# Patient Record
Sex: FEMALE | Race: WHITE | NOT HISPANIC OR LATINO | Employment: UNEMPLOYED | URBAN - METROPOLITAN AREA
[De-identification: names, ages, dates, MRNs, and addresses within clinical notes are randomized per-mention and may not be internally consistent; named-entity substitution may affect disease eponyms.]

---

## 2023-09-25 ENCOUNTER — HOSPITAL ENCOUNTER (INPATIENT)
Facility: HOSPITAL | Age: 68
LOS: 16 days | Discharge: NURSING, REHAB OR LONG-TERM CARE | DRG: 058 | End: 2023-10-11
Attending: PSYCHIATRY & NEUROLOGY | Admitting: PSYCHIATRY & NEUROLOGY
Payer: MEDICARE

## 2023-09-25 DIAGNOSIS — G82.20: ICD-10-CM

## 2023-09-25 DIAGNOSIS — G95.20 CORD COMPRESSION: ICD-10-CM

## 2023-09-25 DIAGNOSIS — G95.19 EDEMA OF SPINAL CORD: ICD-10-CM

## 2023-09-25 DIAGNOSIS — G37.3 ACUTE TRANSVERSE MYELITIS: Primary | ICD-10-CM

## 2023-09-25 PROBLEM — K52.9 COLITIS: Status: ACTIVE | Noted: 2023-09-25

## 2023-09-25 PROBLEM — F32.A DEPRESSION: Status: ACTIVE | Noted: 2023-09-25

## 2023-09-25 PROBLEM — K21.9 GERD (GASTROESOPHAGEAL REFLUX DISEASE): Status: ACTIVE | Noted: 2023-09-25

## 2023-09-25 PROBLEM — I21.4 NSTEMI (NON-ST ELEVATED MYOCARDIAL INFARCTION): Status: ACTIVE | Noted: 2023-09-25

## 2023-09-25 PROBLEM — S24.0XXA: Status: ACTIVE | Noted: 2023-09-25

## 2023-09-25 PROBLEM — K64.9 HEMORRHOIDS: Status: ACTIVE | Noted: 2023-09-25

## 2023-09-25 LAB
ABO + RH BLD: NORMAL
ALBUMIN SERPL BCP-MCNC: 2.3 G/DL (ref 3.5–5.2)
ALP SERPL-CCNC: 212 U/L (ref 55–135)
ALT SERPL W/O P-5'-P-CCNC: 94 U/L (ref 10–44)
ANION GAP SERPL CALC-SCNC: 6 MMOL/L (ref 8–16)
APTT PPP: 22.7 SEC (ref 21–32)
ASCENDING AORTA: 2.6 CM
AST SERPL-CCNC: 123 U/L (ref 10–40)
AV INDEX (PROSTH): 0.92
AV MEAN GRADIENT: 4 MMHG
AV PEAK GRADIENT: 9 MMHG
AV VALVE AREA BY VELOCITY RATIO: 1.92 CM²
AV VALVE AREA: 2.31 CM²
AV VELOCITY RATIO: 0.76
BACTERIA #/AREA URNS AUTO: ABNORMAL /HPF
BILIRUB SERPL-MCNC: 0.6 MG/DL (ref 0.1–1)
BILIRUB UR QL STRIP: NEGATIVE
BLD GP AB SCN CELLS X3 SERPL QL: NORMAL
BNP SERPL-MCNC: 12 PG/ML (ref 0–99)
BSA FOR ECHO PROCEDURE: 2.03 M2
BUN SERPL-MCNC: 18 MG/DL (ref 8–23)
CALCIUM SERPL-MCNC: 8.5 MG/DL (ref 8.7–10.5)
CHLORIDE SERPL-SCNC: 101 MMOL/L (ref 95–110)
CHOLEST SERPL-MCNC: 79 MG/DL (ref 120–199)
CHOLEST/HDLC SERPL: 2.2 {RATIO} (ref 2–5)
CLARITY UR REFRACT.AUTO: CLEAR
CO2 SERPL-SCNC: 25 MMOL/L (ref 23–29)
COLOR UR AUTO: YELLOW
CREAT SERPL-MCNC: 0.6 MG/DL (ref 0.5–1.4)
CV ECHO LV RWT: 0.38 CM
DOP CALC AO PEAK VEL: 1.47 M/S
DOP CALC AO VTI: 24.42 CM
DOP CALC LVOT AREA: 2.5 CM2
DOP CALC LVOT DIAMETER: 1.79 CM
DOP CALC LVOT PEAK VEL: 1.12 M/S
DOP CALC LVOT STROKE VOLUME: 56.49 CM3
DOP CALCLVOT PEAK VEL VTI: 22.46 CM
E WAVE DECELERATION TIME: 377.62 MSEC
E/A RATIO: 0.74
E/E' RATIO: 9.5 M/S
ECHO LV POSTERIOR WALL: 0.93 CM (ref 0.6–1.1)
EST. GFR  (NO RACE VARIABLE): >60 ML/MIN/1.73 M^2
ESTIMATED AVG GLUCOSE: 123 MG/DL (ref 68–131)
FRACTIONAL SHORTENING: 38 % (ref 28–44)
GLUCOSE SERPL-MCNC: 106 MG/DL (ref 70–110)
GLUCOSE UR QL STRIP: NEGATIVE
HBA1C MFR BLD: 5.9 % (ref 4–5.6)
HDLC SERPL-MCNC: 36 MG/DL (ref 40–75)
HDLC SERPL: 45.6 % (ref 20–50)
HGB UR QL STRIP: ABNORMAL
INR PPP: 1.1 (ref 0.8–1.2)
INTERVENTRICULAR SEPTUM: 0.9 CM (ref 0.6–1.1)
KETONES UR QL STRIP: ABNORMAL
LA MAJOR: 5.05 CM
LA MINOR: 5.37 CM
LA WIDTH: 3.51 CM
LACTATE SERPL-SCNC: 0.8 MMOL/L (ref 0.5–2.2)
LDLC SERPL CALC-MCNC: 31.6 MG/DL (ref 63–159)
LEFT INTERNAL DIMENSION IN SYSTOLE: 3.04 CM (ref 2.1–4)
LEFT VENTRICLE DIASTOLIC VOLUME INDEX: 56.94 ML/M2
LEFT VENTRICLE DIASTOLIC VOLUME: 111.61 ML
LEFT VENTRICLE MASS INDEX: 79 G/M2
LEFT VENTRICLE SYSTOLIC VOLUME INDEX: 18.4 ML/M2
LEFT VENTRICLE SYSTOLIC VOLUME: 36.03 ML
LEFT VENTRICULAR INTERNAL DIMENSION IN DIASTOLE: 4.88 CM (ref 3.5–6)
LEFT VENTRICULAR MASS: 155.27 G
LEUKOCYTE ESTERASE UR QL STRIP: ABNORMAL
LV LATERAL E/E' RATIO: 9.5 M/S
LV SEPTAL E/E' RATIO: 9.5 M/S
MAGNESIUM SERPL-MCNC: 2.2 MG/DL (ref 1.6–2.6)
MICROSCOPIC COMMENT: ABNORMAL
MV PEAK A VEL: 0.77 M/S
MV PEAK E VEL: 0.57 M/S
NITRITE UR QL STRIP: NEGATIVE
NON-SQ EPI CELLS #/AREA URNS AUTO: 1 /HPF
NONHDLC SERPL-MCNC: 43 MG/DL
PH UR STRIP: 6 [PH] (ref 5–8)
PHOSPHATE SERPL-MCNC: 3 MG/DL (ref 2.7–4.5)
POCT GLUCOSE: 120 MG/DL (ref 70–110)
POTASSIUM SERPL-SCNC: 4.8 MMOL/L (ref 3.5–5.1)
PROCALCITONIN SERPL IA-MCNC: 0.15 NG/ML
PROT SERPL-MCNC: 5.8 G/DL (ref 6–8.4)
PROT UR QL STRIP: ABNORMAL
PROTHROMBIN TIME: 11.4 SEC (ref 9–12.5)
RA MAJOR: 4.76 CM
RA PRESSURE ESTIMATED: 3 MMHG
RA WIDTH: 3.6 CM
RBC #/AREA URNS AUTO: 1 /HPF (ref 0–4)
RIGHT VENTRICULAR END-DIASTOLIC DIMENSION: 3.55 CM
RPR SER QL: NORMAL
SINUS: 2.99 CM
SODIUM SERPL-SCNC: 132 MMOL/L (ref 136–145)
SP GR UR STRIP: >1.03 (ref 1–1.03)
SPECIMEN OUTDATE: NORMAL
SQUAMOUS #/AREA URNS AUTO: 19 /HPF
STJ: 2.2 CM
T4 FREE SERPL-MCNC: 0.92 NG/DL (ref 0.71–1.51)
TDI LATERAL: 0.06 M/S
TDI SEPTAL: 0.06 M/S
TDI: 0.06 M/S
TRICUSPID ANNULAR PLANE SYSTOLIC EXCURSION: 1.82 CM
TRIGL SERPL-MCNC: 57 MG/DL (ref 30–150)
TROPONIN I SERPL DL<=0.01 NG/ML-MCNC: 0.01 NG/ML (ref 0–0.03)
TSH SERPL DL<=0.005 MIU/L-ACNC: 4.37 UIU/ML (ref 0.4–4)
URN SPEC COLLECT METH UR: ABNORMAL
WBC #/AREA URNS AUTO: 23 /HPF (ref 0–5)
Z-SCORE OF LEFT VENTRICULAR DIMENSION IN END DIASTOLE: -1.39
Z-SCORE OF LEFT VENTRICULAR DIMENSION IN END SYSTOLE: -1

## 2023-09-25 PROCEDURE — 93010 ELECTROCARDIOGRAM REPORT: CPT | Mod: ,,, | Performed by: INTERNAL MEDICINE

## 2023-09-25 PROCEDURE — 83605 ASSAY OF LACTIC ACID: CPT

## 2023-09-25 PROCEDURE — 84443 ASSAY THYROID STIM HORMONE: CPT

## 2023-09-25 PROCEDURE — 99291 PR CRITICAL CARE, E/M 30-74 MINUTES: ICD-10-PCS | Mod: FS,,, | Performed by: INTERNAL MEDICINE

## 2023-09-25 PROCEDURE — 86780 TREPONEMA PALLIDUM: CPT

## 2023-09-25 PROCEDURE — 86901 BLOOD TYPING SEROLOGIC RH(D): CPT

## 2023-09-25 PROCEDURE — 85730 THROMBOPLASTIN TIME PARTIAL: CPT

## 2023-09-25 PROCEDURE — 51701 INSERT BLADDER CATHETER: CPT

## 2023-09-25 PROCEDURE — 87040 BLOOD CULTURE FOR BACTERIA: CPT | Mod: 59

## 2023-09-25 PROCEDURE — 83735 ASSAY OF MAGNESIUM: CPT

## 2023-09-25 PROCEDURE — 99291 CRITICAL CARE FIRST HOUR: CPT | Mod: FS,,, | Performed by: INTERNAL MEDICINE

## 2023-09-25 PROCEDURE — 80053 COMPREHEN METABOLIC PANEL: CPT

## 2023-09-25 PROCEDURE — 87086 URINE CULTURE/COLONY COUNT: CPT

## 2023-09-25 PROCEDURE — 84145 PROCALCITONIN (PCT): CPT

## 2023-09-25 PROCEDURE — 84484 ASSAY OF TROPONIN QUANT: CPT

## 2023-09-25 PROCEDURE — 93005 ELECTROCARDIOGRAM TRACING: CPT

## 2023-09-25 PROCEDURE — 20000000 HC ICU ROOM

## 2023-09-25 PROCEDURE — 80061 LIPID PANEL: CPT

## 2023-09-25 PROCEDURE — 99223 PR INITIAL HOSPITAL CARE,LEVL III: ICD-10-PCS | Mod: ,,, | Performed by: PHYSICIAN ASSISTANT

## 2023-09-25 PROCEDURE — 94761 N-INVAS EAR/PLS OXIMETRY MLT: CPT

## 2023-09-25 PROCEDURE — 83880 ASSAY OF NATRIURETIC PEPTIDE: CPT

## 2023-09-25 PROCEDURE — 99900035 HC TECH TIME PER 15 MIN (STAT)

## 2023-09-25 PROCEDURE — 84100 ASSAY OF PHOSPHORUS: CPT

## 2023-09-25 PROCEDURE — 25000003 PHARM REV CODE 250

## 2023-09-25 PROCEDURE — 86592 SYPHILIS TEST NON-TREP QUAL: CPT

## 2023-09-25 PROCEDURE — 63600175 PHARM REV CODE 636 W HCPCS

## 2023-09-25 PROCEDURE — 36415 COLL VENOUS BLD VENIPUNCTURE: CPT | Performed by: PSYCHIATRY & NEUROLOGY

## 2023-09-25 PROCEDURE — 81001 URINALYSIS AUTO W/SCOPE: CPT

## 2023-09-25 PROCEDURE — 99223 1ST HOSP IP/OBS HIGH 75: CPT | Mod: ,,, | Performed by: PHYSICIAN ASSISTANT

## 2023-09-25 PROCEDURE — 93010 EKG 12-LEAD: ICD-10-PCS | Mod: ,,, | Performed by: INTERNAL MEDICINE

## 2023-09-25 PROCEDURE — 83036 HEMOGLOBIN GLYCOSYLATED A1C: CPT

## 2023-09-25 PROCEDURE — 85610 PROTHROMBIN TIME: CPT

## 2023-09-25 PROCEDURE — 85025 COMPLETE CBC W/AUTO DIFF WBC: CPT

## 2023-09-25 PROCEDURE — 84439 ASSAY OF FREE THYROXINE: CPT

## 2023-09-25 RX ORDER — MIDAZOLAM HYDROCHLORIDE 1 MG/ML
0.5 INJECTION INTRAMUSCULAR; INTRAVENOUS EVERY 5 MIN PRN
Status: DISCONTINUED | OUTPATIENT
Start: 2023-09-26 | End: 2023-09-27

## 2023-09-25 RX ORDER — ONDANSETRON 2 MG/ML
4 INJECTION INTRAMUSCULAR; INTRAVENOUS EVERY 8 HOURS PRN
Status: DISCONTINUED | OUTPATIENT
Start: 2023-09-25 | End: 2023-10-11 | Stop reason: HOSPADM

## 2023-09-25 RX ORDER — BISACODYL 10 MG
10 SUPPOSITORY, RECTAL RECTAL DAILY
Status: DISCONTINUED | OUTPATIENT
Start: 2023-09-25 | End: 2023-09-26

## 2023-09-25 RX ORDER — AMOXICILLIN 250 MG
1 CAPSULE ORAL 2 TIMES DAILY
Status: DISCONTINUED | OUTPATIENT
Start: 2023-09-25 | End: 2023-09-27

## 2023-09-25 RX ORDER — HYDRALAZINE HYDROCHLORIDE 20 MG/ML
10 INJECTION INTRAMUSCULAR; INTRAVENOUS EVERY 6 HOURS PRN
Status: DISCONTINUED | OUTPATIENT
Start: 2023-09-25 | End: 2023-09-27

## 2023-09-25 RX ORDER — ATORVASTATIN CALCIUM 40 MG/1
40 TABLET, FILM COATED ORAL DAILY
Status: DISCONTINUED | OUTPATIENT
Start: 2023-09-25 | End: 2023-09-30

## 2023-09-25 RX ORDER — METRONIDAZOLE 500 MG/100ML
500 INJECTION, SOLUTION INTRAVENOUS
Status: DISCONTINUED | OUTPATIENT
Start: 2023-09-25 | End: 2023-09-27

## 2023-09-25 RX ORDER — CIPROFLOXACIN 250 MG/5ML
500 KIT ORAL EVERY 12 HOURS
Status: DISCONTINUED | OUTPATIENT
Start: 2023-09-25 | End: 2023-09-27

## 2023-09-25 RX ORDER — FAMOTIDINE 20 MG/1
20 TABLET, FILM COATED ORAL 2 TIMES DAILY
Status: DISCONTINUED | OUTPATIENT
Start: 2023-09-25 | End: 2023-10-01

## 2023-09-25 RX ORDER — OXYCODONE HYDROCHLORIDE 5 MG/1
5 TABLET ORAL EVERY 6 HOURS PRN
Status: DISCONTINUED | OUTPATIENT
Start: 2023-09-25 | End: 2023-09-26

## 2023-09-25 RX ORDER — SODIUM CHLORIDE 0.9 % (FLUSH) 0.9 %
10 SYRINGE (ML) INJECTION
Status: DISCONTINUED | OUTPATIENT
Start: 2023-09-25 | End: 2023-09-25

## 2023-09-25 RX ORDER — POLYETHYLENE GLYCOL 3350 17 G/17G
17 POWDER, FOR SOLUTION ORAL DAILY
Status: DISCONTINUED | OUTPATIENT
Start: 2023-09-25 | End: 2023-09-27

## 2023-09-25 RX ADMIN — OXYCODONE HYDROCHLORIDE 5 MG: 5 TABLET ORAL at 08:09

## 2023-09-25 RX ADMIN — FAMOTIDINE 20 MG: 20 TABLET ORAL at 08:09

## 2023-09-25 RX ADMIN — SENNOSIDES AND DOCUSATE SODIUM 1 TABLET: 50; 8.6 TABLET ORAL at 11:09

## 2023-09-25 RX ADMIN — SENNOSIDES AND DOCUSATE SODIUM 1 TABLET: 50; 8.6 TABLET ORAL at 08:09

## 2023-09-25 RX ADMIN — CIPROFLOXACIN 500 MG: KIT at 10:09

## 2023-09-25 RX ADMIN — OXYCODONE HYDROCHLORIDE 5 MG: 5 TABLET ORAL at 11:09

## 2023-09-25 RX ADMIN — METRONIDAZOLE 500 MG: 500 INJECTION, SOLUTION INTRAVENOUS at 08:09

## 2023-09-25 RX ADMIN — ATORVASTATIN CALCIUM 40 MG: 40 TABLET, FILM COATED ORAL at 11:09

## 2023-09-25 RX ADMIN — POLYETHYLENE GLYCOL 3350 17 G: 17 POWDER, FOR SOLUTION ORAL at 11:09

## 2023-09-25 NOTE — PROGRESS NOTES
Patient arrived to [facility] from [facility] by Ambulance    Type of stroke/diagnosis:  Spinal Cord Compression      Current symptoms: Lower back pain and leg weakness      Skin assessment done: Y  Wounds noted: blanchable redness to bottom  Second nurse verification: Rodolfo Thibodeaux Completed? Yes    Patient Belongings on Admit: rolling walker, phone and , hair brush, glasses on    NCC notified: ISAAK Carlson

## 2023-09-25 NOTE — SUBJECTIVE & OBJECTIVE
No medications prior to admission.       Review of patient's allergies indicates:   Allergen Reactions    Acetaminophen Shortness Of Breath    Azithromycin Shortness Of Breath    Amlodipine      hypertension    Androgenic anabolic steroid      hypertension    Bisoprolol Other (See Comments)     hypertension    Buspirone Other (See Comments)     hypertension    Cortisone Dermatitis     Burning skin feeling     Erythromycin base Hives    Ibuprofen Other (See Comments)     Hypertension      Immune globulin,gamma caprylate(igg) Hives     All Gamma globulins    Latex, natural rubber Itching    Levaquin [levofloxacin] Other (See Comments)     Fever      Losartan Other (See Comments)     hypertension    Penicillins Hives    Wellbutrin [bupropion hcl] Other (See Comments)     depression    Adhesive Rash    Hydrochlorothiazide Rash    Macrobid [nitrofurantoin monohyd/m-cryst] Rash       Past Medical History:   Diagnosis Date    Abnormal heart rhythm     Acid reflux     Bile duct perforation     Diverticular disease of large intestine without perforation or abscess     Essential (primary) hypertension     Fatigue     Gastritis     GERD (gastroesophageal reflux disease)     Hepatic dysfunction      Past Surgical History:   Procedure Laterality Date    CHOLECYSTECTOMY       Family History    None       Tobacco Use    Smoking status: Not on file    Smokeless tobacco: Not on file   Substance and Sexual Activity    Alcohol use: Not on file    Drug use: Not on file    Sexual activity: Not on file     Review of Systems  Objective:     Weight: 91.6 kg (202 lb)  Body mass index is 34.67 kg/m².  Vital Signs (Most Recent):  Temp: 98 °F (36.7 °C) (09/25/23 1501)  Pulse: 63 (09/25/23 1601)  Resp: (!) 27 (09/25/23 1601)  BP: (!) 140/79 (09/25/23 1601)  SpO2: 96 % (09/25/23 1601) Vital Signs (24h Range):  Temp:  [97.8 °F (36.6 °C)-98.2 °F (36.8 °C)] 98 °F (36.7 °C)  Pulse:  [60-67] 63  Resp:  [18-35] 27  SpO2:  [95 %-97 %] 96 %  BP:  "(108-169)/(57-84) 140/79     Date 09/25/23 0700 - 09/26/23 0659   Shift 7827-3367 4963-9946 2908-6282 24 Hour Total   INTAKE   P.O.  250  250   Shift Total(mL/kg)  250(2.7)  250(2.7)   OUTPUT   Urine(mL/kg/hr)  120  120   Shift Total(mL/kg)  120(1.3)  120(1.3)   Weight (kg) 91.6 91.6 91.6 91.6              Oxygen Concentration (%):  [96] 96        Female External Urinary Catheter 09/25/23 1100 (Active)   Skin no breakdown;no redness 09/25/23 1501   Tolerance no signs/symptoms of discomfort 09/25/23 1501       Neurosurgery Physical Exam  General: well developed, well nourished, no distress.   Head: normocephalic, atraumatic  Neurologic: Alert and oriented. Thought content appropriate.  GCS: Motor: 6/Verbal: 5/Eyes: 4 GCS Total: 15  Mental Status: Awake, Alert, Oriented x 4  Language: No aphasia  Speech: No dysarthria  Cranial nerves: face symmetric, tongue midline, CN II-XII grossly intact.   Eyes: pupils equal, round, reactive to light with accommodation, EOMI.   Pulmonary: normal respirations, no signs of respiratory distress  Abdomen: distended and painful to palpation in all quadrants   Skin: Skin is warm, dry and intact.  Sensory: T12 sensory level  Motor Strength: 5/5 strength in BUE; 0/5 in BLE  Rectal tone is absent        Significant Labs:  No results for input(s): "GLU", "NA", "K", "CL", "CO2", "BUN", "CREATININE", "CALCIUM", "MG" in the last 48 hours.  No results for input(s): "WBC", "HGB", "HCT", "PLT" in the last 48 hours.  Recent Labs   Lab 09/25/23  1136   INR 1.1   APTT 22.7     Microbiology Results (last 7 days)       Procedure Component Value Units Date/Time    Urine culture [[id number]] Collected: 09/25/23 1519    Order Status: No result Specimen: Urine Updated: 09/25/23 1617    Blood culture [[id number]] Collected: 09/25/23 1450    Order Status: Sent Specimen: Blood from Peripheral, Hand, Left Updated: 09/25/23 1505    Blood culture [[id number]] Collected: 09/25/23 1449    Order Status: Sent " Specimen: Blood from Peripheral, Antecubital, Right Updated: 09/25/23 1505    Culture, Respiratory with Gram Stain [[id number]]     Order Status: No result Specimen: Respiratory           All pertinent labs from the last 24 hours have been reviewed.    Significant Diagnostics:  I have reviewed all pertinent imaging results/findings within the past 24 hours.

## 2023-09-25 NOTE — CONSULTS
Inpatient consult to Physical Medicine Rehab  Consult performed by: Francia Lam NP  Consult ordered by: Aldo Galarza PA-C  Reason for consult: Assess rehab needs      Reviewed patient history and current admission.  Rehab team following.  Full consult to follow.    SYLVESTER Angel, FNP-C  Physical Medicine & Rehabilitation   09/25/2023

## 2023-09-25 NOTE — ASSESSMENT & PLAN NOTE
66yo F PMHx GERD, HTN, smoker, depression presenting with bilateral lower extremity plegia that began after heavy lifting on 9/18/23. OSH radiology report for MRI saying cord edema from T9-L1, no disc sent with patient.    --Patient admitted to [facility]   -q1h neurochecks in ICU  --All labs and diagnostics reviewed   -MRI brain/C/T/L spine w/wo contrast pending  --MAP goals >85 at this time   --Bladder scans q6h with I&O cath   --Follow-up full pre-op labs (CBC/CMP/PT-INR/PTT/T&S)  --Infectious work up pending  --on ASA and plavix for recent NSTEMI - hold in acute setting   --NPO at this time for possible operative intervention  --Continue to monitor clinically, notify NSGY immediately with any changes in neuro status    Discussed with Dr. Unger

## 2023-09-25 NOTE — H&P
[facility]  Neurocritical Care  History & Physical    Admit Date: 9/25/2023  Service Date: 09/25/2023  Length of Stay: 0    Subjective:     Chief Complaint: Traumatic edema of thoracic spinal cord    History of Present Illness: Rhina Forrest is a 68yo F PMHx GERD, HTN, smoker, depression presenting with bilateral lower extremity paresis and paresthesia that began after heavy lifting on 9/18/23. Pt walks with a walker at baseline and lives at home alone. On 9/18 she was walking home with groceries on her walker and lifted the walker over a curb and hurt her back. Pt was able to walk home and put her groceries away. Once she sat on the couch, she was unable to get back up as her legs stopped moving. She also had decreased sensation from T12 down. Pt called 911 and was taken via EMS to [facility]. MRI cervical and thoracic spine revealed stenosis and T9-L1 cord edema with mild enhancement suspicious for evolving cord infarct vs demyelinating process. Pt was found to have NSTEMI with troponin 1.1 and was started on ASA and plavix. Pt also c/o bl upper quadrant abdominal pain and CT abdomen pelvis revealed colitis. She was started on cipro and flagyl. Pt has had a barber since 9/18 and has not had any bowel movements since 9/18 despite aggressive bowel regimen at OSH. Transferred to [facility] for neurosurgical evaluation and further workup. Pt currently c/o severe abdominal pain, n/v, severe back pain, BUE rash x 1 month, BLE paresis and paresthesia. Directly admitted to [facility] for higher level of care.       Past Medical History:   Diagnosis Date    Abnormal heart rhythm     Acid reflux     Bile duct perforation     Diverticular disease of large intestine without perforation or abscess     Essential (primary) hypertension     Fatigue     Gastritis     GERD (gastroesophageal reflux disease)     Hepatic dysfunction      Past Surgical History:   Procedure Laterality Date     CHOLECYSTECTOMY        No current facility-administered medications on file prior to encounter.     No current outpatient medications on file prior to encounter.      Allergies: Acetaminophen; Azithromycin; Amlodipine; Androgenic anabolic steroid; Bisoprolol; Buspirone; Cortisone; Erythromycin base; Ibuprofen; Immune globulin,gamma caprylate(igg); Latex, natural rubber; Levaquin [levofloxacin]; Losartan; Penicillins; Wellbutrin [bupropion hcl]; Adhesive; Hydrochlorothiazide; and Macrobid [nitrofurantoin monohyd/m-cryst]    No family history on file.     Review of Systems   Constitutional:  Positive for fatigue. Negative for chills, fever and unexpected weight change.   HENT:  Negative for dental problem and trouble swallowing.    Eyes:  Negative for visual disturbance.   Respiratory:  Negative for cough and shortness of breath.    Cardiovascular:  Negative for chest pain, palpitations and leg swelling.   Gastrointestinal:  Positive for abdominal distention, abdominal pain, anal bleeding, nausea and vomiting. Negative for diarrhea and rectal pain.   Endocrine: Negative for polyuria.   Genitourinary:  Positive for difficulty urinating.   Musculoskeletal:  Positive for back pain. Negative for joint swelling, myalgias, neck pain and neck stiffness.   Skin:  Positive for rash.        BUE rash    Neurological:  Positive for weakness and numbness. Negative for dizziness, seizures, syncope, speech difficulty, light-headedness and headaches.   Hematological:  Does not bruise/bleed easily.   Psychiatric/Behavioral:  Negative for confusion and decreased concentration.      Objective:     Vitals:    Temp: 97.8 °F (36.6 °C)  Pulse: 65  Rhythm: normal sinus rhythm  BP: 132/84  MAP (mmHg): 103  Resp: (!) 33  SpO2: 97 %    Temp  Min: 97.8 °F (36.6 °C)  Max: 98.2 °F (36.8 °C)  Pulse  Min: 60  Max: 67  BP  Min: 108/57  Max: 167/81  MAP (mmHg)  Min: 98  Max: 111  Resp  Min: 18  Max: 35  SpO2  Min: 96 %  Max: 97 %  Oxygen Concentration  (%)  Min: 96  Max: 96    No intake/output data recorded.            Physical Exam  Vitals and nursing note reviewed.   Constitutional:       Appearance: Normal appearance. She is obese. She is ill-appearing.   HENT:      Head: Normocephalic and atraumatic.      Right Ear: External ear normal.      Left Ear: External ear normal.      Nose: Nose normal.      Mouth/Throat:      Mouth: Mucous membranes are dry.      Pharynx: Oropharynx is clear. No posterior oropharyngeal erythema.      Comments: Poor dentition, missing several teeth   Eyes:      Extraocular Movements: Extraocular movements intact.      Conjunctiva/sclera: Conjunctivae normal.      Pupils: Pupils are equal, round, and reactive to light.   Cardiovascular:      Rate and Rhythm: Regular rhythm. Bradycardia present.      Heart sounds: Normal heart sounds.   Pulmonary:      Effort: Pulmonary effort is normal.      Comments: Bl coarse BS  Abdominal:      General: There is distension.      Tenderness: There is abdominal tenderness. There is guarding.      Comments: Severe tenderness and guarding to light palpation   Musculoskeletal:      Cervical back: Normal range of motion and neck supple. No rigidity or tenderness.      Right lower leg: No edema.      Left lower leg: No edema.   Lymphadenopathy:      Cervical: No cervical adenopathy.   Neurological:      Mental Status: She is alert.      Comments: E4 V5 M6  Alert. Oriented x4. Speech fluent- no dysarthria or aphasia. Following commands.   CN II-XII grossly intact, specifically:  PERRLA. EOMI.   No facial asymmetry. Facial sensation intact in V1-V3.  Tongue midline. Shoulder shrug symmetric.  + Cough  Motor:  RUE 5/5  RLE 0/5  LUE 5/5  LLE 0/5  No rectal tone  Sensation reduced from T11-12 down. Feels some tingling and pressure throughout BLE. + saddle anesthesia   Psychiatric:         Mood and Affect: Mood normal.         Behavior: Behavior normal.       Unable to test gait due to level of consciousness.        Today I personally reviewed pertinent medications, lines/drains/airways, imaging, cardiology results, laboratory results, microbiology results, notably:    CT and MRI reports, no OSH imaging available       Assessment/Plan:     Neuro  * Spinal cord edema spanning T9-L1  68yo F PMHx GERD, HTN, smoker, depression presenting with bilateral lower extremity paresis and paresthesia that began after heavy lifting on 23. OSH MRI cervical and thoracic spine revealed stenosis and T9-L1 cord edema with mild enhancement suspicious for evolving cord infarct vs demyelinating process. Pt has had a barber since  and has not had any bowel movements since  despite aggressive bowel regimen at OSH.       Admit to NCC   Q1h neuro checks, I&O, and vitals    CBC, CMP, mag, phos daily    A1c, TSH, lipid panel, coags   Echo, EKG, CXR    NSGY consulted   MAP >85   MRI c/s contrast of brain, cervical, thoracic, and lumbar spine    Dex pending MRI results    Pan cx    RPR    Bladder scans q6h with I&O cath    PRN pain regimen    NPO pending NSGY plan   SCDs; hold chemical VTE ppx pending nsgy plan    PT/OT/SLP    Psychiatric  Depression  Hx of depression. 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 Pt's son committed suicide and her mother  after her son passed. Pt now lives alone and has minimal interaction with her 2 living children. Pt found to have several psychosomatic complaints at OSH related to medication. She stated she takes her home medications 1-2x per month as she is scared of the side effects.   · Consider psych evaluation after acute period     Cardiac/Vascular  NSTEMI (non-ST elevated myocardial infarction)  NSTEMI with troponin .6 then 1.1 and was started on ASA and plavix at OSH  · Troponin  · EKG  · Echo   · BNP   · Hold asa and plavix pending MRI    GI  Colitis  Bl upper quadrant abdominal pain and CT abdomen pelvis revealed colitis. She was started on cipro and  flagyl. Has not had any bowel movements since 9/18 despite aggressive bowel regimen at OSH.     · Cipro and flagyl   · Severe distention on exam with pain to light palpation worse in bl upper quadrants than lower  · KUB    GERD (gastroesophageal reflux disease)  · Famotidine     Internal Hemorrhoids  Sung blood noted after rectal exam  · CTM        The patient is being Prophylaxed for:  Venous Thromboembolism with: Mechanical  Stress Ulcer with: H2B  Ventilator Pneumonia with: not applicable    Activity Orders          Turn patient starting at 09/25 1200    Elevate HOB starting at 09/25 1108    Diet NPO Except for: Medication: NPO starting at 09/25 1108        Full Code     Critical care time spent on the evaluation and treatment of severe organ dysfunction, review of pertinent labs and imaging studies, discussions with consulting providers and discussions with patient/family: 45 minutes.    Aldo Galarza PA-C  Neurocritical Care  [facility]

## 2023-09-25 NOTE — PROGRESS NOTES
When performing SC noted breanna red blood near perineum. Prior to performing notified Aldo MULLIGAN. Per Aldo pt had hemorrhoids noted after NSGY performed  rectal exam. Ok to continue with SC and continue to monitor

## 2023-09-25 NOTE — CONSULTS
[facility]  Neurosurgery  Consult Note    Inpatient consult to Neurosurgery  Consult performed by: Nhi Light PA-C  Consult ordered by: Aldo Galarza PA-C        Subjective:     Chief Complaint/Reason for Admission: paraplegia     History of Present Illness: Rhina Forrest is a 68yo F PMHx GERD, HTN, smoker, depression presenting with bilateral lower extremity plegia that began after heavy lifting on 9/18/23. Pt walks with a walker at baseline and lives at home alone. On 9/18 she was walking home with groceries on her walker and lifted the walker over a curb and hurt her back. Pt was able to walk home and put her groceries away. Once she sat on the couch, she was unable to get back up as her legs stopped moving. She also had decreased sensation from T12 down. Pt called 911 and was taken via EMS to [facility]. MRI cervical and thoracic spine revealed stenosis and T9-L1 cord edema with mild enhancement suspicious for evolving cord infarct vs demyelinating process per radiology report (no imaging was sent with patient). Pt was found to have NSTEMI with troponin 1.1 and was started on ASA and plavix. Pt also c/o bl upper quadrant abdominal pain and CT abdomen pelvis revealed colitis. She was started on cipro and flagyl. Pt has had a barber since 9/18 and has not had any bowel movements since 9/18 despite aggressive bowel regimen at OSH. Transferred to [facility] for neurosurgical evaluation and further workup. Pt currently c/o severe abdominal pain, n/v, severe back pain, BUE rash x 1 month, BLE plegia and T12 sensory level.       No medications prior to admission.       Review of patient's allergies indicates:   Allergen Reactions    Acetaminophen Shortness Of Breath    Azithromycin Shortness Of Breath    Amlodipine      hypertension    Androgenic anabolic steroid      hypertension    Bisoprolol Other (See Comments)     hypertension    Buspirone Other (See Comments)      hypertension    Cortisone Dermatitis     Burning skin feeling     Erythromycin base Hives    Ibuprofen Other (See Comments)     Hypertension      Immune globulin,gamma caprylate(igg) Hives     All Gamma globulins    Latex, natural rubber Itching    Levaquin [levofloxacin] Other (See Comments)     Fever      Losartan Other (See Comments)     hypertension    Penicillins Hives    Wellbutrin [bupropion hcl] Other (See Comments)     depression    Adhesive Rash    Hydrochlorothiazide Rash    Macrobid [nitrofurantoin monohyd/m-cryst] Rash       Past Medical History:   Diagnosis Date    Abnormal heart rhythm     Acid reflux     Bile duct perforation     Diverticular disease of large intestine without perforation or abscess     Essential (primary) hypertension     Fatigue     Gastritis     GERD (gastroesophageal reflux disease)     Hepatic dysfunction      Past Surgical History:   Procedure Laterality Date    CHOLECYSTECTOMY       Family History    None       Tobacco Use    Smoking status: Not on file    Smokeless tobacco: Not on file   Substance and Sexual Activity    Alcohol use: Not on file    Drug use: Not on file    Sexual activity: Not on file     Review of Systems  Objective:     Weight: 91.6 kg (202 lb)  Body mass index is 34.67 kg/m².  Vital Signs (Most Recent):  Temp: 98 °F (36.7 °C) (09/25/23 1501)  Pulse: 63 (09/25/23 1601)  Resp: (!) 27 (09/25/23 1601)  BP: (!) 140/79 (09/25/23 1601)  SpO2: 96 % (09/25/23 1601) Vital Signs (24h Range):  Temp:  [97.8 °F (36.6 °C)-98.2 °F (36.8 °C)] 98 °F (36.7 °C)  Pulse:  [60-67] 63  Resp:  [18-35] 27  SpO2:  [95 %-97 %] 96 %  BP: (108-169)/(57-84) 140/79     Date 09/25/23 0700 - 09/26/23 0659   Shift 0715-3564 9438-6715 9977-9087 24 Hour Total   INTAKE   P.O.  250  250   Shift Total(mL/kg)  250(2.7)  250(2.7)   OUTPUT   Urine(mL/kg/hr)  120  120   Shift Total(mL/kg)  120(1.3)  120(1.3)   Weight (kg) 91.6 91.6 91.6 91.6              Oxygen  "Concentration (%):  [96] 96        Female External Urinary Catheter 09/25/23 1100 (Active)   Skin no breakdown;no redness 09/25/23 1501   Tolerance no signs/symptoms of discomfort 09/25/23 1501       Neurosurgery Physical Exam  General: well developed, well nourished, no distress.   Head: normocephalic, atraumatic  Neurologic: Alert and oriented. Thought content appropriate.  GCS: Motor: 6/Verbal: 5/Eyes: 4 GCS Total: 15  Mental Status: Awake, Alert, Oriented x 4  Language: No aphasia  Speech: No dysarthria  Cranial nerves: face symmetric, tongue midline, CN II-XII grossly intact.   Eyes: pupils equal, round, reactive to light with accommodation, EOMI.   Pulmonary: normal respirations, no signs of respiratory distress  Abdomen: distended and painful to palpation in all quadrants   Skin: Skin is warm, dry and intact.  Sensory: T12 sensory level  Motor Strength: 5/5 strength in BUE; 0/5 in BLE  Rectal tone is absent        Significant Labs:  No results for input(s): "GLU", "NA", "K", "CL", "CO2", "BUN", "CREATININE", "CALCIUM", "MG" in the last 48 hours.  No results for input(s): "WBC", "HGB", "HCT", "PLT" in the last 48 hours.  Recent Labs   Lab 09/25/23  1136   INR 1.1   APTT 22.7     Microbiology Results (last 7 days)       Procedure Component Value Units Date/Time    Urine culture [[id number]] Collected: 09/25/23 1519    Order Status: No result Specimen: Urine Updated: 09/25/23 1617    Blood culture [[id number]] Collected: 09/25/23 1450    Order Status: Sent Specimen: Blood from Peripheral, Hand, Left Updated: 09/25/23 1505    Blood culture [[id number]] Collected: 09/25/23 1449    Order Status: Sent Specimen: Blood from Peripheral, Antecubital, Right Updated: 09/25/23 1505    Culture, Respiratory with Gram Stain [[id number]]     Order Status: No result Specimen: Respiratory           All pertinent labs from the last 24 hours have been reviewed.    Significant Diagnostics:  I have reviewed all pertinent imaging " results/findings within the past 24 hours.    Assessment/Plan:     * Spinal cord edema spanning T9-L1  68yo F PMHx GERD, HTN, smoker, depression presenting with bilateral lower extremity plegia that began after heavy lifting on 9/18/23. OSH radiology report for MRI saying cord edema from T9-L1, no disc sent with patient.    --Patient admitted to [facility]   -q1h neurochecks in ICU  --All labs and diagnostics reviewed   -MRI brain/C/T/L spine w/wo contrast pending  --MAP goals >85 at this time   --Bladder scans q6h with I&O cath   --Follow-up full pre-op labs (CBC/CMP/PT-INR/PTT/T&S)  --Infectious work up pending  --on ASA and plavix for recent NSTEMI - hold in acute setting   --NPO at this time for possible operative intervention  --Continue to monitor clinically, notify NSGY immediately with any changes in neuro status    Discussed with Dr. Unger        Thank you for your consult. I will follow-up with patient. Please contact us if you have any additional questions.    Nhi Light PA-C  Neurosurgery  [facility]

## 2023-09-25 NOTE — ASSESSMENT & PLAN NOTE
Hx of depression. 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 Pt's son committed suicide and her mother  after her son passed. Pt now lives alone and has minimal interaction with her 2 living children. Pt found to have several psychosomatic complaints at OSH related to medication. She stated she takes her home medications 1-2x per month as she is scared of the side effects.   · Consider psych evaluation after acute period

## 2023-09-25 NOTE — SUBJECTIVE & OBJECTIVE
Past Medical History:   Diagnosis Date    Abnormal heart rhythm     Acid reflux     Bile duct perforation     Diverticular disease of large intestine without perforation or abscess     Essential (primary) hypertension     Fatigue     Gastritis     GERD (gastroesophageal reflux disease)     Hepatic dysfunction      Past Surgical History:   Procedure Laterality Date    CHOLECYSTECTOMY        No current facility-administered medications on file prior to encounter.     No current outpatient medications on file prior to encounter.      Allergies: Acetaminophen; Azithromycin; Amlodipine; Androgenic anabolic steroid; Bisoprolol; Buspirone; Cortisone; Erythromycin base; Ibuprofen; Immune globulin,gamma caprylate(igg); Latex, natural rubber; Levaquin [levofloxacin]; Losartan; Penicillins; Wellbutrin [bupropion hcl]; Adhesive; Hydrochlorothiazide; and Macrobid [nitrofurantoin monohyd/m-cryst]    No family history on file.     Review of Systems   Constitutional:  Positive for fatigue. Negative for chills, fever and unexpected weight change.   HENT:  Negative for dental problem and trouble swallowing.    Eyes:  Negative for visual disturbance.   Respiratory:  Negative for cough and shortness of breath.    Cardiovascular:  Negative for chest pain, palpitations and leg swelling.   Gastrointestinal:  Positive for abdominal distention, abdominal pain, anal bleeding, nausea and vomiting. Negative for diarrhea and rectal pain.   Endocrine: Negative for polyuria.   Genitourinary:  Positive for difficulty urinating.   Musculoskeletal:  Positive for back pain. Negative for joint swelling, myalgias, neck pain and neck stiffness.   Skin:  Positive for rash.        BUE rash    Neurological:  Positive for weakness and numbness. Negative for dizziness, seizures, syncope, speech difficulty, light-headedness and headaches.   Hematological:  Does not bruise/bleed easily.   Psychiatric/Behavioral:  Negative for confusion and decreased  concentration.      Objective:     Vitals:    Temp: 97.8 °F (36.6 °C)  Pulse: 65  Rhythm: normal sinus rhythm  BP: 132/84  MAP (mmHg): 103  Resp: (!) 33  SpO2: 97 %    Temp  Min: 97.8 °F (36.6 °C)  Max: 98.2 °F (36.8 °C)  Pulse  Min: 60  Max: 67  BP  Min: 108/57  Max: 167/81  MAP (mmHg)  Min: 98  Max: 111  Resp  Min: 18  Max: 35  SpO2  Min: 96 %  Max: 97 %  Oxygen Concentration (%)  Min: 96  Max: 96    No intake/output data recorded.            Physical Exam  Vitals and nursing note reviewed.   Constitutional:       Appearance: Normal appearance. She is obese. She is ill-appearing.   HENT:      Head: Normocephalic and atraumatic.      Right Ear: External ear normal.      Left Ear: External ear normal.      Nose: Nose normal.      Mouth/Throat:      Mouth: Mucous membranes are dry.      Pharynx: Oropharynx is clear. No posterior oropharyngeal erythema.      Comments: Poor dentition, missing several teeth   Eyes:      Extraocular Movements: Extraocular movements intact.      Conjunctiva/sclera: Conjunctivae normal.      Pupils: Pupils are equal, round, and reactive to light.   Cardiovascular:      Rate and Rhythm: Regular rhythm. Bradycardia present.      Heart sounds: Normal heart sounds.   Pulmonary:      Effort: Pulmonary effort is normal.      Comments: Bl coarse BS  Abdominal:      General: There is distension.      Tenderness: There is abdominal tenderness. There is guarding.      Comments: Severe tenderness and guarding to light palpation   Musculoskeletal:      Cervical back: Normal range of motion and neck supple. No rigidity or tenderness.      Right lower leg: No edema.      Left lower leg: No edema.   Lymphadenopathy:      Cervical: No cervical adenopathy.   Neurological:      Mental Status: She is alert.      Comments: E4 V5 M6  Alert. Oriented x4. Speech fluent- no dysarthria or aphasia. Following commands.   CN II-XII grossly intact, specifically:  PERRLA. EOMI.   No facial asymmetry. Facial sensation  intact in V1-V3.  Tongue midline. Shoulder shrug symmetric.  + Cough  Motor:  RUE 5/5  RLE 0/5  LUE 5/5  LLE 0/5  No rectal tone  Sensation reduced from T11-12 down. Feels some tingling and pressure throughout BLE. + saddle anesthesia   Psychiatric:         Mood and Affect: Mood normal.         Behavior: Behavior normal.       Unable to test gait due to level of consciousness.       Today I personally reviewed pertinent medications, lines/drains/airways, imaging, cardiology results, laboratory results, microbiology results, notably:    CT and MRI reports, no OSH imaging available

## 2023-09-25 NOTE — PLAN OF CARE
"[facility] Care Plan    POC reviewed with Rhina Forrest and family at 1400. Pt verbalized understanding. Questions and concerns addressed. No acute events today. Pt progressing toward goals. Will continue to monitor. See below and flowsheets for full assessment and VS info.     Labs Sent  Blood cultures collected  UA sent  Abd xray complete  Echo complete  EKG complete   Awaiting MRI- Team will put in versed prior to imaging (1 1/2 hours)  Sputum culture to be collected when pt able to produce sputum       Is this a stroke patient? no    Neuro:  [place] Coma Scale  Best Eye Response: 4-->(E4) spontaneous  Best Motor Response: 6-->(M6) obeys commands  Best Verbal Response: 5-->(V5) oriented  [place] Coma Scale Score: 15  Pupil PERRLA: yes     24 hr Temp:  [97.8 °F (36.6 °C)-98.2 °F (36.8 °C)]     CV:   Rhythm: normal sinus rhythm  BP goals:   SBP <     MAP > 85    Resp:           Plan: N/A    GI/:     Diet/Nutrition Received: NPO  Last Bowel Movement: 09/25/23  Voiding Characteristics: external catheter    Intake/Output Summary (Last 24 hours) at 9/25/2023 1638  Last data filed at 9/25/2023 1601  Gross per 24 hour   Intake 250 ml   Output 120 ml   Net 130 ml          Labs/Accuchecks:  No results for input(s): "WBC", "RBC", "HGB", "HCT", "PLT" in the last 168 hours. No results for input(s): "NA", "K", "CO2", "CL", "BUN", "CREATININE", "ALKPHOS", "ALT", "AST", "BILITOT" in the last 168 hours.    Invalid input(s): "[id number]"   Recent Labs   Lab 09/25/23  1136   INR 1.1   APTT 22.7      Recent Labs   Lab 09/25/23  1453   TROPONINI 0.011       Electrolytes: N/A - electrolytes WDL  Accuchecks: none    Gtts:      LDA/Wounds:  Lines/Drains/Airways       Drain  Duration             Female External Urinary Catheter 09/25/23 1100 <1 day              Peripheral Intravenous Line  Duration                  Peripheral IV - Single Lumen 09/25/23 1100 20 G Posterior;Right Hand <1 day         Peripheral IV - Single Lumen 09/25/23 1100 " 20 G;1 1/4 in Posterior;Right Forearm <1 day                  Wounds: No  Wound care consulted: No

## 2023-09-25 NOTE — ASSESSMENT & PLAN NOTE
Bl upper quadrant abdominal pain and CT abdomen pelvis revealed colitis. She was started on cipro and flagyl. Has not had any bowel movements since 9/18 despite aggressive bowel regimen at OSH.     · Cipro and flagyl   · Severe distention on exam with pain to light palpation worse in bl upper quadrants than lower  · KUB

## 2023-09-25 NOTE — ASSESSMENT & PLAN NOTE
NSTEMI with troponin .6 then 1.1 and was started on ASA and plavix at OSH  · Troponin  · EKG  · Echo   · BNP   · Hold asa and plavix pending MRI

## 2023-09-26 PROBLEM — G37.3 ACUTE TRANSVERSE MYELITIS: Status: ACTIVE | Noted: 2023-09-25

## 2023-09-26 LAB
BACTERIA UR CULT: NO GROWTH
BASOPHILS # BLD AUTO: 0.06 K/UL (ref 0–0.2)
BASOPHILS NFR BLD: 0.9 % (ref 0–1.9)
DIFFERENTIAL METHOD: ABNORMAL
EOSINOPHIL # BLD AUTO: 0.1 K/UL (ref 0–0.5)
EOSINOPHIL NFR BLD: 2 % (ref 0–8)
ERYTHROCYTE [DISTWIDTH] IN BLOOD BY AUTOMATED COUNT: 12.4 % (ref 11.5–14.5)
HCT VFR BLD AUTO: 37.2 % (ref 37–48.5)
HGB BLD-MCNC: 12.9 G/DL (ref 12–16)
IMM GRANULOCYTES # BLD AUTO: 0.1 K/UL (ref 0–0.04)
IMM GRANULOCYTES NFR BLD AUTO: 1.5 % (ref 0–0.5)
LYMPHOCYTES # BLD AUTO: 1.8 K/UL (ref 1–4.8)
LYMPHOCYTES NFR BLD: 26.1 % (ref 18–48)
MCH RBC QN AUTO: 30.9 PG (ref 27–31)
MCHC RBC AUTO-ENTMCNC: 34.7 G/DL (ref 32–36)
MCV RBC AUTO: 89 FL (ref 82–98)
MONOCYTES # BLD AUTO: 0.9 K/UL (ref 0.3–1)
MONOCYTES NFR BLD: 13.7 % (ref 4–15)
NEUTROPHILS # BLD AUTO: 3.8 K/UL (ref 1.8–7.7)
NEUTROPHILS NFR BLD: 55.8 % (ref 38–73)
NRBC BLD-RTO: 0 /100 WBC
PLATELET # BLD AUTO: 233 K/UL (ref 150–450)
PMV BLD AUTO: 9.8 FL (ref 9.2–12.9)
RBC # BLD AUTO: 4.17 M/UL (ref 4–5.4)
WBC # BLD AUTO: 6.85 K/UL (ref 3.9–12.7)

## 2023-09-26 PROCEDURE — 86053 AQAPRN-4 ANTB FLO CYTMTRY EA: CPT | Performed by: NURSE PRACTITIONER

## 2023-09-26 PROCEDURE — 99291 CRITICAL CARE FIRST HOUR: CPT | Mod: ,,, | Performed by: PSYCHIATRY & NEUROLOGY

## 2023-09-26 PROCEDURE — 92523 SPEECH SOUND LANG COMPREHEN: CPT

## 2023-09-26 PROCEDURE — 51798 US URINE CAPACITY MEASURE: CPT

## 2023-09-26 PROCEDURE — 63600175 PHARM REV CODE 636 W HCPCS: Performed by: REGISTERED NURSE

## 2023-09-26 PROCEDURE — 94761 N-INVAS EAR/PLS OXIMETRY MLT: CPT

## 2023-09-26 PROCEDURE — 99223 1ST HOSP IP/OBS HIGH 75: CPT | Mod: ,,, | Performed by: NEUROLOGICAL SURGERY

## 2023-09-26 PROCEDURE — 92610 EVALUATE SWALLOWING FUNCTION: CPT

## 2023-09-26 PROCEDURE — 99223 PR INITIAL HOSPITAL CARE,LEVL III: ICD-10-PCS | Mod: ,,, | Performed by: NEUROLOGICAL SURGERY

## 2023-09-26 PROCEDURE — 25500020 PHARM REV CODE 255: Performed by: PSYCHIATRY & NEUROLOGY

## 2023-09-26 PROCEDURE — 51701 INSERT BLADDER CATHETER: CPT

## 2023-09-26 PROCEDURE — 99291 PR CRITICAL CARE, E/M 30-74 MINUTES: ICD-10-PCS | Mod: ,,, | Performed by: PSYCHIATRY & NEUROLOGY

## 2023-09-26 PROCEDURE — 25000003 PHARM REV CODE 250

## 2023-09-26 PROCEDURE — 25000003 PHARM REV CODE 250: Performed by: PSYCHIATRY & NEUROLOGY

## 2023-09-26 PROCEDURE — 20000000 HC ICU ROOM

## 2023-09-26 PROCEDURE — A9585 GADOBUTROL INJECTION: HCPCS | Performed by: PSYCHIATRY & NEUROLOGY

## 2023-09-26 PROCEDURE — 25000003 PHARM REV CODE 250: Performed by: REGISTERED NURSE

## 2023-09-26 PROCEDURE — 63600175 PHARM REV CODE 636 W HCPCS

## 2023-09-26 RX ORDER — MORPHINE SULFATE 2 MG/ML
2 INJECTION, SOLUTION INTRAMUSCULAR; INTRAVENOUS EVERY 4 HOURS PRN
Status: DISCONTINUED | OUTPATIENT
Start: 2023-09-26 | End: 2023-09-27

## 2023-09-26 RX ORDER — MUPIROCIN 20 MG/G
OINTMENT TOPICAL 2 TIMES DAILY
Status: COMPLETED | OUTPATIENT
Start: 2023-09-26 | End: 2023-09-30

## 2023-09-26 RX ORDER — ADHESIVE BANDAGE
30 BANDAGE TOPICAL DAILY
Status: DISCONTINUED | OUTPATIENT
Start: 2023-09-26 | End: 2023-09-28

## 2023-09-26 RX ORDER — BISACODYL 10 MG
10 SUPPOSITORY, RECTAL RECTAL DAILY
Status: DISCONTINUED | OUTPATIENT
Start: 2023-09-27 | End: 2023-09-27

## 2023-09-26 RX ORDER — OXYCODONE HYDROCHLORIDE 5 MG/1
5 TABLET ORAL EVERY 4 HOURS PRN
Status: DISCONTINUED | OUTPATIENT
Start: 2023-09-26 | End: 2023-10-11 | Stop reason: HOSPADM

## 2023-09-26 RX ORDER — GADOBUTROL 604.72 MG/ML
10 INJECTION INTRAVENOUS
Status: COMPLETED | OUTPATIENT
Start: 2023-09-26 | End: 2023-09-26

## 2023-09-26 RX ADMIN — CIPROFLOXACIN 500 MG: KIT at 09:09

## 2023-09-26 RX ADMIN — FAMOTIDINE 20 MG: 20 TABLET ORAL at 08:09

## 2023-09-26 RX ADMIN — FAMOTIDINE 20 MG: 20 TABLET ORAL at 09:09

## 2023-09-26 RX ADMIN — METRONIDAZOLE 500 MG: 500 INJECTION, SOLUTION INTRAVENOUS at 08:09

## 2023-09-26 RX ADMIN — METRONIDAZOLE 500 MG: 500 INJECTION, SOLUTION INTRAVENOUS at 05:09

## 2023-09-26 RX ADMIN — IOHEXOL 100 ML: 350 INJECTION, SOLUTION INTRAVENOUS at 11:09

## 2023-09-26 RX ADMIN — MUPIROCIN: 20 OINTMENT TOPICAL at 09:09

## 2023-09-26 RX ADMIN — OXYCODONE HYDROCHLORIDE 5 MG: 5 TABLET ORAL at 02:09

## 2023-09-26 RX ADMIN — ATORVASTATIN CALCIUM 40 MG: 40 TABLET, FILM COATED ORAL at 09:09

## 2023-09-26 RX ADMIN — MUPIROCIN: 20 OINTMENT TOPICAL at 08:09

## 2023-09-26 RX ADMIN — METRONIDAZOLE 500 MG: 500 INJECTION, SOLUTION INTRAVENOUS at 01:09

## 2023-09-26 RX ADMIN — CIPROFLOXACIN 500 MG: KIT at 08:09

## 2023-09-26 RX ADMIN — MIDAZOLAM 0.5 MG: 1 INJECTION INTRAMUSCULAR; INTRAVENOUS at 12:09

## 2023-09-26 RX ADMIN — GADOBUTROL 10 ML: 604.72 INJECTION INTRAVENOUS at 01:09

## 2023-09-26 RX ADMIN — OXYCODONE HYDROCHLORIDE 5 MG: 5 TABLET ORAL at 05:09

## 2023-09-26 NOTE — PT/OT/SLP PROGRESS
Occupational Therapy      Patient Name:  Rhina Forrest   MRN:  [id number]    Patient not seen today secondary to further diagnosis via spinal angiogram. OT ordered acknowledged. Will follow-up as appropriate.    9/26/2023

## 2023-09-26 NOTE — PT/OT/SLP PROGRESS
Physical Therapy      Patient Name:  Rhina Forrest   MRN:  [id number]    Patient not seen today secondary to therapist assessment. Patient pending spinal angiogram to evaluate for possible cord ischemia. PT to evaluate pt after spinal angiogram. Will follow-up as scheduled.

## 2023-09-26 NOTE — PLAN OF CARE
[facility]  Initial Discharge Assessment       Primary Care Provider: Cal Alvarado FNP-SALONI      Admission Diagnosis: Edema of spinal cord [G95.19]    Admission Date: 9/25/2023  Expected Discharge Date: 10/4/2023    Transition of Care Barriers: None    Payor: MEDICARE / Plan: MEDICARE PART A & B / Product Type: Government /     Extended Emergency Contact Information  Primary Emergency Contact: Shea Matson  Mobile Phone: [phone]  Relation: Friend  Preferred language: English   needed? No    Discharge Plan A: Rehab  Discharge Plan B: Home Health        [facility] [id number]  [place]  [address]  [address]  [place] [address]  Phone: [phone] Fax: [phone]      Transferred from:  [facility]     Past Medical History:   Diagnosis Date    Abnormal heart rhythm     Acid reflux     Bile duct perforation     Diverticular disease of large intestine without perforation or abscess     Essential (primary) hypertension     Fatigue     Gastritis     GERD (gastroesophageal reflux disease)     Hepatic dysfunction          CM met with patient in room for Discharge Planning Assessment.  Patient is able to answer questions.  Per patient, she lives alone in a 1st floor apartment with 0 step(s) to enter.   Per patient, she was independent with ADLS and used a rollator for ambulation.  Patient will have assistance from Shea Matson (granddaughter in law) [phone], Benji Matson (grandson) upon discharge.   Discharge Planning Booklet given to patient/family and discussed.  All questions addressed.  CM will follow for needs.        Initial Assessment (most recent)       Adult Discharge Assessment - 09/26/23 1552          Discharge Assessment    Assessment Type Discharge Planning Assessment     Confirmed/corrected address, phone number and insurance Yes     Confirmed Demographics Correct on Facesheet     Source of Information patient     Communicated DEMETRICE with  patient/caregiver Date not available/Unable to determine     Reason For Admission Spinal Cord Edema     People in Home alone     Facility Arrived From: [facility]     Do you expect to return to your current living situation? Yes     Do you have help at home or someone to help you manage your care at home? Yes     Who are your caregiver(s) and their phone number(s)? Shea Matson (granddaughter in law) [phone], Benji Matson (grandson)     Prior to hospitilization cognitive status: Alert/Oriented     Current cognitive status: Alert/Oriented     Walking or Climbing Stairs ambulation difficulty, requires equipment     Mobility Management rollator     Dressing/Bathing --   independent    Do you have any problems with: Errands/Grocery     Home Layout Able to live on 1st floor     Equipment Currently Used at Home rollator     Readmission within 30 days? No     Patient currently being followed by outpatient case management? No     Do you currently have service(s) that help you manage your care at home? No     Do you take prescription medications? Yes     Do you have prescription coverage? Yes     Coverage Medicare/Medicaid     Do you have any problems affording any of your prescribed medications? No     Is the patient taking medications as prescribed? yes     Who is going to help you get home at discharge? Shea Matson (granddaughter in law) [phone], Benji Matson (grandson)     How do you get to doctors appointments? family or friend will provide     Are you on dialysis? No     Do you take coumadin? No     DME Needed Upon Discharge  other (see comments)   tbd    Discharge Plan discussed with: Patient     Transition of Care Barriers None     Discharge Plan A Rehab     Discharge Plan B Home Health        Physical Activity    On average, how many days per week do you engage in moderate to strenuous exercise (like a brisk walk)? Patient refused     On average, how many minutes do you engage in exercise at  this level? Patient refused        Financial Resource Strain    How hard is it for you to pay for the very basics like food, housing, medical care, and heating? Patient refused        Housing Stability    In the last 12 months, was there a time when you were not able to pay the mortgage or rent on time? Patient refused     In the last 12 months, was there a time when you did not have a steady place to sleep or slept in a shelter (including now)? Patient refused        Transportation Needs    In the past 12 months, has lack of transportation kept you from medical appointments or from getting medications? Patient refused     In the past 12 months, has lack of transportation kept you from meetings, work, or from getting things needed for daily living? Patient refused        Food Insecurity    Within the past 12 months, you worried that your food would run out before you got the money to buy more. Patient refused     Within the past 12 months, the food you bought just didn't last and you didn't have money to get more. Patient refused        Stress    Do you feel stress - tense, restless, nervous, or anxious, or unable to sleep at night because your mind is troubled all the time - these days? Patient refused        Social Connections    In a typical week, how many times do you talk on the phone with family, friends, or neighbors? Patient refused     How often do you get together with friends or relatives? Patient refused     How often do you attend Alevism or Orthodox services? Patient refused     Do you belong to any clubs or organizations such as Alevism groups, unions, fraternal or athletic groups, or school groups? Patient refused     How often do you attend meetings of the clubs or organizations you belong to? Patient refused     Are you , , , , never , or living with a partner? Patient refused        Alcohol Use    Q1: How often do you have a drink containing alcohol? Patient  refused     Q2: How many drinks containing alcohol do you have on a typical day when you are drinking? Patient refused     Q3: How often do you have six or more drinks on one occasion? Patient refused                          Yenifer Carvalho RN, CCRN-K, [facility]  Neuro-Critical Care   [phone]

## 2023-09-26 NOTE — PLAN OF CARE
Problem: SLP  Goal: SLP Goal  Outcome: Met     Bedside swallow assessment completed.  Rec regular diet with thin liquids with standard aspiration precautions.

## 2023-09-26 NOTE — CONSULTS
HPI:   67F w/ PMH GERD, HTN, smoker, depression who presents with bilateral paraplegia and spinal cord edema from T9-conus; Neuro-IR consulted for IR angiogram to rule-out vascular pathology. Patient states that her symptoms began acutely after heavy lifting on 9/18/23. Pt walks with a walker at baseline and lives at home alone. On 9/18 she was walking home with groceries on her walker and lifted the walker over a curb and hurt her back. Pt was able to walk home and put her groceries away. Once she sat on the couch, she was unable to get back up as her legs stopped moving. She also had decreased sensation from T12 down. Pt called 911 and was taken via EMS to [facility]. MRI cervical and thoracic spine revealed stenosis and T9-L1 cord edema with mild enhancement suspicious for evolving cord infarct vs demyelinating process per radiology report (no imaging was sent with patient). Pt was found to have NSTEMI with troponin 1.1 and was started on ASA and plavix. Pt also c/o bl upper quadrant abdominal pain and CT abdomen pelvis revealed colitis. She was started on cipro and flagyl. Pt has had a barber since 9/18 and has not had any bowel movements since 9/18 despite aggressive bowel regimen at OSH. [place] [facility] [place] further workup. Pt currently c/o severe abdominal pain, n/v, severe back pain, BUE rash x 1 month, BLE plegia and T12 sensory level.       ROS:  10-point ROS otherwise negative      Objective:  General: well developed, well nourished, no distress.   Head: normocephalic, atraumatic  Neurologic: Alert and oriented. Thought content appropriate.  GCS: Motor: 6/Verbal: 5/Eyes: 4 GCS Total: 15  Mental Status: Awake, Alert, Oriented x 4  Language: No aphasia  Speech: No dysarthria  Cranial nerves: face symmetric, tongue midline, CN II-XII grossly intact.   Eyes: pupils equal, round, reactive to light with accommodation, EOMI.   Pulmonary: normal respirations, no  signs of respiratory distress  Abdomen: distended and painful to palpation in all quadrants   Skin: Skin is warm, dry and intact.  Sensory: T12 sensory level  Motor Strength: 5/5 strength in BUE; 0/5 in BLE  Rectal tone is absent      A&P:  67F w/ PMH GERD, HTN, smoker, depression who presents with bilateral paraplegia and spinal cord edema from T9-conus; Neuro-IR consulted for IR angiogram to rule-out vascular pathology:    --Patient evaluated prior to procedure; moderate sedation          -ASA 2/Mallampati 2  --Plan for spinal angiogram  --All diagnostics and imaging reviewed  --Patient NPO since MN  --Risks & benefits of procedure explained in detail; patient consented and all questions answered  --Further reccs to follow procedure    Dispo: Ongoing        Unchanged.

## 2023-09-26 NOTE — CONSULTS
[facility] Critical [facility]  Adult Nutrition  Consult Note    SUMMARY     Recommendations    1. If able to tolerate PO intake, ADAT to regular- texture per SLP.   - If PO intake <50%, add Boost Plus BID.     2. If EN recs warranted: Isosource 1.5 @ goal rate of 40 mL/hr- provides 1440 kcals, 65 g pro, and 733 mL fluids.    - If alternative formula warranted: [employer] Standard 1.4 @ goal rate of 40 mL/hr- provides 1344 kcals, 60 g pro, and 691 mL fluid.    3. RD following.    Goals: Will meet % EEN/EPN by next RD f/u.  Nutrition Goal Status: new  Communication of RD Recs:  (POC)    Assessment and Plan    Nutrition Problem  Inadequate oral intake     Related to (etiology):   Inability to consume sufficient needs     Signs and Symptoms (as evidenced by):   NPO status      Interventions/Recommendations (treatment strategy):  Collaboration of nutrition care with other providers      Nutrition Diagnosis Status:   New    Reason for Assessment    Reason For Assessment: consult  Diagnosis:  (Spinal cord edema spanning T9-L1)  Relevant Medical History: NSTEMI, GERD, colitis  Interdisciplinary Rounds: did not attend  General Information Comments: RD consulted to assess dietary needs. Unable to speak with pt 2/2 being unarousable to stimuli during time of RD visit attempt. Noted plans for spinal angiogram today. Unsure nutritional status PTA. Noted pt with c/o upper quadrant abdominal pain and CT abdominal pelvis related colitis PTA. Remains NPO at this time for procedure/SLP eval. Noted issues with n/v PTA. No wt hx per chart review; #. No s/s of malnutrition, appears well-nourished. LBM 9/26.  Nutrition Discharge Planning: Pending clinical course    Nutrition Risk Screen    Nutrition Risk Screen: no indicators present    Nutrition/Diet History    Spiritual, Cultural Beliefs, Anabaptism Practices, Values that Affect Care: no  Food Allergies: other (see comments) (Latex)  Factors Affecting Nutritional Intake:  "abdominal pain, NPO    Anthropometrics    Temp: 98.1 °F (36.7 °C)  Height Method: Measured  Height: 5' 4" (162.6 cm)  Height (inches): 64 in  Weight Method: Bed Scale  Weight: 91.6 kg (201 lb 15.1 oz)  Weight (lb): 201.94 lb  Ideal Body Weight (IBW), Female: 120 lb  % Ideal Body Weight, Female (lb): 168.28 %  BMI (Calculated): 34.6  BMI Grade: 30 - 34.9- obesity - grade I    Lab/Procedures/Meds    Pertinent Labs Reviewed: reviewed  Pertinent Labs Comments: Sodium 132, Calcium 8.5, Al Phos 212, Albumin 2.3, , ALT 94, Cholesterol 79, A1C 5.9%  Pertinent Medications Reviewed: reviewed  Pertinent Medications Comments: atorvastatin, bisacodyl, famotidine, senna-docusate    Estimated/Assessed Needs    Weight Used For Calorie Calculations: 91.6 kg (201 lb 15.1 oz)  Energy Calorie Requirements (kcal): 1431 kcals  Energy Need Method: Era-[place] (MSJ x 1.0 PAL)  Protein Requirements: 64-73 g (0.7-0.8 g/kg)  Weight Used For Protein Calculations: 91.6 kg (201 lb 15.1 oz)  Fluid Requirements (mL): 1 mL/kcal or fluid per MD  Estimated Fluid Requirement Method: RDA Method  RDA Method (mL): 1431    Nutrition Prescription Ordered    Current Diet Order: NPO    Evaluation of Received Nutrient/Fluid Intake    I/O: -978 mL since admit  % Intake of Estimated Energy Needs: 0%  % Meal Intake: NPO    Nutrition Risk    Level of Risk/Frequency of Follow-up:  (1 time/week)     Monitor and Evaluation    Food and Nutrient Intake: energy intake, food and beverage intake, enteral nutrition intake  Food and Nutrient Adminstration: diet order, enteral and parenteral nutrition administration  Knowledge/Beliefs/Attitudes: beliefs and attitudes, food and nutrition knowledge/skill  Physical Activity and Function: nutrition-related ADLs and IADLs  Anthropometric Measurements: body mass index, weight change, weight, height/length  Biochemical Data, Medical Tests and Procedures: lipid profile, inflammatory profile, glucose/endocrine profile, " gastrointestinal profile, electrolyte and renal panel  Nutrition-Focused Physical Findings: overall appearance     Nutrition Follow-Up    RD Follow-up?: Yes    Emilie Colon RDN,LDN

## 2023-09-26 NOTE — HOSPITAL COURSE
9/26:  No acute events overnight. Pt seen in ICU. Exam stable.  9/27: WOJCIECH, refused LP. Tentative spinal angio today. MRI spine complete overnight.   9/28: WOJCIECH, exam stable, pending spinal angio  9/30: WOJCIECH. Awaiting spinal angio. Clinically stable.   10/1: WOJCIECH. DSA Monday.

## 2023-09-26 NOTE — SUBJECTIVE & OBJECTIVE
Objective:     Vitals:  Temp: 99.1 °F (37.3 °C)  Pulse: 71  Rhythm: normal sinus rhythm  BP: (!) 145/68  MAP (mmHg): 98  Resp: (!) 27  SpO2: (!) 94 %    Temp  Min: 97.9 °F (36.6 °C)  Max: 99.1 °F (37.3 °C)  Pulse  Min: 61  Max: 78  BP  Min: 113/60  Max: 152/71  MAP (mmHg)  Min: 76  Max: 134  Resp  Min: 19  Max: 50  SpO2  Min: 91 %  Max: 96 %    09/25 0701 - 09/26 0700  In: 427.1 [P.O.:250]  Out: 1420 [Urine:1420]   Unmeasured Output  Stool Occurrence: 1        Physical exam  Vital signs, lab studies, and imaging reviewed by me  Alert, oriented x3, cranial II-XII intact, T12 sensory level, le flaccid weakness with depressed dtr  Warm and well perfused, regular rate and rhythm, no murmurs  No dependent edema  Breathing comfortably, breath sounds clear  Belly soft, nontender, no hepatosplenomegaly         Medications:  Continuous Scheduledatorvastatin, 40 mg, Daily  [START ON 9/27/2023] bisacodyL, 10 mg, Daily  ciprofloxacin 250 mg/5 ml, 500 mg, Q12H  famotidine, 20 mg, BID  magnesium hydroxide 400 mg/5 ml, 30 mL, Daily  metronidazole, 500 mg, Q8H  mupirocin, , BID  polyethylene glycol, 17 g, Daily  senna-docusate 8.6-50 mg, 1 tablet, BID    PRNhydrALAZINE, 10 mg, Q6H PRN  midazolam, 0.5 mg, Q5 Min PRN  morphine, 2 mg, Q4H PRN  ondansetron, 4 mg, Q8H PRN  oxyCODONE, 5 mg, Q4H PRN

## 2023-09-26 NOTE — SUBJECTIVE & OBJECTIVE
Interval History: 9/26:  No acute events overnight. Pt seen in ICU. Exam stable.    Medications:  Continuous Infusions:  Scheduled Meds:   atorvastatin  40 mg Oral Daily    bisacodyL  10 mg Rectal Daily    ciprofloxacin 250 mg/5 ml  500 mg Oral Q12H    famotidine  20 mg Oral BID    magnesium hydroxide 400 mg/5 ml  30 mL Oral Daily    metronidazole  500 mg Intravenous Q8H    mupirocin   Nasal BID    polyethylene glycol  17 g Oral Daily    senna-docusate 8.6-50 mg  1 tablet Oral BID     PRN Meds:hydrALAZINE, midazolam, morphine, ondansetron, oxyCODONE     Review of Systems  Objective:     Weight: 91.6 kg (202 lb)  Body mass index is 34.67 kg/m².  Vital Signs (Most Recent):  Temp: 98.1 °F (36.7 °C) (09/26/23 0705)  Pulse: 66 (09/26/23 0833)  Resp: (!) 33 (09/26/23 0805)  BP: 139/65 (09/26/23 0805)  SpO2: 96 % (09/26/23 0805) Vital Signs (24h Range):  Temp:  [97.8 °F (36.6 °C)-98.1 °F (36.7 °C)] 98.1 °F (36.7 °C)  Pulse:  [61-78] 66  Resp:  [18-50] 33  SpO2:  [91 %-97 %] 96 %  BP: (113-169)/() 139/65     Date 09/26/23 0700 - 09/27/23 0659   Shift 5809-8299 8005-0715 2840-6772 24 Hour Total   INTAKE   IV Piggyback 15   15   Shift Total(mL/kg) 15(0.2)   15(0.2)   OUTPUT   Shift Total(mL/kg)       Weight (kg) 91.6 91.6 91.6 91.6                       Female External Urinary Catheter 09/25/23 1100 (Active)   Skin no redness;no breakdown 09/26/23 0705   Tolerance no signs/symptoms of discomfort 09/26/23 0705   Output (mL) 700 mL 09/25/23 2316          Physical Exam  Constitutional:       Appearance: She is well-developed and well-nourished.   Eyes:      Pupils: Pupils are equal, round, and reactive to light.   Cardiovascular:      Pulses: Normal pulses.   Abdominal:      Palpations: Abdomen is soft.   Neurological:      Mental Status: She is alert and oriented to person, place, and time.              Physical Exam:    Constitutional: She appears well-developed and well-nourished.     Eyes: Pupils are equal, round, and  reactive to light.     Cardiovascular: Normal pulses.     Abdominal: Soft.     Psych/Behavior: She is alert. She is oriented to person, place, and time.     Musculoskeletal:        Neck: Range of motion is full.     Pulm Comf on RA      Neuro Exam: 0/5 in BLE; 5/5 in BUE; T12 sensory level; no rectal tone, no spasticity    Significant Labs:  Recent Labs   Lab 09/25/23  2242      *   K 4.8      CO2 25   BUN 18   CREATININE 0.6   CALCIUM 8.5*   MG 2.2     Recent Labs   Lab 09/25/23  2242   WBC 6.85   HGB 12.9   HCT 37.2        Recent Labs   Lab 09/25/23  1136   INR 1.1   APTT 22.7     Microbiology Results (last 7 days)       Procedure Component Value Units Date/Time    Blood culture [[id number]] Collected: 09/25/23 1450    Order Status: Completed Specimen: Blood from Peripheral, Hand, Left Updated: 09/26/23 0115     Blood Culture, Routine No Growth to date    Blood culture [[id number]] Collected: 09/25/23 1449    Order Status: Completed Specimen: Blood from Peripheral, Antecubital, Right Updated: 09/26/23 0115     Blood Culture, Routine No Growth to date    Urine culture [[id number]] Collected: 09/25/23 1519    Order Status: No result Specimen: Urine Updated: 09/25/23 1617    Culture, Respiratory with Gram Stain [[id number]]     Order Status: No result Specimen: Respiratory           All pertinent labs from the last 24 hours have been reviewed.    Significant Diagnostics:  I have reviewed all pertinent imaging results/findings within the past 24 hours.

## 2023-09-26 NOTE — ASSESSMENT & PLAN NOTE
Bl upper quadrant abdominal pain and CT abdomen pelvis revealed colitis. She was started on cipro and flagyl. Has not had any bowel movements since 9/18 despite aggressive bowel regimen at OSH.   No bowel inflammation on CT performed today  Improved symptoms w bowel movement    -dc abx  -cont aggressive bowel reg

## 2023-09-26 NOTE — ASSESSMENT & PLAN NOTE
66yo F PMHx GERD, HTN, smoker, depression presenting with bilateral lower extremity paresis and paresthesia that began after heavy lifting on 9/18/23. OSH MRI cervical and thoracic spine revealed stenosis and T9-L1 cord edema with mild enhancement.    Sudden onset with valsalva and evolution over 3 hours most suspicious for a vascular lesion, ddx includes demyelination and infectious processes     -q4 neuro checks, sleep holiday  -permissive htn, aggressive bowel/bladder care, aggressive pulm toilet  -awaiting mra and dwi spine, spinal angio  -refused LP today, will readdress tomorrow  -send serum NMO  -pt/ot, diet, oob as able

## 2023-09-26 NOTE — PROGRESS NOTES
[facility]  Neurosurgery  Progress Note    Subjective:     History of Present Illness: Rhina Forrest is a 68yo F PMHx GERD, HTN, smoker, depression presenting with bilateral lower extremity plegia that began after heavy lifting on 9/18/23. Pt walks with a walker at baseline and lives at home alone. On 9/18 she was walking home with groceries on her walker and lifted the walker over a curb and hurt her back. Pt was able to walk home and put her groceries away. Once she sat on the couch, she was unable to get back up as her legs stopped moving. She also had decreased sensation from T12 down. Pt called 911 and was taken via EMS to [facility]. MRI cervical and thoracic spine revealed stenosis and T9-L1 cord edema with mild enhancement suspicious for evolving cord infarct vs demyelinating process per radiology report (no imaging was sent with patient). Pt was found to have NSTEMI with troponin 1.1 and was started on ASA and plavix. Pt also c/o bl upper quadrant abdominal pain and CT abdomen pelvis revealed colitis. She was started on cipro and flagyl. Pt has had a barber since 9/18 and has not had any bowel movements since 9/18 despite aggressive bowel regimen at OSH. [place] [facility] and further workup. Pt currently c/o severe abdominal pain, n/v, severe back pain, BUE rash x 1 month, BLE plegia and T12 sensory level.       Post-Op Info:  * No surgery found *         Interval History: 9/26:  No acute events overnight. Pt seen in ICU. Exam stable.    Medications:  Continuous Infusions:  Scheduled Meds:   atorvastatin  40 mg Oral Daily    bisacodyL  10 mg Rectal Daily    ciprofloxacin 250 mg/5 ml  500 mg Oral Q12H    famotidine  20 mg Oral BID    magnesium hydroxide 400 mg/5 ml  30 mL Oral Daily    metronidazole  500 mg Intravenous Q8H    mupirocin   Nasal BID    polyethylene glycol  17 g Oral Daily    senna-docusate 8.6-50 mg  1 tablet Oral  BID     PRN Meds:hydrALAZINE, midazolam, morphine, ondansetron, oxyCODONE     Review of Systems  Objective:     Weight: 91.6 kg (202 lb)  Body mass index is 34.67 kg/m².  Vital Signs (Most Recent):  Temp: 98.1 °F (36.7 °C) (09/26/23 0705)  Pulse: 66 (09/26/23 0833)  Resp: (!) 33 (09/26/23 0805)  BP: 139/65 (09/26/23 0805)  SpO2: 96 % (09/26/23 0805) Vital Signs (24h Range):  Temp:  [97.8 °F (36.6 °C)-98.1 °F (36.7 °C)] 98.1 °F (36.7 °C)  Pulse:  [61-78] 66  Resp:  [18-50] 33  SpO2:  [91 %-97 %] 96 %  BP: (113-169)/() 139/65     Date 09/26/23 0700 - 09/27/23 0659   Shift 4676-2064 4331-3156 4042-6305 24 Hour Total   INTAKE   IV Piggyback 15   15   Shift Total(mL/kg) 15(0.2)   15(0.2)   OUTPUT   Shift Total(mL/kg)       Weight (kg) 91.6 91.6 91.6 91.6                       Female External Urinary Catheter 09/25/23 1100 (Active)   Skin no redness;no breakdown 09/26/23 0705   Tolerance no signs/symptoms of discomfort 09/26/23 0705   Output (mL) 700 mL 09/25/23 2316          Physical Exam  Constitutional:       Appearance: She is well-developed and well-nourished.   Eyes:      Pupils: Pupils are equal, round, and reactive to light.   Cardiovascular:      Pulses: Normal pulses.   Abdominal:      Palpations: Abdomen is soft.   Neurological:      Mental Status: She is alert and oriented to person, place, and time.              Physical Exam:    Constitutional: She appears well-developed and well-nourished.     Eyes: Pupils are equal, round, and reactive to light.     Cardiovascular: Normal pulses.     Abdominal: Soft.     Psych/Behavior: She is alert. She is oriented to person, place, and time.     Musculoskeletal:        Neck: Range of motion is full.     Pulm Comf on RA      Neuro Exam: 0/5 in BLE; 5/5 in BUE; T12 sensory level; no rectal tone, no spasticity    Significant Labs:  Recent Labs   Lab 09/25/23  2242      *   K 4.8      CO2 25   BUN 18   CREATININE 0.6   CALCIUM 8.5*   MG 2.2      Recent Labs   Lab 09/25/23  2242   WBC 6.85   HGB 12.9   HCT 37.2        Recent Labs   Lab 09/25/23  1136   INR 1.1   APTT 22.7     Microbiology Results (last 7 days)       Procedure Component Value Units Date/Time    Blood culture [[id number]] Collected: 09/25/23 1450    Order Status: Completed Specimen: Blood from Peripheral, Hand, Left Updated: 09/26/23 0115     Blood Culture, Routine No Growth to date    Blood culture [[id number]] Collected: 09/25/23 1449    Order Status: Completed Specimen: Blood from Peripheral, Antecubital, Right Updated: 09/26/23 0115     Blood Culture, Routine No Growth to date    Urine culture [[id number]] Collected: 09/25/23 1519    Order Status: No result Specimen: Urine Updated: 09/25/23 1617    Culture, Respiratory with Gram Stain [[id number]]     Order Status: No result Specimen: Respiratory           All pertinent labs from the last 24 hours have been reviewed.    Significant Diagnostics:  I have reviewed all pertinent imaging results/findings within the past 24 hours.    Assessment/Plan:     * Spinal cord edema spanning T9-L1  66yo F PMHx GERD, HTN, smoker, depression presenting with bilateral lower extremity plegia that began after heavy lifting on 9/18/23. OSH radiology report for MRI saying cord edema from T9-L1, no disc sent with patient.     MRI Brain w/wo 9/25: patchy periventricular white matter T2/flair hyperintensities demylination vs microvascular disease  MRI C/T/L spine 9/25: Abnormal cord edema from T9 to conus    --Patient admitted to [facility]   -q1h neurochecks in ICU  --All labs and diagnostics reviewed  --Cord findings suspicious for mass vs ischemia vs demyelinating disorder  --Spinal angio to evaluate for possible cord ischemia, keep NPO for possibly today  --Recommend LP and demyelination protocol MRI to evaluate for demyelinating disorder  --MAP goals >85 at this time   --Bladder scans q6h with I&O cath   --Follow-up full pre-op labs  (CBC/CMP/PT-INR/PTT/T&S)  --Infectious work up pending  --on ASA and plavix for recent NSTEMI - hold in acute setting   --NPO at this time for possible operative intervention  --Continue to monitor clinically, notify NSGY immediately with any changes in neuro status    Discussed with Dr. Cornel Kahn MD  Neurosurgery  [facility]

## 2023-09-26 NOTE — PT/OT/SLP EVAL
"Speech Language Pathology Evaluation  Cognitive/Bedside Swallow  Discharge    Patient Name:  Rhina Forrest   MRN:  [id number]  Admitting Diagnosis: Traumatic edema of thoracic spinal cord    Recommendations:                  General Recommendations:  Follow-up not indicated  Diet recommendations:  Regular Diet - IDDSI Level 7, Thin liquids - IDDSI Level 0   Aspiration Precautions: Standard aspiration precautions   General Precautions: Standard, fall  Communication strategies:  go to room if call light pushed    Assessment:     Rhina Forrest is a 67 y.o. female with an SLP diagnosis of  WFL oropharyngeal swallow and baseline speech, language, cognition .     History:     Past Medical History:   Diagnosis Date    Abnormal heart rhythm     Acid reflux     Bile duct perforation     Diverticular disease of large intestine without perforation or abscess     Essential (primary) hypertension     Fatigue     Gastritis     GERD (gastroesophageal reflux disease)     Hepatic dysfunction        Past Surgical History:   Procedure Laterality Date    CHOLECYSTECTOMY         Social History: Patient lives alone.    Prior Intubation HX:  none    Modified Barium Swallow: none reported    Chest X-Rays: 9/25 FINDINGS: Heart size is normal, as is the appearance of the pulmonary vascularity.  Lung zones are clear, and are free of significant airspace consolidation or volume loss.  Minimal blunting of the left costophrenic sulcus is noted which may reflect only pleural scarring, with no pleural fluid of any substantial volume seen on either side.  No hilar or mediastinal mass lesion.  No pneumothorax.    Prior diet: regular/thins.    Subjective     Awake/alert  "I have trouble chewing hard foods because of my missing teeth"    Pain/Comfort:  Pain Rating 1: 7/10  Location 1: abdomen  Pain Rating 2: 7/10  Location 2: back    Respiratory Status: Room air    Objective:     Cognitive Status:    Pt is at baseline for cognitive status. " Repetition and clarification required throughout session  Arousal/Alertness Appropriate response to stimuli  Attention No obvious deficits observed    Orientation Oriented x4  Memory Immediate Recall 100% and Delayed 2/3 after 3 minute delay   Problem Solving Sequencing 1/1, Solutions 3/3, and Compare/contrast 1/1  Simple calculation 2/3       Receptive Language:   Comprehension:               [facility]    Pragmatics:    [facility]    Expressive Language:  Verbal:    Conversational speech WFL      Motor Speech:  [facility]    Voice:   Quality Rough and Gravelly , pt reports history of smoking and GERD    Visual-Spatial:  [facility]    Reading:   [facility]     Written Expression:   Did not assess    Oral Musculature Evaluation  Oral Musculature: WFL  Dentition: scattered dentition  Secretion Management: adequate  Mucosal Quality: adequate  Mandibular Strength and Mobility: [facility]  Oral Labial Strength and Mobility: [facility]  Lingual Strength and Mobility: [facility]  Volitional Cough: strong  Volitional Swallow: able to palpate hyolaryngeal rise  Voice Prior to PO Intake: harsh and gravelly    Bedside Swallow Eval:   Consistencies Assessed:  Thin liquids cup x2, straw x2  Puree 1 tsp x2  Solids 1/2 cracker x2      Oral Phase:   WFL  Prolonged mastication on solids 2/2 missing front teeth    Pharyngeal Phase:   no overt clinical signs/symptoms of aspiration  no overt clinical signs/symptoms of pharyngeal dysphagia    Compensatory Strategies  None    Treatment: SLP discussed with pt role of SLP, diet recs, swallow precautions, s/s of aspiration, risks associated with aspiration, and POC. Pt verbalized understanding of education provided and agreed with plan. Recommend regular diet/thin liquids. No further ST is warranted at this time.    Goals:   Multidisciplinary Problems       SLP Goals       Not on file                    Plan:     Patient to be seen:      Plan of Care expires:     Plan of Care reviewed with:  patient   SLP Follow-Up:  No       Discharge  recommendations:        Time Tracking:     SLP Treatment Date:   09/26/23  Speech Start Time:  0802  Speech Stop Time:  0825     Speech Total Time (min):  23 min    Billable Minutes: Eval 12  and Eval Swallow and Oral Function 11    09/26/2023

## 2023-09-26 NOTE — HOSPITAL COURSE
09/26/2023 naeon, improved abdominal pain w bowel movement, no change in exam  09/27/2023: NAEON. Antibiotics stopped. Pain control regimen adjusted. Patient refusing spinal angio today, will communicate with Neuro IR to see if she can be moved till tomorrow.   09/28/2023 continued pain control issues, awaiting further diagnostic davis  9/30/2023- NAEON, added sq heparin, decrease statin d/t mild transaminitis  10/1/23: possible trach/peg 10/2/23

## 2023-09-26 NOTE — ASSESSMENT & PLAN NOTE
68yo F PMHx GERD, HTN, smoker, depression presenting with bilateral lower extremity plegia that began after heavy lifting on 9/18/23. OSH radiology report for MRI saying cord edema from T9-L1, no disc sent with patient.     MRI Brain w/wo 9/25: patchy periventricular white matter T2/flair hyperintensities demylination vs microvascular disease  MRI C/T/L spine 9/25: Abnormal cord edema from T9 to conus    --Patient admitted to [facility]   -q1h neurochecks in ICU  --All labs and diagnostics reviewed  --Cord findings suspicious for mass vs ischemia vs demyelinating disorder  --Spinal angio to evaluate for possible cord ischemia, keep NPO for possibly today  --Recommend LP and demyelination protocol MRI to evaluate for demyelinating disorder  --MAP goals >85 at this time   --Bladder scans q6h with I&O cath   --Follow-up full pre-op labs (CBC/CMP/PT-INR/PTT/T&S)  --Infectious work up pending  --on ASA and plavix for recent NSTEMI - hold in acute setting   --NPO at this time for possible operative intervention  --Continue to monitor clinically, notify NSGY immediately with any changes in neuro status    Discussed with Dr. Unger

## 2023-09-26 NOTE — PROGRESS NOTES
[facility]  Neurocritical Care  Progress Note    Admit Date: 9/25/2023  Service Date: 09/26/2023  Length of Stay: 1    Subjective:     Chief Complaint: Acute transverse myelitis    History of Present Illness: Rhina Forrest is a 68yo F PMHx GERD, HTN, smoker, depression presenting with bilateral lower extremity paresis and paresthesia that began after heavy lifting on 9/18/23. Pt walks with a walker at baseline and lives at home alone. On 9/18 she was walking home with groceries on her walker and lifted the walker over a curb and hurt her back. Pt was able to walk home and put her groceries away. Once she sat on the couch, she was unable to get back up as her legs stopped moving. She also had decreased sensation from T12 down. Pt called 911 and was taken via EMS to [facility]. MRI cervical and thoracic spine revealed stenosis and T9-L1 cord edema with mild enhancement suspicious for evolving cord infarct vs demyelinating process. Pt was found to have NSTEMI with troponin 1.1 and was started on ASA and plavix. Pt also c/o bl upper quadrant abdominal pain and CT abdomen pelvis revealed colitis. She was started on cipro and flagyl. Pt has had a barber since 9/18 and has not had any bowel movements since 9/18 despite aggressive bowel regimen at OSH. Transferred to [facility] for neurosurgical evaluation and further workup. Pt currently c/o severe abdominal pain, n/v, severe back pain, BUE rash x 1 month, BLE paresis and paresthesia. Directly admitted to [facility] for higher level of care.       Hospital Course: 09/26/2023 naeon, improved abdominal pain w bowel movement, no change in exam          Objective:     Vitals:  Temp: 99.1 °F (37.3 °C)  Pulse: 71  Rhythm: normal sinus rhythm  BP: (!) 145/68  MAP (mmHg): 98  Resp: (!) 27  SpO2: (!) 94 %    Temp  Min: 97.9 °F (36.6 °C)  Max: 99.1 °F (37.3 °C)  Pulse  Min: 61  Max: 78  BP  Min: 113/60  Max: 152/71  MAP (mmHg)  Min: 76  Max: 134  Resp  Min:  19  Max: 50  SpO2  Min: 91 %  Max: 96 %    09/25 0701 - 09/26 0700  In: 427.1 [P.O.:250]  Out: 1420 [Urine:1420]   Unmeasured Output  Stool Occurrence: 1        Physical exam  Vital signs, lab studies, and imaging reviewed by me  Alert, oriented x3, cranial II-XII intact, T12 sensory level, le flaccid weakness with depressed dtr  Warm and well perfused, regular rate and rhythm, no murmurs  No dependent edema  Breathing comfortably, breath sounds clear  Belly soft, nontender, no hepatosplenomegaly         Medications:  Continuous Scheduledatorvastatin, 40 mg, Daily  [START ON 9/27/2023] bisacodyL, 10 mg, Daily  ciprofloxacin 250 mg/5 ml, 500 mg, Q12H  famotidine, 20 mg, BID  magnesium hydroxide 400 mg/5 ml, 30 mL, Daily  metronidazole, 500 mg, Q8H  mupirocin, , BID  polyethylene glycol, 17 g, Daily  senna-docusate 8.6-50 mg, 1 tablet, BID    PRNhydrALAZINE, 10 mg, Q6H PRN  midazolam, 0.5 mg, Q5 Min PRN  morphine, 2 mg, Q4H PRN  ondansetron, 4 mg, Q8H PRN  oxyCODONE, 5 mg, Q4H PRN          Assessment/Plan:     Neuro  * Acute transverse myelitis  66yo F PMHx GERD, HTN, smoker, depression presenting with bilateral lower extremity paresis and paresthesia that began after heavy lifting on 9/18/23. OSH MRI cervical and thoracic spine revealed stenosis and T9-L1 cord edema with mild enhancement.    Sudden onset with valsalva and evolution over 3 hours most suspicious for a vascular lesion, ddx includes demyelination and infectious processes     -q4 neuro checks, sleep holiday  -permissive htn, aggressive bowel/bladder care, aggressive pulm toilet  -awaiting mra and dwi spine, spinal angio  -refused LP today, will readdress tomorrow  -send serum NMO  -pt/ot, diet, oob as able    GI  Colitis  Bl upper quadrant abdominal pain and CT abdomen pelvis revealed colitis. She was started on cipro and flagyl. Has not had any bowel movements since 9/18 despite aggressive bowel regimen at OSH.   No bowel inflammation on CT performed  today  Improved symptoms w bowel movement    -dc abx  -cont aggressive bowel reg          The patient is being Prophylaxed for:  Venous Thromboembolism with: Mechanical or Chemical  Stress Ulcer with: Not Applicable   Ventilator Pneumonia with: not applicable    Activity Orders          Diet NPO Except for: Medication: NPO starting at 09/26 0750    Turn patient starting at 09/25 1200    Elevate HOB starting at 09/25 1108        Full Code    Critical condition in that Patient has a condition that poses threat to life and bodily function: as above     38 minutes of Critical care time was spent personally by me on the following activities: development of treatment plan with patient or surrogate and bedside caregivers, discussions with consultants, evaluation of patient's response to treatment, examination of patient, ordering and performing treatments and interventions, ordering and review of laboratory studies, ordering and review of radiographic studies, pulse oximetry, antibiotic titration if applicable, vasopressor titration if applicable, re-evaluation of patient's condition. This critical care time did not overlap with that of any other provider or involve time for any procedures. There is high probability for acute neurological change leading to clinical and possibly life-threatening deterioration requiring highest level of physician preparedness for urgent intervention.      Ayaan Duncan MD  Neurocritical Care  [facility]

## 2023-09-26 NOTE — TREATMENT PLAN
CT abd/pelvis done at outside facility on 9/21 with dilation and fluid-filled colon, concern for colitis. Pt c/o abdominal pain on palpation and generalized abdominal discomfort. On exam, abdomen is tympanic and distended, but not rigid. No guarding noted on exam. Of note, pt has had no BM since 9/18 despite aggressive bowel regimen at outside facility.     Discussed with General Surgery, motility issues likely related to no rectal tone. Recommended daily suppositories and repeat non-urgent CT abd/pelvis for baseline comparison.

## 2023-09-26 NOTE — PLAN OF CARE
Recommendations     1. If able to tolerate PO intake, ADAT to regular- texture per SLP.   - If PO intake <50%, add Boost Plus BID.      2. If EN recs warranted: Isosource 1.5 @ goal rate of 40 mL/hr- provides 1440 kcals, 65 g pro, and 733 mL fluids.               - If alternative formula warranted: [employer] Standard 1.4 @ goal rate of 40 mL/hr- provides 1344 kcals, 60 g pro, and 691 mL fluid.     3. RD following.     Goals: Will meet % EEN/EPN by next RD f/u.  Nutrition Goal Status: new  Communication of RD Recs:  (POC)    Emilie Costello RDN,LDN

## 2023-09-27 PROBLEM — I21.4 NSTEMI (NON-ST ELEVATED MYOCARDIAL INFARCTION): Status: RESOLVED | Noted: 2023-09-25 | Resolved: 2023-09-27

## 2023-09-27 PROBLEM — R53.81 DEBILITY: Status: ACTIVE | Noted: 2023-09-27

## 2023-09-27 LAB
ALBUMIN SERPL BCP-MCNC: 2.2 G/DL (ref 3.5–5.2)
ALP SERPL-CCNC: 177 U/L (ref 55–135)
ALT SERPL W/O P-5'-P-CCNC: 85 U/L (ref 10–44)
ANION GAP SERPL CALC-SCNC: 6 MMOL/L (ref 8–16)
AST SERPL-CCNC: 97 U/L (ref 10–40)
BASOPHILS # BLD AUTO: 0.06 K/UL (ref 0–0.2)
BASOPHILS NFR BLD: 1 % (ref 0–1.9)
BILIRUB SERPL-MCNC: 0.5 MG/DL (ref 0.1–1)
BUN SERPL-MCNC: 13 MG/DL (ref 8–23)
CALCIUM SERPL-MCNC: 8.1 MG/DL (ref 8.7–10.5)
CHLORIDE SERPL-SCNC: 102 MMOL/L (ref 95–110)
CO2 SERPL-SCNC: 24 MMOL/L (ref 23–29)
CREAT SERPL-MCNC: 0.6 MG/DL (ref 0.5–1.4)
DIFFERENTIAL METHOD: ABNORMAL
EOSINOPHIL # BLD AUTO: 0.1 K/UL (ref 0–0.5)
EOSINOPHIL NFR BLD: 2.1 % (ref 0–8)
ERYTHROCYTE [DISTWIDTH] IN BLOOD BY AUTOMATED COUNT: 12.2 % (ref 11.5–14.5)
EST. GFR  (NO RACE VARIABLE): >60 ML/MIN/1.73 M^2
GLUCOSE SERPL-MCNC: 106 MG/DL (ref 70–110)
HCT VFR BLD AUTO: 35.7 % (ref 37–48.5)
HGB BLD-MCNC: 12.7 G/DL (ref 12–16)
IMM GRANULOCYTES # BLD AUTO: 0.16 K/UL (ref 0–0.04)
IMM GRANULOCYTES NFR BLD AUTO: 2.6 % (ref 0–0.5)
LYMPHOCYTES # BLD AUTO: 1.7 K/UL (ref 1–4.8)
LYMPHOCYTES NFR BLD: 26.9 % (ref 18–48)
MAGNESIUM SERPL-MCNC: 1.9 MG/DL (ref 1.6–2.6)
MCH RBC QN AUTO: 31.7 PG (ref 27–31)
MCHC RBC AUTO-ENTMCNC: 35.6 G/DL (ref 32–36)
MCV RBC AUTO: 89 FL (ref 82–98)
MONOCYTES # BLD AUTO: 0.9 K/UL (ref 0.3–1)
MONOCYTES NFR BLD: 13.9 % (ref 4–15)
NEUTROPHILS # BLD AUTO: 3.3 K/UL (ref 1.8–7.7)
NEUTROPHILS NFR BLD: 53.5 % (ref 38–73)
NRBC BLD-RTO: 0 /100 WBC
PHOSPHATE SERPL-MCNC: 3 MG/DL (ref 2.7–4.5)
PLATELET # BLD AUTO: 228 K/UL (ref 150–450)
PMV BLD AUTO: 9.1 FL (ref 9.2–12.9)
POTASSIUM SERPL-SCNC: 4 MMOL/L (ref 3.5–5.1)
PROT SERPL-MCNC: 5.6 G/DL (ref 6–8.4)
RBC # BLD AUTO: 4.01 M/UL (ref 4–5.4)
SODIUM SERPL-SCNC: 132 MMOL/L (ref 136–145)
T PALLIDUM AB SER QL IF: NORMAL
WBC # BLD AUTO: 6.2 K/UL (ref 3.9–12.7)

## 2023-09-27 PROCEDURE — 63600175 PHARM REV CODE 636 W HCPCS: Performed by: NURSE PRACTITIONER

## 2023-09-27 PROCEDURE — 51701 INSERT BLADDER CATHETER: CPT

## 2023-09-27 PROCEDURE — 85025 COMPLETE CBC W/AUTO DIFF WBC: CPT

## 2023-09-27 PROCEDURE — 63600175 PHARM REV CODE 636 W HCPCS: Performed by: REGISTERED NURSE

## 2023-09-27 PROCEDURE — 80053 COMPREHEN METABOLIC PANEL: CPT

## 2023-09-27 PROCEDURE — 25500020 PHARM REV CODE 255: Performed by: PSYCHIATRY & NEUROLOGY

## 2023-09-27 PROCEDURE — 51798 US URINE CAPACITY MEASURE: CPT

## 2023-09-27 PROCEDURE — 99233 SBSQ HOSP IP/OBS HIGH 50: CPT | Mod: FS,,, | Performed by: PSYCHIATRY & NEUROLOGY

## 2023-09-27 PROCEDURE — 84100 ASSAY OF PHOSPHORUS: CPT

## 2023-09-27 PROCEDURE — 25000003 PHARM REV CODE 250

## 2023-09-27 PROCEDURE — 99233 PR SUBSEQUENT HOSPITAL CARE,LEVL III: ICD-10-PCS | Mod: FS,,, | Performed by: PSYCHIATRY & NEUROLOGY

## 2023-09-27 PROCEDURE — A9585 GADOBUTROL INJECTION: HCPCS | Performed by: PSYCHIATRY & NEUROLOGY

## 2023-09-27 PROCEDURE — 83735 ASSAY OF MAGNESIUM: CPT

## 2023-09-27 PROCEDURE — 25000003 PHARM REV CODE 250: Performed by: REGISTERED NURSE

## 2023-09-27 PROCEDURE — 25000003 PHARM REV CODE 250: Performed by: PSYCHIATRY & NEUROLOGY

## 2023-09-27 PROCEDURE — 20000000 HC ICU ROOM

## 2023-09-27 PROCEDURE — 25000003 PHARM REV CODE 250: Performed by: NURSE PRACTITIONER

## 2023-09-27 RX ORDER — OXYCODONE HYDROCHLORIDE 10 MG/1
10 TABLET ORAL EVERY 4 HOURS PRN
Status: DISCONTINUED | OUTPATIENT
Start: 2023-09-27 | End: 2023-10-08

## 2023-09-27 RX ORDER — HYDROMORPHONE HYDROCHLORIDE 1 MG/ML
0.2 INJECTION, SOLUTION INTRAMUSCULAR; INTRAVENOUS; SUBCUTANEOUS ONCE
Status: COMPLETED | OUTPATIENT
Start: 2023-09-27 | End: 2023-09-27

## 2023-09-27 RX ORDER — METRONIDAZOLE 500 MG/1
500 TABLET ORAL EVERY 8 HOURS
Status: DISCONTINUED | OUTPATIENT
Start: 2023-09-27 | End: 2023-09-27

## 2023-09-27 RX ORDER — GABAPENTIN 300 MG/1
300 CAPSULE ORAL 3 TIMES DAILY
Status: DISCONTINUED | OUTPATIENT
Start: 2023-09-27 | End: 2023-09-28

## 2023-09-27 RX ORDER — DIAZEPAM 5 MG/1
5 TABLET ORAL EVERY 4 HOURS PRN
Status: DISCONTINUED | OUTPATIENT
Start: 2023-09-27 | End: 2023-10-11 | Stop reason: HOSPADM

## 2023-09-27 RX ORDER — METHOCARBAMOL 500 MG/1
500 TABLET, FILM COATED ORAL 4 TIMES DAILY PRN
Status: DISCONTINUED | OUTPATIENT
Start: 2023-09-27 | End: 2023-09-27

## 2023-09-27 RX ORDER — GADOBUTROL 604.72 MG/ML
20 INJECTION INTRAVENOUS
Status: COMPLETED | OUTPATIENT
Start: 2023-09-27 | End: 2023-09-27

## 2023-09-27 RX ORDER — HYDRALAZINE HYDROCHLORIDE 20 MG/ML
10 INJECTION INTRAMUSCULAR; INTRAVENOUS EVERY 6 HOURS PRN
Status: DISCONTINUED | OUTPATIENT
Start: 2023-09-27 | End: 2023-09-30

## 2023-09-27 RX ORDER — MORPHINE SULFATE 2 MG/ML
2 INJECTION, SOLUTION INTRAMUSCULAR; INTRAVENOUS EVERY 4 HOURS PRN
Status: DISCONTINUED | OUTPATIENT
Start: 2023-09-27 | End: 2023-09-30

## 2023-09-27 RX ADMIN — FAMOTIDINE 20 MG: 20 TABLET ORAL at 08:09

## 2023-09-27 RX ADMIN — ATORVASTATIN CALCIUM 40 MG: 40 TABLET, FILM COATED ORAL at 09:09

## 2023-09-27 RX ADMIN — GABAPENTIN 300 MG: 300 CAPSULE ORAL at 08:09

## 2023-09-27 RX ADMIN — GABAPENTIN 300 MG: 300 CAPSULE ORAL at 10:09

## 2023-09-27 RX ADMIN — GADOBUTROL 20 ML: 604.72 INJECTION INTRAVENOUS at 02:09

## 2023-09-27 RX ADMIN — MUPIROCIN: 20 OINTMENT TOPICAL at 08:09

## 2023-09-27 RX ADMIN — HYDROMORPHONE HYDROCHLORIDE 0.2 MG: 1 INJECTION, SOLUTION INTRAMUSCULAR; INTRAVENOUS; SUBCUTANEOUS at 01:09

## 2023-09-27 RX ADMIN — FAMOTIDINE 20 MG: 20 TABLET ORAL at 09:09

## 2023-09-27 RX ADMIN — CIPROFLOXACIN 500 MG: KIT at 09:09

## 2023-09-27 RX ADMIN — OXYCODONE HYDROCHLORIDE 10 MG: 10 TABLET ORAL at 05:09

## 2023-09-27 RX ADMIN — PROMETHAZINE HYDROCHLORIDE 6.25 MG: 25 INJECTION INTRAMUSCULAR; INTRAVENOUS at 12:09

## 2023-09-27 RX ADMIN — MIDAZOLAM 0.5 MG: 1 INJECTION INTRAMUSCULAR; INTRAVENOUS at 01:09

## 2023-09-27 RX ADMIN — MUPIROCIN: 20 OINTMENT TOPICAL at 09:09

## 2023-09-27 RX ADMIN — HYDRALAZINE HYDROCHLORIDE 10 MG: 20 INJECTION, SOLUTION INTRAMUSCULAR; INTRAVENOUS at 10:09

## 2023-09-27 RX ADMIN — GABAPENTIN 300 MG: 300 CAPSULE ORAL at 02:09

## 2023-09-27 NOTE — NURSING
Patient brought to MRI via bed with portable monitor and ambu bag present. Phenergan, 0.2 mg of Dilaudid, and 0.5 mg of Versed given prior to transportation. Patient tolerated scan and transportation well and is now back in 9087 on continuous monitoring.

## 2023-09-27 NOTE — PLAN OF CARE
[facility] Care Plan    POC reviewed with Rhina Forrest and family at 1400. Pt verbalized understanding. Questions and concerns addressed. No acute events today. Pt progressing toward goals. Will continue to monitor. See below and flowsheets for full assessment and VS info.     -Q4 Neurochecks  -Pt requested to hold off on AM Spinal Angiogram until 9/28/2023 AM.  -NPO @ Midnight   -Straight cath x1  -Pt refused all bowel regimen  -Pt refused Metronidazole and stated that it causes excessive salivation/drooling and is unable to keep her head up while medication is running.  - Administered Hydralazine x1 for SBP >200  -Administered 10 mg Oxycodone x1 for back pain.        Is this a stroke patient? No  Neuro:  [facility] Coma Scale  Best Eye Response: 4-->(E4) spontaneous  Best Motor Response: 6-->(M6) obeys commands  Best Verbal Response: 5-->(V5) oriented  Jerilyn Coma Scale Score: 15  Pupil PERRLA: yes     24 hr Temp:  [98 °F (36.7 °C)-99.2 °F (37.3 °C)]     CV:   Rhythm: normal sinus rhythm  BP goals:   SBP < 200  MAP > 85    Resp:           Plan: N/A    GI/:     Diet/Nutrition Received: regular  Last Bowel Movement: 09/27/23  Voiding Characteristics: external catheter    Intake/Output Summary (Last 24 hours) at 9/27/2023 1500  Last data filed at 9/27/2023 1451  Gross per 24 hour   Intake 247.92 ml   Output 1650 ml   Net -1402.08 ml     Unmeasured Output  Stool Occurrence: 1    Labs/Accuchecks:  Recent Labs   Lab 09/27/23  0045   WBC 6.20   RBC 4.01   HGB 12.7   HCT 35.7*         Recent Labs   Lab 09/27/23  0045   *   K 4.0   CO2 24      BUN 13   CREATININE 0.6   ALKPHOS 177*   ALT 85*   AST 97*   BILITOT 0.5      Recent Labs   Lab 09/25/23  1136   INR 1.1   APTT 22.7      Recent Labs   Lab 09/25/23  1453   TROPONINI 0.011       Electrolytes: No replacement orders  Accuchecks: none    Gtts:      LDA/Wounds:  Lines/Drains/Airways       Drain  Duration             Female External Urinary Catheter  09/25/23 1100 2 days         Rectal Tube 09/26/23 1603 <1 day              Peripheral Intravenous Line  Duration                  Peripheral IV - Single Lumen 09/26/23 1841 18 G Anterior;Left Forearm <1 day         Peripheral IV - Single Lumen 09/26/23 2000 20 G Anterior;Right Forearm <1 day                  Wounds: Yes  Wound care consulted: Yes

## 2023-09-27 NOTE — PLAN OF CARE
[facility] Care Plan    POC reviewed with Rhina Forrest and family at 1400. Pt verbalized understanding. Questions and concerns addressed. No acute events today. Pt progressing toward goals. Will continue to monitor. See below and flowsheets for full assessment and VS info.     -CT of Abdomen completed  -Pt requested to hold off on LP   -Liquid BM 3x, pt refused all bowel regimen   -Rectal tube  -Pending Spinal Angiogram in AM  -NPO @ Midnight          Is this a stroke patient? no    Neuro:  Jerilyn Coma Scale  Best Eye Response: 4-->(E4) spontaneous  Best Motor Response: 6-->(M6) obeys commands  Best Verbal Response: 5-->(V5) oriented  [place] Coma Scale Score: 15  Pupil PERRLA: yes     24 hr Temp:  [98 °F (36.7 °C)-99.1 °F (37.3 °C)]     CV:   Rhythm: normal sinus rhythm  BP goals:   SBP < 180  MAP > 85    Resp:           Plan: N/A    GI/:     Diet/Nutrition Received: regular  Last Bowel Movement: 09/26/23  Voiding Characteristics: external catheter    Intake/Output Summary (Last 24 hours) at 9/26/2023 2003  Last data filed at 9/26/2023 1805  Gross per 24 hour   Intake 192.04 ml   Output 2170 ml   Net -1977.96 ml     Unmeasured Output  Stool Occurrence: 1    Labs/Accuchecks:  Recent Labs   Lab 09/25/23  2242   WBC 6.85   RBC 4.17   HGB 12.9   HCT 37.2         Recent Labs   Lab 09/25/23  2242   *   K 4.8   CO2 25      BUN 18   CREATININE 0.6   ALKPHOS 212*   ALT 94*   *   BILITOT 0.6      Recent Labs   Lab 09/25/23  1136   INR 1.1   APTT 22.7      Recent Labs   Lab 09/25/23  1453   TROPONINI 0.011       Electrolytes: N/A - electrolytes WDL  Accuchecks: none    Gtts:      LDA/Wounds:  Lines/Drains/Airways       Drain  Duration             Female External Urinary Catheter 09/25/23 1100 1 day         Rectal Tube 09/26/23 1603 <1 day              Peripheral Intravenous Line  Duration                  Peripheral IV - Single Lumen 09/25/23 1100 20 G;1 1/4 in Posterior;Right Forearm 1 day          Peripheral IV - Single Lumen 09/26/23 1841 18 G Anterior;Left Forearm <1 day                  Wounds: Yes  Wound care consulted: No

## 2023-09-27 NOTE — ASSESSMENT & PLAN NOTE
66yo F PMHx GERD, HTN, smoker, depression presenting with bilateral lower extremity paresis and paresthesia that began after heavy lifting on 9/18/23. OSH MRI cervical and thoracic spine revealed stenosis and T9-L1 cord edema with mild enhancement.    Sudden onset with valsalva and evolution over 3 hours most suspicious for a vascular lesion, ddx includes demyelination and infectious processes     -q4 neuro checks, sleep holiday  -MAP goal > 85, SBP goal < 200  -permissive htn, aggressive bowel/bladder care, aggressive pulm toilet  -awaiting mra and dwi spine without acute findings  -refused LP  -refusing spinal angio today, will discuss with Neuro IR about rescheduling  -serum NMO pending  -pt/ot, diet, oob as able  -Multimodal pain regimen, adjusted

## 2023-09-27 NOTE — ASSESSMENT & PLAN NOTE
Bl upper quadrant abdominal pain and CT abdomen pelvis revealed colitis. She was started on cipro and flagyl. Has not had any bowel movements since 9/18 despite aggressive bowel regimen at OSH.   No bowel inflammation on CT performed today  Improved symptoms w bowel movement    -DC abx  -Bowel regimen stopped due to diarrhea

## 2023-09-27 NOTE — SUBJECTIVE & OBJECTIVE
Interval History: 9/27: NAEON, refused LP. Tentative spinal angio today. MRI spine complete overnight.     Medications:  Continuous Infusions:  Scheduled Meds:   atorvastatin  40 mg Oral Daily    famotidine  20 mg Oral BID    gabapentin  300 mg Oral TID    magnesium hydroxide 400 mg/5 ml  30 mL Oral Daily    mupirocin   Nasal BID     PRN Meds:diazePAM, hydrALAZINE, morphine, ondansetron, oxyCODONE, oxyCODONE     Review of Systems  Objective:     Weight: 91.6 kg (201 lb 15.1 oz)  Body mass index is 34.66 kg/m².  Vital Signs (Most Recent):  Temp: 99.2 °F (37.3 °C) (09/27/23 0705)  Pulse: 81 (09/27/23 1032)  Resp: (!) 32 (09/27/23 1032)  BP: (!) 200/86 (09/27/23 1032)  SpO2: 95 % (09/27/23 1032) Vital Signs (24h Range):  Temp:  [98 °F (36.7 °C)-99.2 °F (37.3 °C)] 99.2 °F (37.3 °C)  Pulse:  [61-85] 81  Resp:  [19-52] 32  SpO2:  [92 %-95 %] 95 %  BP: (123-216)/(61-96) 200/86     Date 09/27/23 0700 - 09/28/23 0659   Shift 1159-0785 8290-6034 1487-8900 24 Hour Total   INTAKE   IV Piggyback 28.3   28.3   Shift Total(mL/kg) 28.3(0.3)   28.3(0.3)   OUTPUT   Stool 100   100   Shift Total(mL/kg) 100(1.1)   100(1.1)   Weight (kg) 91.6 91.6 91.6 91.6                         Female External Urinary Catheter 09/25/23 1100 (Active)   Skin no redness;no breakdown 09/26/23 0705   Tolerance no signs/symptoms of discomfort 09/26/23 0705   Output (mL) 700 mL 09/25/23 2316          Physical Exam  Constitutional:       Appearance: She is well-developed and well-nourished.   Eyes:      Pupils: Pupils are equal, round, and reactive to light.   Cardiovascular:      Pulses: Normal pulses.   Abdominal:      Palpations: Abdomen is soft.   Neurological:      Mental Status: She is alert and oriented to person, place, and time.              Physical Exam:    Constitutional: She appears well-developed and well-nourished.     Eyes: Pupils are equal, round, and reactive to light.     Cardiovascular: Normal pulses.     Abdominal: Soft.  "    Psych/Behavior: She is alert. She is oriented to person, place, and time.     Musculoskeletal:        Neck: Range of motion is full.     Pulm Comf on RA      Neuro Exam: 0/5 in BLE; 5/5 in BUE; T12 sensory level; no rectal tone, no spasticity    Significant Labs:  Recent Labs   Lab 09/25/23 2242 09/27/23  0045    106   * 132*   K 4.8 4.0    102   CO2 25 24   BUN 18 13   CREATININE 0.6 0.6   CALCIUM 8.5* 8.1*   MG 2.2 1.9       Recent Labs   Lab 09/25/23 2242 09/27/23  0045   WBC 6.85 6.20   HGB 12.9 12.7   HCT 37.2 35.7*    228       No results for input(s): "LABPT", "INR", "APTT" in the last 48 hours.    Microbiology Results (last 7 days)       Procedure Component Value Units Date/Time    Urine culture [[id number]] Collected: 09/25/23 1519    Order Status: Completed Specimen: Urine Updated: 09/26/23 2006     Urine Culture, Routine No growth    Narrative:      Specimen Source->Urine    Blood culture [[id number]] Collected: 09/25/23 1450    Order Status: Completed Specimen: Blood from Peripheral, Hand, Left Updated: 09/26/23 1812     Blood Culture, Routine No Growth to date      No Growth to date    Blood culture [[id number]] Collected: 09/25/23 1449    Order Status: Completed Specimen: Blood from Peripheral, Antecubital, Right Updated: 09/26/23 1812     Blood Culture, Routine No Growth to date      No Growth to date    Culture, Respiratory with Gram Stain [[id number]]     Order Status: No result Specimen: Respiratory           All pertinent labs from the last 24 hours have been reviewed.    Significant Diagnostics:  I have reviewed all pertinent imaging results/findings within the past 24 hours.  "

## 2023-09-27 NOTE — PROGRESS NOTES
[facility] - Neuro Critical Care  Neurosurgery  Progress Note    Subjective:     History of Present Illness: Rhina Forrest is a 68yo F PMHx GERD, HTN, smoker, depression presenting with bilateral lower extremity plegia that began after heavy lifting on 9/18/23. Pt walks with a walker at baseline and lives at home alone. On 9/18 she was walking home with groceries on her walker and lifted the walker over a curb and hurt her back. Pt was able to walk home and put her groceries away. Once she sat on the couch, she was unable to get back up as her legs stopped moving. She also had decreased sensation from T12 down. Pt called 911 and was taken via EMS to [facility]. MRI cervical and thoracic spine revealed stenosis and T9-L1 cord edema with mild enhancement suspicious for evolving cord infarct vs demyelinating process per radiology report (no imaging was sent with patient). Pt was found to have NSTEMI with troponin 1.1 and was started on ASA and plavix. Pt also c/o bl upper quadrant abdominal pain and CT abdomen pelvis revealed colitis. She was started on cipro and flagyl. Pt has had a barber since 9/18 and has not had any bowel movements since 9/18 despite aggressive bowel regimen at OSH. Transferred to [facility] for neurosurgical evaluation and further workup. Pt currently c/o severe abdominal pain, n/v, severe back pain, BUE rash x 1 month, BLE plegia and T12 sensory level.       Post-Op Info:  * No surgery found *         Interval History: 9/27: NAEON, refused LP. Tentative spinal angio today. MRI spine complete overnight.     Medications:  Continuous Infusions:  Scheduled Meds:   atorvastatin  40 mg Oral Daily    famotidine  20 mg Oral BID    gabapentin  300 mg Oral TID    magnesium hydroxide 400 mg/5 ml  30 mL Oral Daily    mupirocin   Nasal BID     PRN Meds:diazePAM, hydrALAZINE, morphine, ondansetron, oxyCODONE, oxyCODONE     Review of Systems  Objective:     Weight: 91.6 kg (201 lb 15.1  oz)  Body mass index is 34.66 kg/m².  Vital Signs (Most Recent):  Temp: 99.2 °F (37.3 °C) (09/27/23 0705)  Pulse: 81 (09/27/23 1032)  Resp: (!) 32 (09/27/23 1032)  BP: (!) 200/86 (09/27/23 1032)  SpO2: 95 % (09/27/23 1032) Vital Signs (24h Range):  Temp:  [98 °F (36.7 °C)-99.2 °F (37.3 °C)] 99.2 °F (37.3 °C)  Pulse:  [61-85] 81  Resp:  [19-52] 32  SpO2:  [92 %-95 %] 95 %  BP: (123-216)/(61-96) 200/86     Date 09/27/23 0700 - 09/28/23 0659   Shift 7864-9144 7997-5440 4487-8500 24 Hour Total   INTAKE   IV Piggyback 28.3   28.3   Shift Total(mL/kg) 28.3(0.3)   28.3(0.3)   OUTPUT   Stool 100   100   Shift Total(mL/kg) 100(1.1)   100(1.1)   Weight (kg) 91.6 91.6 91.6 91.6                         Female External Urinary Catheter 09/25/23 1100 (Active)   Skin no redness;no breakdown 09/26/23 0705   Tolerance no signs/symptoms of discomfort 09/26/23 0705   Output (mL) 700 mL 09/25/23 2316         Physical Exam  Constitutional:       Appearance: She is well-developed and well-nourished.   Eyes:      Pupils: Pupils are equal, round, and reactive to light.   Cardiovascular:      Pulses: Normal pulses.   Abdominal:      Palpations: Abdomen is soft.   Neurological:      Mental Status: She is alert and oriented to person, place, and time.              Physical Exam:    Constitutional: She appears well-developed and well-nourished.     Eyes: Pupils are equal, round, and reactive to light.     Cardiovascular: Normal pulses.     Abdominal: Soft.     Psych/Behavior: She is alert. She is oriented to person, place, and time.     Musculoskeletal:        Neck: Range of motion is full.     Pulm Comf on RA      Neuro Exam: 0/5 in BLE; 5/5 in BUE; T12 sensory level; no rectal tone, no spasticity    Significant Labs:  Recent Labs   Lab 09/25/23 2242 09/27/23  0045    106   * 132*   K 4.8 4.0    102   CO2 25 24   BUN 18 13   CREATININE 0.6 0.6   CALCIUM 8.5* 8.1*   MG 2.2 1.9       Recent Labs   Lab 09/25/23 2242  "09/27/23  0045   WBC 6.85 6.20   HGB 12.9 12.7   HCT 37.2 35.7*    228       No results for input(s): "LABPT", "INR", "APTT" in the last 48 hours.    Microbiology Results (last 7 days)       Procedure Component Value Units Date/Time    Urine culture [[id number]] Collected: 09/25/23 1519    Order Status: Completed Specimen: Urine Updated: 09/26/23 2006     Urine Culture, Routine No growth    Narrative:      Specimen Source->Urine    Blood culture [[id number]] Collected: 09/25/23 1450    Order Status: Completed Specimen: Blood from Peripheral, Hand, Left Updated: 09/26/23 1812     Blood Culture, Routine No Growth to date      No Growth to date    Blood culture [[id number]] Collected: 09/25/23 1449    Order Status: Completed Specimen: Blood from Peripheral, Antecubital, Right Updated: 09/26/23 1812     Blood Culture, Routine No Growth to date      No Growth to date    Culture, Respiratory with Gram Stain [[id number]]     Order Status: No result Specimen: Respiratory           All pertinent labs from the last 24 hours have been reviewed.    Significant Diagnostics:  I have reviewed all pertinent imaging results/findings within the past 24 hours.    Assessment/Plan:     * Acute transverse myelitis  68yo F PMHx GERD, HTN, smoker, depression presenting with bilateral lower extremity plegia that began after heavy lifting on 9/18/23. OSH radiology report for MRI saying cord edema from T9-L1, no disc sent with patient.     MRI Brain w/wo 9/25: patchy periventricular white matter T2/flair hyperintensities demylination vs microvascular disease  MRI C/T/L spine 9/25: Abnormal cord edema from T9 to conus  MRI spine demyelinating/MRA: possible diffusion restriction in distal thoracic spine/conus, non diagnostic MRA    --Patient admitted to [facility]   -q1h neurochecks in ICU  --All labs and diagnostics reviewed  --Cord findings suspicious for mass vs ischemia vs demyelinating disorder  --Spinal angio to evaluate for possible cord " ischemia, keep NPO for possibly today  --Recommend LP and demyelination protocol MRI to evaluate for demyelinating disorder   -- pt reportedly refusing LP  --MAP goals >85 at this time   --Bladder scans q6h with I&O cath   --on ASA and plavix for recent NSTEMI - hold in acute setting   --NPO at this time for possible operative intervention  --Continue to monitor clinically, notify NSGY immediately with any changes in neuro status    Discussed with Dr. Cornel Vizcaino MD  Neurosurgery  [facility] - Neuro Critical Care

## 2023-09-27 NOTE — PROGRESS NOTES
[facility] - Neuro Critical Care  Neurocritical Care  Progress Note    Admit Date: 9/25/2023  Service Date: 09/27/2023  Length of Stay: 2    Subjective:     Chief Complaint: Acute transverse myelitis    History of Present Illness: Rhina Forrest is a 66yo F PMHx GERD, HTN, smoker, depression presenting with bilateral lower extremity paresis and paresthesia that began after heavy lifting on 9/18/23. Pt walks with a walker at baseline and lives at home alone. On 9/18 she was walking home with groceries on her walker and lifted the walker over a curb and hurt her back. Pt was able to walk home and put her groceries away. Once she sat on the couch, she was unable to get back up as her legs stopped moving. She also had decreased sensation from T12 down. Pt called 911 and was taken via EMS to [facility]. MRI cervical and thoracic spine revealed stenosis and T9-L1 cord edema with mild enhancement suspicious for evolving cord infarct vs demyelinating process. Pt was found to have NSTEMI with troponin 1.1 and was started on ASA and plavix. Pt also c/o bl upper quadrant abdominal pain and CT abdomen pelvis revealed colitis. She was started on cipro and flagyl. Pt has had a barber since 9/18 and has not had any bowel movements since 9/18 despite aggressive bowel regimen at OSH. Transferred to [facility] for neurosurgical evaluation and further workup. Pt currently c/o severe abdominal pain, n/v, severe back pain, BUE rash x 1 month, BLE paresis and paresthesia. Directly admitted to [facility] for higher level of care.     Hospital Course: 09/26/2023 naeon, improved abdominal pain w bowel movement, no change in exam  09/27/2023: NAEON. Antibiotics stopped. Pain control regimen adjusted. Patient refusing spinal angio today, will communicate with Neuro IR to see if she can be moved till tomorrow.     Review of Systems: Review of Systems   Cardiovascular:  Negative for chest pain and palpitations.   Gastrointestinal:  Positive  "for diarrhea. Negative for heartburn, nausea and vomiting.   Musculoskeletal:  Positive for back pain, joint pain and myalgias.   Neurological:  Positive for tingling, sensory change, focal weakness and weakness. Negative for speech change, seizures, loss of consciousness and headaches.     Vitals:   Temp: 98.5 °F (36.9 °C)  Pulse: 84  Rhythm: normal sinus rhythm  BP: 125/60  MAP (mmHg): 87  Resp: (!) 28  SpO2: (!) 94 %    Temp  Min: 98 °F (36.7 °C)  Max: 99.2 °F (37.3 °C)  Pulse  Min: 61  Max: 94  BP  Min: 123/60  Max: 220/88  MAP (mmHg)  Min: 79  Max: 138  Resp  Min: 19  Max: 36  SpO2  Min: 92 %  Max: 95 %    09/26 0701 - 09/27 0700  In: 234.6   Out: 1220 [Urine:1220]   Unmeasured Output  Stool Occurrence: 1     Examination:   Constitutional: Older than stated age. No apparent distress.   Eyes: Conjunctiva clear, anicteric. Lids no lesions.  Head/Ears/Nose/Mouth/Throat/Neck: Poor dentition. Moist mucous membranes. External ears, nose atraumatic.   Cardiovascular: Regular rhythm. No leg edema.  Respiratory: Comfortable respirations. Clear to auscultation.  Gastrointestinal: Soft, nondistended, nontender. + bowel sounds.    Neurologic:  -GCS E 4 V 5 M 6  -Alert. Oriented to person, place, and time. Speech fluent. Follows commands.  -Cranial nerves: EOM intact, PERRL, no facial droop, + cough  -Motor: BUE 5/5, BLE 0/5  -Sensation: T12 sensory level    Medications:   Continuous Scheduledatorvastatin, 40 mg, Daily  famotidine, 20 mg, BID  gabapentin, 300 mg, TID  magnesium hydroxide 400 mg/5 ml, 30 mL, Daily  mupirocin, , BID    PRNdiazePAM, 5 mg, Q4H PRN  hydrALAZINE, 10 mg, Q6H PRN  morphine, 2 mg, Q4H PRN  ondansetron, 4 mg, Q8H PRN  oxyCODONE, 5 mg, Q4H PRN  oxyCODONE, 10 mg, Q4H PRN       Today I independently reviewed pertinent medications, lines/drains/airways, imaging, cardiology results, laboratory results, microbiology results, notably:     ISTAT: No results for input(s): "PH", "PCO2", "PO2", "POCSATURATED", " ""HCO3", "BE", "POCNA", "POCK", "POCTCO2", "POCGLU", "POCICA", "POCLAC", "SAMPLE" in the last 24 hours.   Chem:   Recent Labs   Lab 09/27/23  0045   *   K 4.0      CO2 24      BUN 13   CREATININE 0.6   CALCIUM 8.1*   MG 1.9   PHOS 3.0   ANIONGAP 6*   PROT 5.6*   ALBUMIN 2.2*   BILITOT 0.5   ALKPHOS 177*   AST 97*   ALT 85*     Heme:   Recent Labs   Lab 09/27/23  0045   WBC 6.20   HGB 12.7   HCT 35.7*        Endo: No results for input(s): "POCTGLUCOSE" in the last 24 hours.     Assessment/Plan:     Neuro  * Acute transverse myelitis  66yo F PMHx GERD, HTN, smoker, depression presenting with bilateral lower extremity paresis and paresthesia that began after heavy lifting on 9/18/23. OSH MRI cervical and thoracic spine revealed stenosis and T9-L1 cord edema with mild enhancement.    Sudden onset with valsalva and evolution over 3 hours most suspicious for a vascular lesion, ddx includes demyelination and infectious processes     -q4 neuro checks, sleep holiday  -MAP goal > 85, SBP goal < 200  -permissive htn, aggressive bowel/bladder care, aggressive pulm toilet  -awaiting mra and dwi spine without acute findings  -refused LP  -refusing spinal angio today, will discuss with Neuro IR about rescheduling  -serum NMO pending  -pt/ot, diet, oob as able  -Multimodal pain regimen, adjusted    GI  Colitis  Bl upper quadrant abdominal pain and CT abdomen pelvis revealed colitis. She was started on cipro and flagyl. Has not had any bowel movements since 9/18 despite aggressive bowel regimen at OSH.   No bowel inflammation on CT performed today  Improved symptoms w bowel movement    -DC abx  -Bowel regimen stopped due to diarrhea    GERD (gastroesophageal reflux disease)  · Famotidine     The patient is being Prophylaxed for:  Venous Thromboembolism with: Mechanical or Chemical  Stress Ulcer with: Not Applicable   Ventilator Pneumonia with: not applicable    Activity Orders          Diet Adult Regular " (IDDSI Level 7) Standard Tray: Regular starting at 09/27 1156    Turn patient starting at 09/25 1200    Elevate HOB starting at 09/25 1108        Full Code     Level III    Bess Doyle NP  Neurocritical Care  [facility] - Neuro Critical Care

## 2023-09-27 NOTE — ASSESSMENT & PLAN NOTE
68yo F PMHx GERD, HTN, smoker, depression presenting with bilateral lower extremity plegia that began after heavy lifting on 9/18/23. OSH radiology report for MRI saying cord edema from T9-L1, no disc sent with patient.     MRI Brain w/wo 9/25: patchy periventricular white matter T2/flair hyperintensities demylination vs microvascular disease  MRI C/T/L spine 9/25: Abnormal cord edema from T9 to conus  MRI spine demyelinating/MRA: possible diffusion restriction in distal thoracic spine/conus, non diagnostic MRA    --Patient admitted to [facility]   -q1h neurochecks in ICU  --All labs and diagnostics reviewed  --Cord findings suspicious for mass vs ischemia vs demyelinating disorder  --Spinal angio to evaluate for possible cord ischemia, keep NPO for possibly today  --Recommend LP and demyelination protocol MRI to evaluate for demyelinating disorder   -- pt reportedly refusing LP  --MAP goals >85 at this time   --Bladder scans q6h with I&O cath   --on ASA and plavix for recent NSTEMI - hold in acute setting   --NPO at this time for possible operative intervention  --Continue to monitor clinically, notify NSGY immediately with any changes in neuro status    Discussed with Dr. Unger

## 2023-09-27 NOTE — PLAN OF CARE
[facility] Care Plan    POC reviewed with Rhina Forrest at 0300. Pt verbalized understanding. Questions and concerns addressed. No acute events overnight. Pt progressing toward goals. Will continue to monitor. See below and flowsheets for full assessment and VS info.     -MRA complete  -NPO at 0000  -Pt refuses Flagyl - see note  -Straight cath x1  -bath complete, linens changed      Is this a stroke patient? no    Neuro:  [place] Coma Scale  Best Eye Response: 4-->(E4) spontaneous  Best Motor Response: 6-->(M6) obeys commands  Best Verbal Response: 5-->(V5) oriented  [place] Coma Scale Score: 15  Pupil PERRLA: yes     24hr Temp:  [98 °F (36.7 °C)-99.1 °F (37.3 °C)]     CV:   Rhythm: normal sinus rhythm  BP goals:   SBP < 180  MAP > 85    Resp:           Plan: N/A    GI/:     Diet/Nutrition Received: regular  Last Bowel Movement: 09/26/23  Voiding Characteristics: external catheter    Intake/Output Summary (Last 24 hours) at 9/27/2023 0520  Last data filed at 9/27/2023 0301  Gross per 24 hour   Intake 313.65 ml   Output 1820 ml   Net -1506.35 ml     Unmeasured Output  Stool Occurrence: 1    Labs/Accuchecks:  Recent Labs   Lab 09/27/23  0045   WBC 6.20   RBC 4.01   HGB 12.7   HCT 35.7*         Recent Labs   Lab 09/27/23  0045   *   K 4.0   CO2 24      BUN 13   CREATININE 0.6   ALKPHOS 177*   ALT 85*   AST 97*   BILITOT 0.5      Recent Labs   Lab 09/25/23  1136   INR 1.1   APTT 22.7      Recent Labs   Lab 09/25/23  1453   TROPONINI 0.011       Electrolytes: No replacement orders  Accuchecks: none    Gtts:      LDA/Wounds:  Lines/Drains/Airways       Drain  Duration             Female External Urinary Catheter 09/25/23 1100 1 day         Rectal Tube 09/26/23 1603 <1 day              Peripheral Intravenous Line  Duration                  Peripheral IV - Single Lumen 09/26/23 1841 18 G Anterior;Left Forearm <1 day         Peripheral IV - Single Lumen 09/26/23 2000 20 G Anterior;Right Forearm <1 day                   Wounds: Yes  Wound care consulted: Yes

## 2023-09-27 NOTE — NURSING
Patient refuses 0445 dose of Metronidazole. NAYAN Knapp at bedside answering patient's questions. Patient states Metronidazole causes excessive salivation/drooling and is unable to keep her head up while medication is running. Patient's airway continues to be protected and VSS.     Patient also refuses to have blood pressure cuff on the same arm as an IV. Blood pressure cuff moved to left leg and patient educated on how a reading on the leg will be higher than if on an arm.

## 2023-09-27 NOTE — SUBJECTIVE & OBJECTIVE
Review of Systems: Review of Systems   Cardiovascular:  Negative for chest pain and palpitations.   Gastrointestinal:  Positive for diarrhea. Negative for heartburn, nausea and vomiting.   Musculoskeletal:  Positive for back pain, joint pain and myalgias.   Neurological:  Positive for tingling, sensory change, focal weakness and weakness. Negative for speech change, seizures, loss of consciousness and headaches.     Vitals:   Temp: 98.5 °F (36.9 °C)  Pulse: 84  Rhythm: normal sinus rhythm  BP: 125/60  MAP (mmHg): 87  Resp: (!) 28  SpO2: (!) 94 %    Temp  Min: 98 °F (36.7 °C)  Max: 99.2 °F (37.3 °C)  Pulse  Min: 61  Max: 94  BP  Min: 123/60  Max: 220/88  MAP (mmHg)  Min: 79  Max: 138  Resp  Min: 19  Max: 36  SpO2  Min: 92 %  Max: 95 %    09/26 0701 - 09/27 0700  In: 234.6   Out: 1220 [Urine:1220]   Unmeasured Output  Stool Occurrence: 1     Examination:   Constitutional: Older than stated age. No apparent distress.   Eyes: Conjunctiva clear, anicteric. Lids no lesions.  Head/Ears/Nose/Mouth/Throat/Neck: Poor dentition. Moist mucous membranes. External ears, nose atraumatic.   Cardiovascular: Regular rhythm. No leg edema.  Respiratory: Comfortable respirations. Clear to auscultation.  Gastrointestinal: Soft, nondistended, nontender. + bowel sounds.    Neurologic:  -GCS E 4 V 5 M 6  -Alert. Oriented to person, place, and time. Speech fluent. Follows commands.  -Cranial nerves: EOM intact, PERRL, no facial droop, + cough  -Motor: BUE 5/5, BLE 0/5  -Sensation: T12 sensory level    Medications:   Continuous Scheduledatorvastatin, 40 mg, Daily  famotidine, 20 mg, BID  gabapentin, 300 mg, TID  magnesium hydroxide 400 mg/5 ml, 30 mL, Daily  mupirocin, , BID    PRNdiazePAM, 5 mg, Q4H PRN  hydrALAZINE, 10 mg, Q6H PRN  morphine, 2 mg, Q4H PRN  ondansetron, 4 mg, Q8H PRN  oxyCODONE, 5 mg, Q4H PRN  oxyCODONE, 10 mg, Q4H PRN       Today I independently reviewed pertinent medications, lines/drains/airways, imaging, cardiology  "results, laboratory results, microbiology results, notably:     ISTAT: No results for input(s): "PH", "PCO2", "PO2", "POCSATURATED", "HCO3", "BE", "POCNA", "POCK", "POCTCO2", "POCGLU", "POCICA", "POCLAC", "SAMPLE" in the last 24 hours.   Chem:   Recent Labs   Lab 09/27/23 0045   *   K 4.0      CO2 24      BUN 13   CREATININE 0.6   CALCIUM 8.1*   MG 1.9   PHOS 3.0   ANIONGAP 6*   PROT 5.6*   ALBUMIN 2.2*   BILITOT 0.5   ALKPHOS 177*   AST 97*   ALT 85*     Heme:   Recent Labs   Lab 09/27/23 0045   WBC 6.20   HGB 12.7   HCT 35.7*        Endo: No results for input(s): "POCTGLUCOSE" in the last 24 hours.   "

## 2023-09-27 NOTE — PT/OT/SLP PROGRESS
Occupational Therapy      Patient Name:  Rhina Forrest   MRN:  [id number]    Patient not seen today secondary to hold till angiogram completed. Will follow-up as appropriate.    9/27/2023

## 2023-09-28 LAB
ALBUMIN SERPL BCP-MCNC: 2.3 G/DL (ref 3.5–5.2)
ALP SERPL-CCNC: 203 U/L (ref 55–135)
ALT SERPL W/O P-5'-P-CCNC: 77 U/L (ref 10–44)
ANION GAP SERPL CALC-SCNC: 8 MMOL/L (ref 8–16)
AST SERPL-CCNC: 76 U/L (ref 10–40)
BASOPHILS # BLD AUTO: 0.07 K/UL (ref 0–0.2)
BASOPHILS NFR BLD: 1 % (ref 0–1.9)
BILIRUB SERPL-MCNC: 0.4 MG/DL (ref 0.1–1)
BUN SERPL-MCNC: 13 MG/DL (ref 8–23)
CALCIUM SERPL-MCNC: 8.4 MG/DL (ref 8.7–10.5)
CHLORIDE SERPL-SCNC: 103 MMOL/L (ref 95–110)
CO2 SERPL-SCNC: 23 MMOL/L (ref 23–29)
CREAT SERPL-MCNC: 0.6 MG/DL (ref 0.5–1.4)
DIFFERENTIAL METHOD: ABNORMAL
EOSINOPHIL # BLD AUTO: 0.1 K/UL (ref 0–0.5)
EOSINOPHIL NFR BLD: 1.8 % (ref 0–8)
ERYTHROCYTE [DISTWIDTH] IN BLOOD BY AUTOMATED COUNT: 12.2 % (ref 11.5–14.5)
EST. GFR  (NO RACE VARIABLE): >60 ML/MIN/1.73 M^2
GLUCOSE SERPL-MCNC: 175 MG/DL (ref 70–110)
HCT VFR BLD AUTO: 39.2 % (ref 37–48.5)
HGB BLD-MCNC: 13.2 G/DL (ref 12–16)
IMM GRANULOCYTES # BLD AUTO: 0.24 K/UL (ref 0–0.04)
IMM GRANULOCYTES NFR BLD AUTO: 3.6 % (ref 0–0.5)
LYMPHOCYTES # BLD AUTO: 1.6 K/UL (ref 1–4.8)
LYMPHOCYTES NFR BLD: 23.5 % (ref 18–48)
MAGNESIUM SERPL-MCNC: 2 MG/DL (ref 1.6–2.6)
MCH RBC QN AUTO: 32 PG (ref 27–31)
MCHC RBC AUTO-ENTMCNC: 33.7 G/DL (ref 32–36)
MCV RBC AUTO: 95 FL (ref 82–98)
MONOCYTES # BLD AUTO: 0.7 K/UL (ref 0.3–1)
MONOCYTES NFR BLD: 10.8 % (ref 4–15)
NEUTROPHILS # BLD AUTO: 4 K/UL (ref 1.8–7.7)
NEUTROPHILS NFR BLD: 59.3 % (ref 38–73)
NRBC BLD-RTO: 0 /100 WBC
PHOSPHATE SERPL-MCNC: 3.5 MG/DL (ref 2.7–4.5)
PLATELET # BLD AUTO: 232 K/UL (ref 150–450)
PMV BLD AUTO: 9.6 FL (ref 9.2–12.9)
POTASSIUM SERPL-SCNC: 3.9 MMOL/L (ref 3.5–5.1)
PROT SERPL-MCNC: 5.7 G/DL (ref 6–8.4)
RBC # BLD AUTO: 4.12 M/UL (ref 4–5.4)
SODIUM SERPL-SCNC: 134 MMOL/L (ref 136–145)
WBC # BLD AUTO: 6.76 K/UL (ref 3.9–12.7)

## 2023-09-28 PROCEDURE — 85025 COMPLETE CBC W/AUTO DIFF WBC: CPT

## 2023-09-28 PROCEDURE — 80053 COMPREHEN METABOLIC PANEL: CPT

## 2023-09-28 PROCEDURE — 25000003 PHARM REV CODE 250: Performed by: NURSE PRACTITIONER

## 2023-09-28 PROCEDURE — 20000000 HC ICU ROOM

## 2023-09-28 PROCEDURE — 94761 N-INVAS EAR/PLS OXIMETRY MLT: CPT

## 2023-09-28 PROCEDURE — 99233 PR SUBSEQUENT HOSPITAL CARE,LEVL III: ICD-10-PCS | Mod: ,,, | Performed by: PSYCHIATRY & NEUROLOGY

## 2023-09-28 PROCEDURE — 25000003 PHARM REV CODE 250

## 2023-09-28 PROCEDURE — 83735 ASSAY OF MAGNESIUM: CPT

## 2023-09-28 PROCEDURE — 51701 INSERT BLADDER CATHETER: CPT

## 2023-09-28 PROCEDURE — 99233 SBSQ HOSP IP/OBS HIGH 50: CPT | Mod: ,,, | Performed by: PSYCHIATRY & NEUROLOGY

## 2023-09-28 PROCEDURE — 51798 US URINE CAPACITY MEASURE: CPT

## 2023-09-28 PROCEDURE — 84100 ASSAY OF PHOSPHORUS: CPT

## 2023-09-28 RX ORDER — SIMETHICONE 80 MG
1 TABLET,CHEWABLE ORAL 3 TIMES DAILY PRN
Status: DISCONTINUED | OUTPATIENT
Start: 2023-09-28 | End: 2023-10-11 | Stop reason: HOSPADM

## 2023-09-28 RX ORDER — GABAPENTIN 300 MG/1
600 CAPSULE ORAL 3 TIMES DAILY
Status: DISCONTINUED | OUTPATIENT
Start: 2023-09-28 | End: 2023-09-28

## 2023-09-28 RX ORDER — GABAPENTIN 400 MG/1
800 CAPSULE ORAL 3 TIMES DAILY
Status: DISCONTINUED | OUTPATIENT
Start: 2023-09-28 | End: 2023-10-11 | Stop reason: HOSPADM

## 2023-09-28 RX ADMIN — OXYCODONE HYDROCHLORIDE 10 MG: 10 TABLET ORAL at 01:09

## 2023-09-28 RX ADMIN — MUPIROCIN: 20 OINTMENT TOPICAL at 01:09

## 2023-09-28 RX ADMIN — DIAZEPAM 5 MG: 5 TABLET ORAL at 06:09

## 2023-09-28 RX ADMIN — OXYCODONE HYDROCHLORIDE 10 MG: 10 TABLET ORAL at 08:09

## 2023-09-28 RX ADMIN — MUPIROCIN: 20 OINTMENT TOPICAL at 08:09

## 2023-09-28 RX ADMIN — FAMOTIDINE 20 MG: 20 TABLET ORAL at 08:09

## 2023-09-28 RX ADMIN — GABAPENTIN 300 MG: 300 CAPSULE ORAL at 08:09

## 2023-09-28 RX ADMIN — GABAPENTIN 800 MG: 400 CAPSULE ORAL at 03:09

## 2023-09-28 RX ADMIN — GABAPENTIN 800 MG: 400 CAPSULE ORAL at 08:09

## 2023-09-28 NOTE — PROGRESS NOTES
[facility] - Neuro Critical Care  Neurosurgery  Progress Note    Subjective:     History of Present Illness: Rhina Forrest is a 66yo F PMHx GERD, HTN, smoker, depression presenting with bilateral lower extremity plegia that began after heavy lifting on 9/18/23. Pt walks with a walker at baseline and lives at home alone. On 9/18 she was walking home with groceries on her walker and lifted the walker over a curb and hurt her back. Pt was able to walk home and put her groceries away. Once she sat on the couch, she was unable to get back up as her legs stopped moving. She also had decreased sensation from T12 down. Pt called 911 and was taken via EMS to [facility]. MRI cervical and thoracic spine revealed stenosis and T9-L1 cord edema with mild enhancement suspicious for evolving cord infarct vs demyelinating process per radiology report (no imaging was sent with patient). Pt was found to have NSTEMI with troponin 1.1 and was started on ASA and plavix. Pt also c/o bl upper quadrant abdominal pain and CT abdomen pelvis revealed colitis. She was started on cipro and flagyl. Pt has had a barber since 9/18 and has not had any bowel movements since 9/18 despite aggressive bowel regimen at OSH. Transfer[place] [facility] [place] and further workup. Pt currently c/o severe abdominal pain, n/v, severe back pain, BUE rash x 1 month, BLE plegia and T12 sensory level.       Post-Op Info:  * No surgery found *         Interval History: 9/28: NAEON, exam stable, pending spinal angio    Medications:  Continuous Infusions:  Scheduled Meds:   atorvastatin  40 mg Oral Daily    famotidine  20 mg Oral BID    gabapentin  800 mg Oral TID    mupirocin   Nasal BID     PRN Meds:diazePAM, hydrALAZINE, morphine, ondansetron, oxyCODONE, oxyCODONE, simethicone     Review of Systems  Objective:     Weight: 91.6 kg (201 lb 15.1 oz)  Body mass index is 34.66 kg/m².  Vital Signs (Most Recent):  Temp: 99.1 °F (37.3  "°C) (09/28/23 0701)  Pulse: 78 (09/28/23 1030)  Resp: (!) 29 (09/28/23 1030)  BP: (!) 141/70 (09/28/23 1000)  SpO2: 95 % (09/28/23 1030) Vital Signs (24h Range):  Temp:  [98.4 °F (36.9 °C)-99.1 °F (37.3 °C)] 99.1 °F (37.3 °C)  Pulse:  [66-94] 78  Resp:  [18-36] 29  SpO2:  [92 %-96 %] 95 %  BP: (115-220)/(56-88) 141/70                           Female External Urinary Catheter 09/25/23 1100 (Active)   Skin no redness;no breakdown 09/26/23 0705   Tolerance no signs/symptoms of discomfort 09/26/23 0705   Output (mL) 700 mL 09/25/23 2316         Physical Exam  Constitutional:       Appearance: She is well-developed and well-nourished.   Eyes:      Pupils: Pupils are equal, round, and reactive to light.   Cardiovascular:      Pulses: Normal pulses.   Abdominal:      Palpations: Abdomen is soft.   Neurological:      Mental Status: She is alert and oriented to person, place, and time.              Physical Exam:    Constitutional: She appears well-developed and well-nourished.     Eyes: Pupils are equal, round, and reactive to light.     Cardiovascular: Normal pulses.     Abdominal: Soft.     Psych/Behavior: She is alert. She is oriented to person, place, and time.     Musculoskeletal:        Neck: Range of motion is full.     Pulm Comf on RA      Neuro Exam: 0/5 in BLE; 5/5 in BUE; Patchy T12 sensory level; no rectal tone, no spasticity    Significant Labs:  Recent Labs   Lab 09/27/23  0045 09/28/23  0131    175*   * 134*   K 4.0 3.9    103   CO2 24 23   BUN 13 13   CREATININE 0.6 0.6   CALCIUM 8.1* 8.4*   MG 1.9 2.0       Recent Labs   Lab 09/27/23  0045 09/28/23  0131   WBC 6.20 6.76   HGB 12.7 13.2   HCT 35.7* 39.2    232       No results for input(s): "LABPT", "INR", "APTT" in the last 48 hours.    Microbiology Results (last 7 days)       Procedure Component Value Units Date/Time    Blood culture [[id number]] Collected: 09/25/23 1450    Order Status: Completed Specimen: Blood from Peripheral, " Hand, Left Updated: 09/27/23 1812     Blood Culture, Routine No Growth to date      No Growth to date      No Growth to date    Blood culture [[id number]] Collected: 09/25/23 1449    Order Status: Completed Specimen: Blood from Peripheral, Antecubital, Right Updated: 09/27/23 1812     Blood Culture, Routine No Growth to date      No Growth to date      No Growth to date    Urine culture [[id number]] Collected: 09/25/23 1519    Order Status: Completed Specimen: Urine Updated: 09/26/23 2006     Urine Culture, Routine No growth    Narrative:      Specimen Source->Urine    Culture, Respiratory with Gram Stain [[id number]]     Order Status: No result Specimen: Respiratory           All pertinent labs from the last 24 hours have been reviewed.    Significant Diagnostics:  I have reviewed all pertinent imaging results/findings within the past 24 hours.    Assessment/Plan:     * Acute transverse myelitis  68yo F PMHx GERD, HTN, smoker, depression presenting with bilateral lower extremity plegia that began after heavy lifting on 9/18/23. OSH radiology report for MRI saying cord edema from T9-L1, no disc sent with patient.     MRI Brain w/wo 9/25: patchy periventricular white matter T2/flair hyperintensities demylination vs microvascular disease  MRI C/T/L spine 9/25: Abnormal cord edema from T9 to conus  MRI spine demyelinating/MRA: possible diffusion restriction in distal thoracic spine/conus, non diagnostic MRA    --Patient admitted to [facility]   -q1h neurochecks in ICU  --All labs and diagnostics reviewed  --Cord findings suspicious for mass vs ischemia vs demyelinating disorder  --Spinal angio to evaluate for possible cord ischemia, keep NPO for possibly today  --Recommend LP and demyelination protocol MRI to evaluate for demyelinating disorder   -- pt reportedly refusing LP  --MAP goals >85 at this time   --Bladder scans q6h with I&O cath   --on ASA and plavix for recent NSTEMI - hold in acute setting   --NPO at this time  for possible operative intervention  --Continue to monitor clinically, notify NSGY immediately with any changes in neuro status    Discussed with Dr. Cornel Vizcaino MD  Neurosurgery  [facility] - Neuro Critical Care

## 2023-09-28 NOTE — ASSESSMENT & PLAN NOTE
68yo F PMHx GERD, HTN, smoker, depression presenting with bilateral lower extremity paresis and paresthesia that began after heavy lifting on 9/18/23. OSH MRI cervical and thoracic spine revealed stenosis and T9-L1 central cord edema with mild enhancement. MRA spine w DWI inconclusive    Sudden onset with valsalva and evolution over 3 hours most suspicious for a vascular lesion, ddx includes demyelination and infectious processes     -q4 neuro checks, sleep holiday  -permissive htn, aggressive bowel/bladder care, aggressive pulm toilet  -multimodal pain control  -awaiting spinal angio and LP  -serum NMO pending  -pt/ot, diet, oob as able

## 2023-09-28 NOTE — SUBJECTIVE & OBJECTIVE
Objective:     Vitals:  Temp: 99.2 °F (37.3 °C)  Pulse: 72  Rhythm: normal sinus rhythm  BP: (!) 163/75  MAP (mmHg): 108  Resp: (!) 26  SpO2: 95 %    Temp  Min: 98.4 °F (36.9 °C)  Max: 99.2 °F (37.3 °C)  Pulse  Min: 66  Max: 88  BP  Min: 115/56  Max: 163/75  MAP (mmHg)  Min: 80  Max: 110  Resp  Min: 18  Max: 35  SpO2  Min: 91 %  Max: 96 %    09/27 0701 - 09/28 0700  In: 278.3 [P.O.:250]  Out: 1800 [Urine:1600]   Unmeasured Output  Stool Occurrence: 1        Physical Exam  Constitutional: Older than stated age. No apparent distress.   Eyes: Conjunctiva clear, anicteric. Lids no lesions.  Head/Ears/Nose/Mouth/Throat/Neck: Poor dentition. Moist mucous membranes. External ears, nose atraumatic.   Cardiovascular: Regular rhythm. No leg edema.  Respiratory: Comfortable respirations. Clear to auscultation.  Gastrointestinal: Soft, nondistended, nontender. + bowel sounds.     Neurologic:  -GCS E 4 V 5 M 6  -Alert. Oriented to person, place, and time. Speech fluent. Follows commands.  -Cranial nerves: EOM intact, PERRL, no facial droop, + cough  -Motor: BUE 5/5, BLE 0/5  -Sensation: T12 sensory level  -absent DTR in knee and ankles       Medications:  Continuous Scheduledatorvastatin, 40 mg, Daily  famotidine, 20 mg, BID  gabapentin, 800 mg, TID  mupirocin, , BID    PRNdiazePAM, 5 mg, Q4H PRN  hydrALAZINE, 10 mg, Q6H PRN  morphine, 2 mg, Q4H PRN  ondansetron, 4 mg, Q8H PRN  oxyCODONE, 5 mg, Q4H PRN  oxyCODONE, 10 mg, Q4H PRN  simethicone, 1 tablet, TID PRN

## 2023-09-28 NOTE — PT/OT/SLP PROGRESS
Occupational Therapy      Patient Name:  Rhina Forrest   MRN:  [id number]    Patient not seen today secondary to OT eval hold till post spinal angio. Will follow-up as appropriate.    9/28/2023

## 2023-09-28 NOTE — PROGRESS NOTES
[facility] - Neuro Critical Care  Neurocritical Care  Progress Note    Admit Date: 9/25/2023  Service Date: 09/28/2023  Length of Stay: 3    Subjective:     Chief Complaint: Acute transverse myelitis    History of Present Illness: Rhina Forrest is a 66yo F PMHx GERD, HTN, smoker, depression presenting with bilateral lower extremity paresis and paresthesia that began after heavy lifting on 9/18/23. Pt walks with a walker at baseline and lives at home alone. On 9/18 she was walking home with groceries on her walker and lifted the walker over a curb and hurt her back. Pt was able to walk home and put her groceries away. Once she sat on the couch, she was unable to get back up as her legs stopped moving. She also had decreased sensation from T12 down. Pt called 911 and was taken via EMS to [facility]. MRI cervical and thoracic spine revealed stenosis and T9-L1 cord edema with mild enhancement suspicious for evolving cord infarct vs demyelinating process. Pt was found to have NSTEMI with troponin 1.1 and was started on ASA and plavix. Pt also c/o bl upper quadrant abdominal pain and CT abdomen pelvis revealed colitis. She was started on cipro and flagyl. Pt has had a barber since 9/18 and has not had any bowel movements since 9/18 despite aggressive bowel regimen at OSH. Transferred to [facility] for neurosurgical evaluation and further workup. Pt currently c/o severe abdominal pain, n/v, severe back pain, BUE rash x 1 month, BLE paresis and paresthesia. Directly admitted to [facility] for higher level of care.       Hospital Course: 09/26/2023 naeon, improved abdominal pain w bowel movement, no change in exam  09/27/2023: NAEON. Antibiotics stopped. Pain control regimen adjusted. Patient refusing spinal angio today, will communicate with Neuro IR to see if she can be moved till tomorrow.   09/28/2023 continued pain control issues, awaiting further diagnostic davis          Objective:     Vitals:  Temp: 99.2 °F (37.3  °C)  Pulse: 72  Rhythm: normal sinus rhythm  BP: (!) 163/75  MAP (mmHg): 108  Resp: (!) 26  SpO2: 95 %    Temp  Min: 98.4 °F (36.9 °C)  Max: 99.2 °F (37.3 °C)  Pulse  Min: 66  Max: 88  BP  Min: 115/56  Max: 163/75  MAP (mmHg)  Min: 80  Max: 110  Resp  Min: 18  Max: 35  SpO2  Min: 91 %  Max: 96 %    09/27 0701 - 09/28 0700  In: 278.3 [P.O.:250]  Out: 1800 [Urine:1600]   Unmeasured Output  Stool Occurrence: 1        Physical Exam  Constitutional: Older than stated age. No apparent distress.   Eyes: Conjunctiva clear, anicteric. Lids no lesions.  Head/Ears/Nose/Mouth/Throat/Neck: Poor dentition. Moist mucous membranes. External ears, nose atraumatic.   Cardiovascular: Regular rhythm. No leg edema.  Respiratory: Comfortable respirations. Clear to auscultation.  Gastrointestinal: Soft, nondistended, nontender. + bowel sounds.     Neurologic:  -GCS E 4 V 5 M 6  -Alert. Oriented to person, place, and time. Speech fluent. Follows commands.  -Cranial nerves: EOM intact, PERRL, no facial droop, + cough  -Motor: BUE 5/5, BLE 0/5  -Sensation: T12 sensory level  -absent DTR in knee and ankles       Medications:  Continuous Scheduledatorvastatin, 40 mg, Daily  famotidine, 20 mg, BID  gabapentin, 800 mg, TID  mupirocin, , BID    PRNdiazePAM, 5 mg, Q4H PRN  hydrALAZINE, 10 mg, Q6H PRN  morphine, 2 mg, Q4H PRN  ondansetron, 4 mg, Q8H PRN  oxyCODONE, 5 mg, Q4H PRN  oxyCODONE, 10 mg, Q4H PRN  simethicone, 1 tablet, TID PRN        Assessment/Plan:     Neuro  * Acute transverse myelitis  66yo F PMHx GERD, HTN, smoker, depression presenting with bilateral lower extremity paresis and paresthesia that began after heavy lifting on 9/18/23. OSH MRI cervical and thoracic spine revealed stenosis and T9-L1 central cord edema with mild enhancement. MRA spine w DWI inconclusive    Sudden onset with valsalva and evolution over 3 hours most suspicious for a vascular lesion, ddx includes demyelination and infectious processes     -q4 neuro checks,  sleep holiday  -permissive htn, aggressive bowel/bladder care, aggressive pulm toilet  -multimodal pain control  -awaiting spinal angio and LP  -serum NMO pending  -pt/ot, diet, oob as able          The patient is being Prophylaxed for:  Venous Thromboembolism with: Mechanical  Stress Ulcer with: Not Applicable   Ventilator Pneumonia with: not applicable    Activity Orders          Diet NPO Except for: Medication: NPO starting at 09/29 0001    Diet Adult Regular (IDDSI Level 7) Standard Tray: Regular starting at 09/27 1156    Turn patient starting at 09/25 1200    Elevate HOB starting at 09/25 1108        Full Code    Ayaan Duncan MD  Neurocritical Care  [facility] - Neuro Critical Care

## 2023-09-28 NOTE — ASSESSMENT & PLAN NOTE
68yo F PMHx GERD, HTN, smoker, depression presenting with bilateral lower extremity plegia that began after heavy lifting on 9/18/23. OSH radiology report for MRI saying cord edema from T9-L1, no disc sent with patient.     MRI Brain w/wo 9/25: patchy periventricular white matter T2/flair hyperintensities demylination vs microvascular disease  MRI C/T/L spine 9/25: Abnormal cord edema from T9 to conus  MRI spine demyelinating/MRA: possible diffusion restriction in distal thoracic spine/conus, non diagnostic MRA    --Patient admitted to [facility]   -q1h neurochecks in ICU  --All labs and diagnostics reviewed  --Cord findings suspicious for mass vs ischemia vs demyelinating disorder  --Spinal angio to evaluate for possible cord ischemia, keep NPO for possibly today  --Recommend LP and demyelination protocol MRI to evaluate for demyelinating disorder   -- pt reportedly refusing LP  --MAP goals >85 at this time   --Bladder scans q6h with I&O cath   --on ASA and plavix for recent NSTEMI - hold in acute setting   --NPO at this time for possible operative intervention  --Continue to monitor clinically, notify NSGY immediately with any changes in neuro status    Discussed with Dr. Unger

## 2023-09-28 NOTE — CONSULTS
[facility]  Wound Care    Patient Name:  Rhina Forrest   MRN:  [id number]  Date: 9/28/2023  Diagnosis: Acute transverse myelitis    History:     Past Medical History:   Diagnosis Date    Abnormal heart rhythm     Acid reflux     Bile duct perforation     Diverticular disease of large intestine without perforation or abscess     Essential (primary) hypertension     Fatigue     Gastritis     GERD (gastroesophageal reflux disease)     Hepatic dysfunction        Social History     Socioeconomic History    Marital status:      Social Determinants of Health     Financial Resource Strain: Unknown (9/26/2023)    Overall Financial Resource Strain (CARDIA)     Difficulty of Paying Living Expenses: Patient refused   Food Insecurity: Unknown (9/26/2023)    Hunger Vital Sign     Worried About Running Out of Food in the Last Year: Patient refused     Ran Out of Food in the Last Year: Patient refused   Transportation Needs: Unknown (9/26/2023)    PRAPARE - Transportation     Lack of Transportation (Medical): Patient refused     Lack of Transportation (Non-Medical): Patient refused   Physical Activity: Unknown (9/26/2023)    Exercise Vital Sign     Days of Exercise per Week: Patient refused     Minutes of Exercise per Session: Patient refused   Stress: Unknown (9/26/2023)    [employer] [place] [employer] - Occupational Stress Questionnaire     Feeling of Stress : Patient refused   Social Connections: Unknown (9/26/2023)    Social Connection and Isolation Panel [NHANES]     Frequency of Communication with Friends and Family: Patient refused     Frequency of Social Gatherings with Friends and Family: Patient refused     Attends [employer]ist Services: Patient refused     Active Member of Clubs or Organizations: Patient refused     Attends Club or Organization Meetings: Patient refused     Marital Status: Patient refused   Housing Stability: Unknown (9/26/2023)    Housing Stability Vital Sign      Unable to Pay for Housing in the Last Year: Patient refused     Unstable Housing in the Last Year: Patient refused       Precautions:     Allergies as of 09/24/2023    (Not on File)       [facility] Assessment Details/Treatment     Patient seen for wound care consultation for sacrum.   Reviewed chart for this encounter.   See Flow Sheet for findings.    Pt found lying in bed, agreeable to care at this time. Pt turned into lateral position for assessment of sacrum. Sacrum intact at this time, pt does have small, scattered areas of partial thickness skin loss to her perirectal region. Surrounding skin is moist and macerated. Gently cleansed w/ no rinse bath wipes, Triad applied. Pt returned to supine position and repositioned w/ purple wedge use for pressure offloading.    RECOMMENDATIONS:  BID/PRN - perirectal region - cleanse gently w/ soap and water or baby wipes and pat dry, apply thick layer of Triad.  Q2hr turns  Discussed POC with patient and primary nurse.   See EMR for orders & patient education.    Nursing to continue care & monitoring.  Nursing to maintain pressure injury prevention interventions.       09/28/23 1217   WOCN Assessment   WOCN Total Time (mins) 30   Visit Date 09/28/23   Visit Time 1217   Consult Type New   WOCN Speciality Wound   Intervention assessed;applied;changed;chart review;coordination of care;orders   Teaching on-going        Altered Skin Integrity 09/28/23 1217 Perirectal Moisture associated dermatitis Partial thickness tissue loss. Shallow open ulcer with a red or pink wound bed, without slough. Intact or Open/Ruptured Serum-filled blister.   Date First Assessed/Time First Assessed: 09/28/23 1217   Altered Skin Integrity Present on Admission - Did Patient arrive to the hospital with altered skin?: yes  Location: Perirectal  Primary Wound Type: Moisture associated dermatitis  Description of...   Wound Image    Description of Altered Skin Integrity Partial thickness tissue loss. Shallow  open ulcer with a red or pink wound bed, without slough. Intact or Open/Ruptured Serum-filled blister.   Dressing Appearance Open to air   Drainage Amount None   Drainage Characteristics/Odor No odor   Appearance Pink;Red;Moist   Tissue loss description Partial thickness   Periwound Area Moist;Macerated;Pink   Wound Edges Irregular   Care Cleansed with:;Soap and water;Applied:;Skin Barrier

## 2023-09-28 NOTE — PLAN OF CARE
[facility] Care Plan    POC reviewed with Rhina Forrest and family at 0300. Pt verbalized understanding. Questions and concerns addressed. No acute events overnight. Pt progressing toward goals. Will continue to monitor. See below and flowsheets for full assessment and VS info.           Is this a stroke patient? no    Neuro:  [place] Coma Scale  Best Eye Response: 4-->(E4) spontaneous  Best Motor Response: 6-->(M6) obeys commands  Best Verbal Response: 5-->(V5) oriented  [place] Coma Scale Score: 15  Pupil PERRLA: yes     24hr Temp:  [98.4 °F (36.9 °C)-99.2 °F (37.3 °C)]     CV:   Rhythm: normal sinus rhythm  BP goals:   SBP < 220  MAP > 65    Resp:           Plan: N/A    GI/:     Diet/Nutrition Received: regular  Last Bowel Movement: 09/27/23  Voiding Characteristics: external catheter    Intake/Output Summary (Last 24 hours) at 9/28/2023 0522  Last data filed at 9/27/2023 1451  Gross per 24 hour   Intake 278.33 ml   Output 650 ml   Net -371.67 ml     Unmeasured Output  Stool Occurrence: 1    Labs/Accuchecks:  Recent Labs   Lab 09/28/23  0131   WBC 6.76   RBC 4.12   HGB 13.2   HCT 39.2         Recent Labs   Lab 09/28/23  0131   *   K 3.9   CO2 23      BUN 13   CREATININE 0.6   ALKPHOS 203*   ALT 77*   AST 76*   BILITOT 0.4      Recent Labs   Lab 09/25/23  1136   INR 1.1   APTT 22.7      Recent Labs   Lab 09/25/23  1453   TROPONINI 0.011       Electrolytes: N/A - electrolytes WDL  Accuchecks: none    Gtts:      LDA/Wounds:  Lines/Drains/Airways       Drain  Duration             Female External Urinary Catheter 09/25/23 1100 2 days         Rectal Tube 09/26/23 1603 1 day              Peripheral Intravenous Line  Duration                  Peripheral IV - Single Lumen 09/26/23 1841 18 G Anterior;Left Forearm 1 day         Peripheral IV - Single Lumen 09/26/23 2000 20 G Anterior;Right Forearm 1 day                  Wounds: No  Wound care consulted: No

## 2023-09-28 NOTE — SUBJECTIVE & OBJECTIVE
Interval History: 9/28: NAEON, exam stable, pending spinal angio    Medications:  Continuous Infusions:  Scheduled Meds:   atorvastatin  40 mg Oral Daily    famotidine  20 mg Oral BID    gabapentin  800 mg Oral TID    mupirocin   Nasal BID     PRN Meds:diazePAM, hydrALAZINE, morphine, ondansetron, oxyCODONE, oxyCODONE, simethicone     Review of Systems  Objective:     Weight: 91.6 kg (201 lb 15.1 oz)  Body mass index is 34.66 kg/m².  Vital Signs (Most Recent):  Temp: 99.1 °F (37.3 °C) (09/28/23 0701)  Pulse: 78 (09/28/23 1030)  Resp: (!) 29 (09/28/23 1030)  BP: (!) 141/70 (09/28/23 1000)  SpO2: 95 % (09/28/23 1030) Vital Signs (24h Range):  Temp:  [98.4 °F (36.9 °C)-99.1 °F (37.3 °C)] 99.1 °F (37.3 °C)  Pulse:  [66-94] 78  Resp:  [18-36] 29  SpO2:  [92 %-96 %] 95 %  BP: (115-220)/(56-88) 141/70                           Female External Urinary Catheter 09/25/23 1100 (Active)   Skin no redness;no breakdown 09/26/23 0705   Tolerance no signs/symptoms of discomfort 09/26/23 0705   Output (mL) 700 mL 09/25/23 2316          Physical Exam  Constitutional:       Appearance: She is well-developed and well-nourished.   Eyes:      Pupils: Pupils are equal, round, and reactive to light.   Cardiovascular:      Pulses: Normal pulses.   Abdominal:      Palpations: Abdomen is soft.   Neurological:      Mental Status: She is alert and oriented to person, place, and time.              Physical Exam:    Constitutional: She appears well-developed and well-nourished.     Eyes: Pupils are equal, round, and reactive to light.     Cardiovascular: Normal pulses.     Abdominal: Soft.     Psych/Behavior: She is alert. She is oriented to person, place, and time.     Musculoskeletal:        Neck: Range of motion is full.     Pulm Comf on RA      Neuro Exam: 0/5 in BLE; 5/5 in BUE; Patchy T12 sensory level; no rectal tone, no spasticity    Significant Labs:  Recent Labs   Lab 09/27/23  0045 09/28/23  0131    175*   * 134*   K 4.0  "3.9    103   CO2 24 23   BUN 13 13   CREATININE 0.6 0.6   CALCIUM 8.1* 8.4*   MG 1.9 2.0       Recent Labs   Lab 09/27/23  0045 09/28/23  0131   WBC 6.20 6.76   HGB 12.7 13.2   HCT 35.7* 39.2    232       No results for input(s): "LABPT", "INR", "APTT" in the last 48 hours.    Microbiology Results (last 7 days)       Procedure Component Value Units Date/Time    Blood culture [[id number]] Collected: 09/25/23 1450    Order Status: Completed Specimen: Blood from Peripheral, Hand, Left Updated: 09/27/23 1812     Blood Culture, Routine No Growth to date      No Growth to date      No Growth to date    Blood culture [[id number]] Collected: 09/25/23 1449    Order Status: Completed Specimen: Blood from Peripheral, Antecubital, Right Updated: 09/27/23 1812     Blood Culture, Routine No Growth to date      No Growth to date      No Growth to date    Urine culture [[id number]] Collected: 09/25/23 1519    Order Status: Completed Specimen: Urine Updated: 09/26/23 2006     Urine Culture, Routine No growth    Narrative:      Specimen Source->Urine    Culture, Respiratory with Gram Stain [[id number]]     Order Status: No result Specimen: Respiratory           All pertinent labs from the last 24 hours have been reviewed.    Significant Diagnostics:  I have reviewed all pertinent imaging results/findings within the past 24 hours.  "

## 2023-09-28 NOTE — PLAN OF CARE
[facility] Care Plan    POC reviewed with Rhina Forrest and family at 1400. Pt verbalized understanding. Questions and concerns addressed. No acute events today. Pt progressing toward goals. Will continue to monitor. See below and flowsheets for full assessment and VS info. Angio delayed again, NPO after MN tonight.             Is this a stroke patient? no    Neuro:  [place] Coma Scale  Best Eye Response: 4-->(E4) spontaneous  Best Motor Response: 6-->(M6) obeys commands  Best Verbal Response: 5-->(V5) oriented  Jerilyn Coma Scale Score: 15  Assessment Qualifiers: patient not sedated/intubated  Pupil PERRLA: yes     24 hr Temp:  [98.4 °F (36.9 °C)-99.2 °F (37.3 °C)]     CV:   Rhythm: normal sinus rhythm  BP goals:   SBP <  200  MAP > 65    Resp:           Plan: N/A    GI/:     Diet/Nutrition Received: NPO  Last Bowel Movement: 09/28/23  Voiding Characteristics: due to void    Intake/Output Summary (Last 24 hours) at 9/28/2023 1846  Last data filed at 9/28/2023 0522  Gross per 24 hour   Intake --   Output 1150 ml   Net -1150 ml     Unmeasured Output  Stool Occurrence: 1    Labs/Accuchecks:  Recent Labs   Lab 09/28/23  0131   WBC 6.76   RBC 4.12   HGB 13.2   HCT 39.2         Recent Labs   Lab 09/28/23  0131   *   K 3.9   CO2 23      BUN 13   CREATININE 0.6   ALKPHOS 203*   ALT 77*   AST 76*   BILITOT 0.4      Recent Labs   Lab 09/25/23  1136   INR 1.1   APTT 22.7      Recent Labs   Lab 09/25/23  1453   TROPONINI 0.011       Electrolytes: N/A - electrolytes WDL  Accuchecks: none    Gtts:      LDA/Wounds:  Lines/Drains/Airways       Drain  Duration             Female External Urinary Catheter 09/25/23 1100 3 days         Rectal Tube 09/26/23 1603 2 days              Peripheral Intravenous Line  Duration                  Peripheral IV - Single Lumen 09/26/23 1841 18 G Anterior;Left Forearm 2 days         Peripheral IV - Single Lumen 09/26/23 2000 20 G Anterior;Right Forearm 1 day                   Wounds: Yes  Wound care consulted: Yes

## 2023-09-29 LAB
ALBUMIN SERPL BCP-MCNC: 2.2 G/DL (ref 3.5–5.2)
ALP SERPL-CCNC: 173 U/L (ref 55–135)
ALT SERPL W/O P-5'-P-CCNC: 76 U/L (ref 10–44)
ANION GAP SERPL CALC-SCNC: 6 MMOL/L (ref 8–16)
AST SERPL-CCNC: 69 U/L (ref 10–40)
BASOPHILS # BLD AUTO: 0.04 K/UL (ref 0–0.2)
BASOPHILS NFR BLD: 0.6 % (ref 0–1.9)
BILIRUB SERPL-MCNC: 0.4 MG/DL (ref 0.1–1)
BUN SERPL-MCNC: 12 MG/DL (ref 8–23)
CALCIUM SERPL-MCNC: 8.3 MG/DL (ref 8.7–10.5)
CHLORIDE SERPL-SCNC: 104 MMOL/L (ref 95–110)
CO2 SERPL-SCNC: 24 MMOL/L (ref 23–29)
CREAT SERPL-MCNC: 0.7 MG/DL (ref 0.5–1.4)
DIFFERENTIAL METHOD: ABNORMAL
EOSINOPHIL # BLD AUTO: 0.1 K/UL (ref 0–0.5)
EOSINOPHIL NFR BLD: 1.9 % (ref 0–8)
ERYTHROCYTE [DISTWIDTH] IN BLOOD BY AUTOMATED COUNT: 12.3 % (ref 11.5–14.5)
EST. GFR  (NO RACE VARIABLE): >60 ML/MIN/1.73 M^2
GLUCOSE SERPL-MCNC: 127 MG/DL (ref 70–110)
HCT VFR BLD AUTO: 35.9 % (ref 37–48.5)
HGB BLD-MCNC: 12.7 G/DL (ref 12–16)
IMM GRANULOCYTES # BLD AUTO: 0.08 K/UL (ref 0–0.04)
IMM GRANULOCYTES NFR BLD AUTO: 1.1 % (ref 0–0.5)
LYMPHOCYTES # BLD AUTO: 1.4 K/UL (ref 1–4.8)
LYMPHOCYTES NFR BLD: 20.1 % (ref 18–48)
MAGNESIUM SERPL-MCNC: 2 MG/DL (ref 1.6–2.6)
MCH RBC QN AUTO: 31.4 PG (ref 27–31)
MCHC RBC AUTO-ENTMCNC: 35.4 G/DL (ref 32–36)
MCV RBC AUTO: 89 FL (ref 82–98)
MONOCYTES # BLD AUTO: 0.6 K/UL (ref 0.3–1)
MONOCYTES NFR BLD: 8.3 % (ref 4–15)
NEUTROPHILS # BLD AUTO: 4.8 K/UL (ref 1.8–7.7)
NEUTROPHILS NFR BLD: 68 % (ref 38–73)
NMO/AQP4 FACS,S: NEGATIVE
NRBC BLD-RTO: 0 /100 WBC
PHOSPHATE SERPL-MCNC: 3.7 MG/DL (ref 2.7–4.5)
PLATELET # BLD AUTO: 234 K/UL (ref 150–450)
PMV BLD AUTO: 9 FL (ref 9.2–12.9)
POTASSIUM SERPL-SCNC: 4.2 MMOL/L (ref 3.5–5.1)
PROT SERPL-MCNC: 5.7 G/DL (ref 6–8.4)
RBC # BLD AUTO: 4.04 M/UL (ref 4–5.4)
SODIUM SERPL-SCNC: 134 MMOL/L (ref 136–145)
WBC # BLD AUTO: 7.02 K/UL (ref 3.9–12.7)

## 2023-09-29 PROCEDURE — 94761 N-INVAS EAR/PLS OXIMETRY MLT: CPT

## 2023-09-29 PROCEDURE — 20000000 HC ICU ROOM

## 2023-09-29 PROCEDURE — 25000003 PHARM REV CODE 250

## 2023-09-29 PROCEDURE — 25000003 PHARM REV CODE 250: Performed by: NURSE PRACTITIONER

## 2023-09-29 PROCEDURE — 84100 ASSAY OF PHOSPHORUS: CPT

## 2023-09-29 PROCEDURE — 51701 INSERT BLADDER CATHETER: CPT

## 2023-09-29 PROCEDURE — 85025 COMPLETE CBC W/AUTO DIFF WBC: CPT

## 2023-09-29 PROCEDURE — 83735 ASSAY OF MAGNESIUM: CPT

## 2023-09-29 PROCEDURE — 25000003 PHARM REV CODE 250: Performed by: REGISTERED NURSE

## 2023-09-29 PROCEDURE — 80053 COMPREHEN METABOLIC PANEL: CPT

## 2023-09-29 PROCEDURE — 51798 US URINE CAPACITY MEASURE: CPT

## 2023-09-29 PROCEDURE — 99233 SBSQ HOSP IP/OBS HIGH 50: CPT | Mod: ,,, | Performed by: PSYCHIATRY & NEUROLOGY

## 2023-09-29 PROCEDURE — 99233 PR SUBSEQUENT HOSPITAL CARE,LEVL III: ICD-10-PCS | Mod: ,,, | Performed by: PSYCHIATRY & NEUROLOGY

## 2023-09-29 RX ADMIN — SIMETHICONE 80 MG: 80 TABLET, CHEWABLE ORAL at 10:09

## 2023-09-29 RX ADMIN — MUPIROCIN: 20 OINTMENT TOPICAL at 08:09

## 2023-09-29 RX ADMIN — OXYCODONE HYDROCHLORIDE 5 MG: 5 TABLET ORAL at 08:09

## 2023-09-29 RX ADMIN — OXYCODONE HYDROCHLORIDE 10 MG: 10 TABLET ORAL at 07:09

## 2023-09-29 RX ADMIN — OXYCODONE HYDROCHLORIDE 5 MG: 5 TABLET ORAL at 04:09

## 2023-09-29 RX ADMIN — GABAPENTIN 800 MG: 400 CAPSULE ORAL at 08:09

## 2023-09-29 RX ADMIN — GABAPENTIN 800 MG: 400 CAPSULE ORAL at 02:09

## 2023-09-29 RX ADMIN — FAMOTIDINE 20 MG: 20 TABLET ORAL at 08:09

## 2023-09-29 RX ADMIN — ATORVASTATIN CALCIUM 40 MG: 40 TABLET, FILM COATED ORAL at 08:09

## 2023-09-29 RX ADMIN — MUPIROCIN: 20 OINTMENT TOPICAL at 09:09

## 2023-09-29 NOTE — PLAN OF CARE
[facility] Care Plan    POC reviewed with Rhina Forrest and family at 0300. Pt verbalized understanding. Questions and concerns addressed. No acute events overnight. Pt progressing toward goals. Will continue to monitor. See below and flowsheets for full assessment and VS info.           Is this a stroke patient? no    Neuro:  Jerilyn Coma Scale  Best Eye Response: 4-->(E4) spontaneous  Best Motor Response: 6-->(M6) obeys commands  Best Verbal Response: 5-->(V5) oriented  [place] Coma Scale Score: 15  Assessment Qualifiers: patient not sedated/intubated  Pupil PERRLA: yes     24hr Temp:  [98.4 °F (36.9 °C)-99.3 °F (37.4 °C)]     CV:   Rhythm: normal sinus rhythm  BP goals:   SBP < 180  MAP > 65    Resp:           Plan: N/A    GI/:     Diet/Nutrition Received: NPO  Last Bowel Movement: 09/27/23  Voiding Characteristics: due to void    Intake/Output Summary (Last 24 hours) at 9/29/2023 0504  Last data filed at 9/28/2023 1800  Gross per 24 hour   Intake 320 ml   Output 850 ml   Net -530 ml     Unmeasured Output  Stool Occurrence: 1    Labs/Accuchecks:  Recent Labs   Lab 09/29/23  0124   WBC 7.02   RBC 4.04   HGB 12.7   HCT 35.9*         Recent Labs   Lab 09/29/23  0124   *   K 4.2   CO2 24      BUN 12   CREATININE 0.7   ALKPHOS 173*   ALT 76*   AST 69*   BILITOT 0.4      Recent Labs   Lab 09/25/23  1136   INR 1.1   APTT 22.7      Recent Labs   Lab 09/25/23  1453   TROPONINI 0.011       Electrolytes: N/A - electrolytes WDL  Accuchecks: none    Gtts:      LDA/Wounds:  Lines/Drains/Airways       Drain  Duration                  Rectal Tube 09/26/23 1603 2 days              Peripheral Intravenous Line  Duration                  Peripheral IV - Single Lumen 09/26/23 1841 18 G Anterior;Left Forearm 2 days         Peripheral IV - Single Lumen 09/26/23 2000 20 G Anterior;Right Forearm 2 days                  Wounds: No  Wound care consulted: No

## 2023-09-29 NOTE — ASSESSMENT & PLAN NOTE
68yo F PMHx GERD, HTN, smoker, depression presenting with bilateral lower extremity plegia that began after heavy lifting on 9/18/23. OSH radiology report for MRI saying cord edema from T9-L1, no disc sent with patient.     MRI Brain w/wo 9/25: patchy periventricular white matter T2/flair hyperintensities demylination vs microvascular disease  MRI C/T/L spine 9/25: Abnormal cord edema from T9 to conus  MRI spine demyelinating/MRA: possible diffusion restriction in distal thoracic spine/conus, non diagnostic MRA    --Patient admitted to [facility]   -q1h neurochecks in ICU  --All labs and diagnostics reviewed  --Cord findings suspicious for mass vs ischemia vs demyelinating disorder  --Spinal angio to evaluate for possible cord ischemia, keep NPO   --Recommend LP and demyelination protocol MRI to evaluate for demyelinating disorder   -- pt refusing LP  --MAP goals >85 at this time   --Bladder scans q6h with I&O cath   --on ASA and plavix for recent NSTEMI - hold in acute setting   --NPO for DSA today  --Continue to monitor clinically, notify NSGY immediately with any changes in neuro status    Discussed with Dr. Unger

## 2023-09-29 NOTE — PT/OT/SLP PROGRESS
Occupational Therapy      Patient Name:  Rhina Forrest   MRN:  [id number]    Patient not seen today secondary to Off the floor for procedure/surgery (angiogram). Will follow-up as appropriate.    9/29/2023

## 2023-09-29 NOTE — PROGRESS NOTES
[facility]  Neurosurgery  Progress Note    Subjective:     History of Present Illness: Rhina Forrest is a 66yo F PMHx GERD, HTN, smoker, depression presenting with bilateral lower extremity plegia that began after heavy lifting on 9/18/23. Pt walks with a walker at baseline and lives at home alone. On 9/18 she was walking home with groceries on her walker and lifted the walker over a curb and hurt her back. Pt was able to walk home and put her groceries away. Once she sat on the couch, she was unable to get back up as her legs stopped moving. She also had decreased sensation from T12 down. Pt called 911 and was taken via EMS to [facility]. MRI cervical and thoracic spine revealed stenosis and T9-L1 cord edema with mild enhancement suspicious for evolving cord infarct vs demyelinating process per radiology report (no imaging was sent with patient). Pt was found to have NSTEMI with troponin 1.1 and was started on ASA and plavix. Pt also c/o bl upper quadrant abdominal pain and CT abdomen pelvis revealed colitis. She was started on cipro and flagyl. Pt has had a barber since 9/18 and has not had any bowel movements since 9/18 despite aggressive bowel regimen at OSH. Transferred to [facility] for neurosurgical evaluation and further workup. Pt currently c/o severe abdominal pain, n/v, severe back pain, BUE rash x 1 month, BLE plegia and T12 sensory level.       Post-Op Info:  * No surgery found *         Interval History: NAEON. Pending DSA.     Medications:  Continuous Infusions:  Scheduled Meds:   atorvastatin  40 mg Oral Daily    famotidine  20 mg Oral BID    gabapentin  800 mg Oral TID    mupirocin   Nasal BID     PRN Meds:diazePAM, hydrALAZINE, morphine, ondansetron, oxyCODONE, oxyCODONE, simethicone     Review of Systems  Objective:     Weight: 91.6 kg (201 lb 15.1 oz)  Body mass index is 34.66 kg/m².  Vital Signs (Most Recent):  Temp: 99.1 °F (37.3 °C) (09/29/23 0701)  Pulse:  "72 (09/29/23 0801)  Resp: (!) 24 (09/29/23 0801)  BP: 135/63 (09/29/23 0801)  SpO2: (!) 93 % (09/29/23 0801) Vital Signs (24h Range):  Temp:  [98.4 °F (36.9 °C)-99.3 °F (37.4 °C)] 99.1 °F (37.3 °C)  Pulse:  [66-91] 72  Resp:  [18-35] 24  SpO2:  [90 %-96 %] 93 %  BP: (112-163)/(61-83) 135/63     Date 09/29/23 0700 - 09/30/23 0659   Shift 2331-9373 8369-6452 2029-0974 24 Hour Total   INTAKE   P.O. 0   0   NG/GT 0   0   Shift Total(mL/kg) 0(0)   0(0)   OUTPUT   Stool 0   0   Shift Total(mL/kg) 0(0)   0(0)   Weight (kg) 91.6 91.6 91.6 91.6                            Rectal Tube 09/26/23 1603 (Active)   Balloon Inflation Volume (mL) 45 09/29/23 0701   Reposition drainage bags for BMS & Hernandez on opposite sides of bed 09/29/23 0701   Outcome no results 09/29/23 0701   Stool Color Brown 09/28/23 1700   Insertion Site Appearance no redness, warmth, tenderness, skin breakdown, drainage 09/29/23 0701   Flush/Irrigation flushed w/;water;no resistance met 09/29/23 0701   Intake (mL) 0 mL 09/29/23 0701   Rectal Tube Output 0 mL 09/29/23 0801          Physical Exam         Neurosurgery Physical Exam  Constitutional: She appears well-developed and well-nourished.      Eyes: Pupils are equal, round, and reactive to light.      Cardiovascular: Normal pulses.      Abdominal: Soft.      Psych/Behavior: She is alert. She is oriented to person, place, and time.      Musculoskeletal:        Neck: Range of motion is full.      Pulm Comf on RA        Neuro Exam: 0/5 in BLE; 5/5 in BUE; Patchy T12 sensory level; no rectal tone, no spasticity    Significant Labs:  Recent Labs   Lab 09/28/23  0131 09/29/23  0124   * 127*   * 134*   K 3.9 4.2    104   CO2 23 24   BUN 13 12   CREATININE 0.6 0.7   CALCIUM 8.4* 8.3*   MG 2.0 2.0     Recent Labs   Lab 09/28/23  0131 09/29/23  0124   WBC 6.76 7.02   HGB 13.2 12.7   HCT 39.2 35.9*    234     No results for input(s): "LABPT", "INR", "APTT" in the last 48 hours.  Microbiology " Results (last 7 days)       Procedure Component Value Units Date/Time    Blood culture [[id number]] Collected: 09/25/23 1449    Order Status: Completed Specimen: Blood from Peripheral, Antecubital, Right Updated: 09/28/23 1812     Blood Culture, Routine No Growth to date      No Growth to date      No Growth to date      No Growth to date    Blood culture [[id number]] Collected: 09/25/23 1450    Order Status: Completed Specimen: Blood from Peripheral, Hand, Left Updated: 09/28/23 1812     Blood Culture, Routine No Growth to date      No Growth to date      No Growth to date      No Growth to date    Urine culture [[id number]] Collected: 09/25/23 1519    Order Status: Completed Specimen: Urine Updated: 09/26/23 2006     Urine Culture, Routine No growth    Narrative:      Specimen Source->Urine    Culture, Respiratory with Gram Stain [[id number]]     Order Status: No result Specimen: Respiratory           All pertinent labs from the last 24 hours have been reviewed.    Significant Diagnostics:  I have reviewed all pertinent imaging results/findings within the past 24 hours.    Assessment/Plan:     * Acute transverse myelitis  68yo F PMHx GERD, HTN, smoker, depression presenting with bilateral lower extremity plegia that began after heavy lifting on 9/18/23. OSH radiology report for MRI saying cord edema from T9-L1, no disc sent with patient.     MRI Brain w/wo 9/25: patchy periventricular white matter T2/flair hyperintensities demylination vs microvascular disease  MRI C/T/L spine 9/25: Abnormal cord edema from T9 to conus  MRI spine demyelinating/MRA: possible diffusion restriction in distal thoracic spine/conus, non diagnostic MRA    --Patient admitted to [facility]   -[facility] neurochecks in ICU  --All labs and diagnostics reviewed  --Cord findings suspicious for mass vs ischemia vs demyelinating disorder  --Spinal angio to evaluate for possible cord ischemia, keep NPO   --Recommend LP and demyelination protocol MRI to  evaluate for demyelinating disorder   -- pt refusing LP  --MAP goals >85 at this time   --Bladder scans q6h with I&O cath   --on ASA and plavix for recent NSTEMI - hold in acute setting   --NPO for DSA today  --Continue to monitor clinically, notify NSGY immediately with any changes in neuro status    Discussed with Dr. Cornel Pack MD  Neurosurgery  [facility] - Neuro Critical Care

## 2023-09-29 NOTE — PLAN OF CARE
[facility] Neuro Critical Care  Discharge Reassessment    Primary Care Provider: Cal Alvarado FNP-C    Expected Discharge Date: 10/4/2023    Per Neurosurgery DSA today.     Patient not medically ready for discharge.       Reassessment (most recent)       Discharge Reassessment - 09/29/23 1548          Discharge Reassessment    Assessment Type Discharge Planning Reassessment     Did the patient's condition or plan change since previous assessment? No     Communicated DEMETRICE with patient/caregiver Date not available/Unable to determine     Discharge Plan A Rehab     Discharge Plan B Home Health     DME Needed Upon Discharge  other (see comments)   tbd    Transition of Care Barriers None     Why the patient remains in the hospital Requires continued medical care                   Yenifer Carvalho RN, CCRN-K, [facility]  Neuro-Critical Care   [phone]

## 2023-09-29 NOTE — SUBJECTIVE & OBJECTIVE
Interval History: NAEON. Pending DSA.     Medications:  Continuous Infusions:  Scheduled Meds:   atorvastatin  40 mg Oral Daily    famotidine  20 mg Oral BID    gabapentin  800 mg Oral TID    mupirocin   Nasal BID     PRN Meds:diazePAM, hydrALAZINE, morphine, ondansetron, oxyCODONE, oxyCODONE, simethicone     Review of Systems  Objective:     Weight: 91.6 kg (201 lb 15.1 oz)  Body mass index is 34.66 kg/m².  Vital Signs (Most Recent):  Temp: 99.1 °F (37.3 °C) (09/29/23 0701)  Pulse: 72 (09/29/23 0801)  Resp: (!) 24 (09/29/23 0801)  BP: 135/63 (09/29/23 0801)  SpO2: (!) 93 % (09/29/23 0801) Vital Signs (24h Range):  Temp:  [98.4 °F (36.9 °C)-99.3 °F (37.4 °C)] 99.1 °F (37.3 °C)  Pulse:  [66-91] 72  Resp:  [18-35] 24  SpO2:  [90 %-96 %] 93 %  BP: (112-163)/(61-83) 135/63     Date 09/29/23 0700 - 09/30/23 0659   Shift 0213-0158 9594-1739 8747-5033 24 Hour Total   INTAKE   P.O. 0   0   NG/GT 0   0   Shift Total(mL/kg) 0(0)   0(0)   OUTPUT   Stool 0   0   Shift Total(mL/kg) 0(0)   0(0)   Weight (kg) 91.6 91.6 91.6 91.6                            Rectal Tube 09/26/23 1603 (Active)   Balloon Inflation Volume (mL) 45 09/29/23 0701   Reposition drainage bags for BMS & Hernandez on opposite sides of bed 09/29/23 0701   Outcome no results 09/29/23 0701   Stool Color Brown 09/28/23 1700   Insertion Site Appearance no redness, warmth, tenderness, skin breakdown, drainage 09/29/23 0701   Flush/Irrigation flushed w/;water;no resistance met 09/29/23 0701   Intake (mL) 0 mL 09/29/23 0701   Rectal Tube Output 0 mL 09/29/23 0801          Physical Exam         Neurosurgery Physical Exam  Constitutional: She appears well-developed and well-nourished.      Eyes: Pupils are equal, round, and reactive to light.      Cardiovascular: Normal pulses.      Abdominal: Soft.      Psych/Behavior: She is alert. She is oriented to person, place, and time.      Musculoskeletal:        Neck: Range of motion is full.      Pulm Comf on RA        Neuro Exam:  "0/5 in BLE; 5/5 in BUE; Patchy T12 sensory level; no rectal tone, no spasticity    Significant Labs:  Recent Labs   Lab 09/28/23  0131 09/29/23  0124   * 127*   * 134*   K 3.9 4.2    104   CO2 23 24   BUN 13 12   CREATININE 0.6 0.7   CALCIUM 8.4* 8.3*   MG 2.0 2.0     Recent Labs   Lab 09/28/23  0131 09/29/23  0124   WBC 6.76 7.02   HGB 13.2 12.7   HCT 39.2 35.9*    234     No results for input(s): "LABPT", "INR", "APTT" in the last 48 hours.  Microbiology Results (last 7 days)       Procedure Component Value Units Date/Time    Blood culture [[id number]] Collected: 09/25/23 1449    Order Status: Completed Specimen: Blood from Peripheral, Antecubital, Right Updated: 09/28/23 1812     Blood Culture, Routine No Growth to date      No Growth to date      No Growth to date      No Growth to date    Blood culture [[id number]] Collected: 09/25/23 1450    Order Status: Completed Specimen: Blood from Peripheral, Hand, Left Updated: 09/28/23 1812     Blood Culture, Routine No Growth to date      No Growth to date      No Growth to date      No Growth to date    Urine culture [[id number]] Collected: 09/25/23 1519    Order Status: Completed Specimen: Urine Updated: 09/26/23 2006     Urine Culture, Routine No growth    Narrative:      Specimen Source->Urine    Culture, Respiratory with Gram Stain [[id number]]     Order Status: No result Specimen: Respiratory           All pertinent labs from the last 24 hours have been reviewed.    Significant Diagnostics:  I have reviewed all pertinent imaging results/findings within the past 24 hours.  "

## 2023-09-29 NOTE — PLAN OF CARE
[facility] Care Plan    POC reviewed with Rhina Forrest and family at 1400. Pt verbalized understanding. Questions and concerns addressed. No acute events today. Pt progressing toward goals. Will continue to monitor. See below and flowsheets for full assessment and VS info.      - Full Linen Change/Bed Bath  - Oxycodone 5 mg given x1; Oxycodone 10 mg given x2 for back pain.  - Pending IR Angiogram. Pt remains NPO.   - q4 neuro checks            Is this a stroke patient? no    Neuro:  [place] Coma Scale  Best Eye Response: 4-->(E4) spontaneous  Best Motor Response: 6-->(M6) obeys commands  Best Verbal Response: 5-->(V5) oriented  [place]n Coma Scale Score: 15  Assessment Qualifiers: patient not sedated/intubated  Pupil PERRLA: yes     24 hr Temp:  [98.4 °F (36.9 °C)-99.7 °F (37.6 °C)]     CV:   Rhythm: normal sinus rhythm  BP goals:   SBP < 200  MAP > 85    Resp:           Plan: N/A    GI/:     Diet/Nutrition Received: ice chips  Last Bowel Movement: 09/27/23  Voiding Characteristics: due to void    Intake/Output Summary (Last 24 hours) at 9/29/2023 1751  Last data filed at 9/29/2023 1744  Gross per 24 hour   Intake 460 ml   Output 450 ml   Net 10 ml     Unmeasured Output  Stool Occurrence: 1    Labs/Accuchecks:  Recent Labs   Lab 09/29/23  0124   WBC 7.02   RBC 4.04   HGB 12.7   HCT 35.9*         Recent Labs   Lab 09/29/23  0124   *   K 4.2   CO2 24      BUN 12   CREATININE 0.7   ALKPHOS 173*   ALT 76*   AST 69*   BILITOT 0.4      Recent Labs   Lab 09/25/23  1136   INR 1.1   APTT 22.7      Recent Labs   Lab 09/25/23  1453   TROPONINI 0.011       Electrolytes: No replacement orders  Accuchecks: none    Gtts:      LDA/Wounds:  Lines/Drains/Airways       Drain  Duration                  Rectal Tube 09/26/23 1603 3 days              Peripheral Intravenous Line  Duration                  Peripheral IV - Single Lumen 09/26/23 1841 18 G Anterior;Left Forearm 2 days         Peripheral IV - Single Lumen  09/26/23 2000 20 G Anterior;Right Forearm 2 days                  Wounds: Yes  Wound care consulted: Yes

## 2023-09-30 LAB
ALBUMIN SERPL BCP-MCNC: 2.1 G/DL (ref 3.5–5.2)
ALP SERPL-CCNC: 172 U/L (ref 55–135)
ALT SERPL W/O P-5'-P-CCNC: 89 U/L (ref 10–44)
ANION GAP SERPL CALC-SCNC: 5 MMOL/L (ref 8–16)
AST SERPL-CCNC: 72 U/L (ref 10–40)
BACTERIA BLD CULT: NORMAL
BACTERIA BLD CULT: NORMAL
BASOPHILS # BLD AUTO: 0.04 K/UL (ref 0–0.2)
BASOPHILS NFR BLD: 0.6 % (ref 0–1.9)
BILIRUB SERPL-MCNC: 0.5 MG/DL (ref 0.1–1)
BUN SERPL-MCNC: 12 MG/DL (ref 8–23)
CALCIUM SERPL-MCNC: 8.3 MG/DL (ref 8.7–10.5)
CHLORIDE SERPL-SCNC: 102 MMOL/L (ref 95–110)
CO2 SERPL-SCNC: 23 MMOL/L (ref 23–29)
CREAT SERPL-MCNC: 0.7 MG/DL (ref 0.5–1.4)
DIFFERENTIAL METHOD: ABNORMAL
EOSINOPHIL # BLD AUTO: 0.1 K/UL (ref 0–0.5)
EOSINOPHIL NFR BLD: 1.6 % (ref 0–8)
ERYTHROCYTE [DISTWIDTH] IN BLOOD BY AUTOMATED COUNT: 12.2 % (ref 11.5–14.5)
EST. GFR  (NO RACE VARIABLE): >60 ML/MIN/1.73 M^2
GLUCOSE SERPL-MCNC: 140 MG/DL (ref 70–110)
HCT VFR BLD AUTO: 35.6 % (ref 37–48.5)
HGB BLD-MCNC: 12 G/DL (ref 12–16)
IMM GRANULOCYTES # BLD AUTO: 0.06 K/UL (ref 0–0.04)
IMM GRANULOCYTES NFR BLD AUTO: 0.9 % (ref 0–0.5)
LYMPHOCYTES # BLD AUTO: 1.4 K/UL (ref 1–4.8)
LYMPHOCYTES NFR BLD: 20.2 % (ref 18–48)
MAGNESIUM SERPL-MCNC: 1.9 MG/DL (ref 1.6–2.6)
MCH RBC QN AUTO: 31.6 PG (ref 27–31)
MCHC RBC AUTO-ENTMCNC: 33.7 G/DL (ref 32–36)
MCV RBC AUTO: 94 FL (ref 82–98)
MONOCYTES # BLD AUTO: 0.5 K/UL (ref 0.3–1)
MONOCYTES NFR BLD: 7.3 % (ref 4–15)
NEUTROPHILS # BLD AUTO: 4.9 K/UL (ref 1.8–7.7)
NEUTROPHILS NFR BLD: 69.4 % (ref 38–73)
NRBC BLD-RTO: 0 /100 WBC
PHOSPHATE SERPL-MCNC: 3.6 MG/DL (ref 2.7–4.5)
PLATELET # BLD AUTO: 244 K/UL (ref 150–450)
PMV BLD AUTO: 9.2 FL (ref 9.2–12.9)
POTASSIUM SERPL-SCNC: 4.3 MMOL/L (ref 3.5–5.1)
PROT SERPL-MCNC: 5.6 G/DL (ref 6–8.4)
RBC # BLD AUTO: 3.8 M/UL (ref 4–5.4)
SODIUM SERPL-SCNC: 130 MMOL/L (ref 136–145)
WBC # BLD AUTO: 6.99 K/UL (ref 3.9–12.7)

## 2023-09-30 PROCEDURE — 25000003 PHARM REV CODE 250: Performed by: NURSE PRACTITIONER

## 2023-09-30 PROCEDURE — 84100 ASSAY OF PHOSPHORUS: CPT

## 2023-09-30 PROCEDURE — 80053 COMPREHEN METABOLIC PANEL: CPT

## 2023-09-30 PROCEDURE — 25000003 PHARM REV CODE 250: Performed by: REGISTERED NURSE

## 2023-09-30 PROCEDURE — 63600175 PHARM REV CODE 636 W HCPCS: Performed by: NURSE PRACTITIONER

## 2023-09-30 PROCEDURE — 94761 N-INVAS EAR/PLS OXIMETRY MLT: CPT

## 2023-09-30 PROCEDURE — 51701 INSERT BLADDER CATHETER: CPT

## 2023-09-30 PROCEDURE — 51798 US URINE CAPACITY MEASURE: CPT

## 2023-09-30 PROCEDURE — 21400001 HC TELEMETRY ROOM

## 2023-09-30 PROCEDURE — 85025 COMPLETE CBC W/AUTO DIFF WBC: CPT

## 2023-09-30 PROCEDURE — 99233 SBSQ HOSP IP/OBS HIGH 50: CPT | Mod: ,,, | Performed by: NURSE PRACTITIONER

## 2023-09-30 PROCEDURE — 83735 ASSAY OF MAGNESIUM: CPT

## 2023-09-30 PROCEDURE — 99233 PR SUBSEQUENT HOSPITAL CARE,LEVL III: ICD-10-PCS | Mod: ,,, | Performed by: NURSE PRACTITIONER

## 2023-09-30 PROCEDURE — 25000003 PHARM REV CODE 250

## 2023-09-30 RX ORDER — HEPARIN SODIUM 5000 [USP'U]/ML
5000 INJECTION, SOLUTION INTRAVENOUS; SUBCUTANEOUS EVERY 8 HOURS
Status: DISCONTINUED | OUTPATIENT
Start: 2023-09-30 | End: 2023-10-05

## 2023-09-30 RX ORDER — ATORVASTATIN CALCIUM 20 MG/1
20 TABLET, FILM COATED ORAL DAILY
Status: DISCONTINUED | OUTPATIENT
Start: 2023-10-01 | End: 2023-10-11 | Stop reason: HOSPADM

## 2023-09-30 RX ADMIN — GABAPENTIN 800 MG: 400 CAPSULE ORAL at 03:09

## 2023-09-30 RX ADMIN — GABAPENTIN 800 MG: 400 CAPSULE ORAL at 09:09

## 2023-09-30 RX ADMIN — HEPARIN SODIUM 5000 UNITS: 5000 INJECTION INTRAVENOUS; SUBCUTANEOUS at 03:09

## 2023-09-30 RX ADMIN — MUPIROCIN: 20 OINTMENT TOPICAL at 09:09

## 2023-09-30 RX ADMIN — FAMOTIDINE 20 MG: 20 TABLET ORAL at 09:09

## 2023-09-30 RX ADMIN — OXYCODONE HYDROCHLORIDE 5 MG: 5 TABLET ORAL at 06:09

## 2023-09-30 RX ADMIN — ATORVASTATIN CALCIUM 40 MG: 40 TABLET, FILM COATED ORAL at 09:09

## 2023-09-30 RX ADMIN — HEPARIN SODIUM 5000 UNITS: 5000 INJECTION INTRAVENOUS; SUBCUTANEOUS at 09:09

## 2023-09-30 RX ADMIN — OXYCODONE HYDROCHLORIDE 10 MG: 10 TABLET ORAL at 09:09

## 2023-09-30 RX ADMIN — OXYCODONE HYDROCHLORIDE 5 MG: 5 TABLET ORAL at 04:09

## 2023-09-30 NOTE — ASSESSMENT & PLAN NOTE
66yo F PMHx GERD, HTN, smoker, depression presenting with bilateral lower extremity plegia that began after heavy lifting on 9/18/23. OSH radiology report for MRI saying cord edema from T9-L1, no disc sent with patient.     MRI Brain w/wo 9/25: patchy periventricular white matter T2/flair hyperintensities demylination vs microvascular disease  MRI C/T/L spine 9/25: Abnormal cord edema from T9 to conus  MRI spine demyelinating/MRA: possible diffusion restriction in distal thoracic spine/conus, non diagnostic MRA    --Patient admitted to [facility]   -q1h neurochecks in ICU  --All labs and diagnostics reviewed  --Cord findings suspicious for mass vs ischemia vs demyelinating disorder  --Spinal angio to evaluate for possible cord ischemia when able.   --Recommend LP to evaluate for demyelinating disorder   -- pt refusing LP  --MAP goals >85 at this time   --Bladder scans q6h with I&O cath   --on ASA and plavix for recent NSTEMI - hold in acute setting   --Neurosurgery following

## 2023-09-30 NOTE — PROVIDER TRANSFER
Neuro Critical Care Transfer of Care note    Date of Admit: 9/25/2023  Date of Transfer / Stepdown: 9/30/2023    Brief History of Present Illness:     Chief Complaint: Acute transverse myelitis     History of Present Illness: Rhina Forrest is a 66yo F PMHx GERD, HTN, smoker, depression presenting with bilateral lower extremity paresis and paresthesia that began after heavy lifting on 9/18/23. Pt walks with a walker at baseline and lives at home alone. On 9/18 she was walking home with groceries on her walker and lifted the walker over a curb and hurt her back. Pt was able to walk home and put her groceries away. Once she sat on the couch, she was unable to get back up as her legs stopped moving. She also had decreased sensation from T12 down. Pt called 911 and was taken via EMS to [facility]. MRI cervical and thoracic spine revealed stenosis and T9-L1 cord edema with mild enhancement suspicious for evolving cord infarct vs demyelinating process. Pt was found to have NSTEMI with troponin 1.1 and was started on ASA and plavix. Pt also c/o bl upper quadrant abdominal pain and CT abdomen pelvis revealed colitis. She was started on cipro and flagyl. Pt has had a barber since 9/18 and has not had any bowel movements since 9/18 despite aggressive bowel regimen at OSH. [place] [facility] [place] and further workup. Pt currently c/o severe abdominal pain, n/v, severe back pain, BUE rash x 1 month, BLE paresis and paresthesia. Directly admitted to [facility] for higher level of care.         Hospital Course: 09/26/2023 naeon, improved abdominal pain w bowel movement, no change in exam  09/27/2023: NAEON. Antibiotics stopped. Pain control regimen adjusted. Patient refusing spinal angio today, will communicate with Neuro IR to see if she can be moved till tomorrow.   09/28/2023 continued pain control issues, awaiting further diagnostic davis  9/30/2023- NAEON, added sq heparin, decrease statin d/t  mild transaminitis           Hospital Course By Problem with Pertinent Findings:     Neuro  * Acute transverse myelitis  68yo F PMHx GERD, HTN, smoker, depression presenting with bilateral lower extremity paresis and paresthesia that began after heavy lifting on 9/18/23. OSH MRI cervical and thoracic spine revealed stenosis and T9-L1 central cord edema with mild enhancement. MRA spine w DWI inconclusive     Sudden onset with valsalva and evolution over 3 hours most suspicious for a vascular lesion, ddx includes demyelination and infectious processes      -q4 neuro checks, sleep holiday  -permissive htn, aggressive bowel/bladder care, aggressive pulm toilet  -multimodal pain control  -awaiting spinal angio and LP  -serum NMO negative  -pt/ot, diet, oob as able     GI  GERD (gastroesophageal reflux disease)  Famotidine     Consultants and Procedures:     Consultants:  GEOVANY  Neuro IR    Procedures:    None  Pending spinal angio    Transfer Information:     Diet:  REgular    Physical Activity:  PT/OT  As tolerated  To Do / Pending Studies / Follow ups:  Pending spinal angiogram per Neuro IR/NSGY    Jud Aguilar  Neuro Critical Care

## 2023-09-30 NOTE — NURSING
[facility] Care Plan    POC reviewed with Rhina Forrest and family at 1400. Pt verbalized understanding. Questions and concerns addressed. No acute events today. Pt progressing toward goals. Will continue to monitor. See below and flowsheets for full assessment and VS info.     - Oxycodone given x1.   - Possible IR Angiogram 10/02/23; Continue regular diet.   - Removed 1  IV. Replaced with 1 IV in L Hand.    - Straight Cath 400 mL.          Is this a stroke patient? no    Neuro:  [place] Coma Scale  Best Eye Response: 4-->(E4) spontaneous  Best Motor Response: 6-->(M6) obeys commands  Best Verbal Response: 5-->(V5) oriented  [place] Coma Scale Score: 15  Assessment Qualifiers: patient not sedated/intubated  Pupil PERRLA: yes     24 hr Temp:  [98.5 °F (36.9 °C)-99.5 °F (37.5 °C)]     CV:   Rhythm: normal sinus rhythm  BP goals:   SBP <  200  MAP > 85    Resp:           Plan: N/A    GI/:     Diet/Nutrition Received: regular  Last Bowel Movement: 23  Voiding Characteristics: due to void    Intake/Output Summary (Last 24 hours) at 2023 1650  Last data filed at 2023 1601  Gross per 24 hour   Intake 960 ml   Output 1400 ml   Net -440 ml     Unmeasured Output  Stool Occurrence: 1    Labs/Accuchecks:  Recent Labs   Lab 23  0423   WBC 6.99   RBC 3.80*   HGB 12.0   HCT 35.6*         Recent Labs   Lab 23  0423   *   K 4.3   CO2 23      BUN 12   CREATININE 0.7   ALKPHOS 172*   ALT 89*   AST 72*   BILITOT 0.5      Recent Labs   Lab 23  1136   INR 1.1   APTT 22.7      Recent Labs   Lab 23  1453   TROPONINI 0.011       Electrolytes: No replacement orders  Accuchecks: none    Gtts:      LDA/Wounds:  Lines/Drains/Airways       Drain  Duration                  Rectal Tube 23 1603 4 days              Peripheral Intravenous Line  Duration                  Peripheral IV - Single Lumen 23 1841 18 G Anterior;Left Forearm 3 days         Peripheral IV - Single  Lumen 09/30/23 1612 22 G Left;Posterior Hand <1 day                  Wounds: Yes  Wound care consulted: Yes

## 2023-09-30 NOTE — PT/OT/SLP PROGRESS
Occupational Therapy      Patient Name:  Rhina Forrest   MRN:  [id number]    Patient not seen today secondary to OT eval hold till post spinal angio. Will follow-up as appropriate.  Jennifer Christie OT    9/30/2023

## 2023-09-30 NOTE — PLAN OF CARE
[facility] Care Plan    POC reviewed with Rhina Forrest  at 0300. Pt verbalized understanding. Questions and concerns addressed. No acute events overnight. Pt progressing toward goals. Will continue to monitor. See below and flowsheets for full assessment and VS info.   -Oxy given for pain        Is this a stroke patient? no    Neuro:  Jerilyn Coma Scale  Best Eye Response: 4-->(E4) spontaneous  Best Motor Response: 6-->(M6) obeys commands  Best Verbal Response: 5-->(V5) oriented  [place] Coma Scale Score: 15  Assessment Qualifiers: patient not sedated/intubated  Pupil PERRLA: yes     24hr Temp:  [98.5 °F (36.9 °C)-99.7 °F (37.6 °C)]     CV:   Rhythm: normal sinus rhythm  BP goals:   SBP <  200  MAP > 85    Resp:           Plan: N/A    GI/:     Diet/Nutrition Received: regular  Last Bowel Movement: 09/29/23  Voiding Characteristics: due to void    Intake/Output Summary (Last 24 hours) at 9/30/2023 0447  Last data filed at 9/29/2023 2103  Gross per 24 hour   Intake 350 ml   Output 1000 ml   Net -650 ml     Unmeasured Output  Stool Occurrence: 1    Labs/Accuchecks:  Recent Labs   Lab 09/29/23  0124   WBC 7.02   RBC 4.04   HGB 12.7   HCT 35.9*         Recent Labs   Lab 09/29/23  0124   *   K 4.2   CO2 24      BUN 12   CREATININE 0.7   ALKPHOS 173*   ALT 76*   AST 69*   BILITOT 0.4      Recent Labs   Lab 09/25/23  1136   INR 1.1   APTT 22.7      Recent Labs   Lab 09/25/23  1453   TROPONINI 0.011       Electrolytes: N/A - electrolytes WDL  Accuchecks: none    Gtts:      LDA/Wounds:  Lines/Drains/Airways       Drain  Duration                  Rectal Tube 09/26/23 1603 3 days              Peripheral Intravenous Line  Duration                  Peripheral IV - Single Lumen 09/26/23 1841 18 G Anterior;Left Forearm 3 days         Peripheral IV - Single Lumen 09/26/23 2000 20 G Anterior;Right Forearm 3 days                  Wounds: Yes  Wound care consulted: Yes

## 2023-09-30 NOTE — PROGRESS NOTES
Subjective:  naeon    Objective:  Constitutional: Older than stated age. No apparent distress.   Eyes: Conjunctiva clear, anicteric. Lids no lesions.  Head/Ears/Nose/Mouth/Throat/Neck: Poor dentition. Moist mucous membranes. External ears, nose atraumatic.   Cardiovascular: Regular rhythm. No leg edema.  Respiratory: Comfortable respirations. Clear to auscultation.  Gastrointestinal: Soft, nondistended, nontender. + bowel sounds.     Neurologic:  -GCS E 4 V 5 M 6  -Alert. Oriented to person, place, and time. Speech fluent. Follows commands.  -Cranial nerves: EOM intact, PERRL, no facial droop, + cough  -Motor: BUE 5/5, BLE 0/5  -Sensation: T12 sensory level  -absent DTR in knee and ankles    Assessment/Plan:  68yo F PMHx GERD, HTN, smoker, depression presenting with bilateral lower extremity paresis and paresthesia that began after heavy lifting on 9/18/23. OSH MRI cervical and thoracic spine revealed stenosis and T9-L1 central cord edema with mild enhancement. MRA spine w DWI inconclusive     Sudden onset with valsalva and evolution over 3 hours most suspicious for a vascular lesion, ddx includes demyelination and infectious processes      -q4 neuro checks, sleep holiday  -permissive htn, aggressive bowel/bladder care, aggressive pulm toilet  -multimodal pain control  -awaiting spinal angio and LP  -serum NMO pending  -pt/ot, diet, oob as able    Ayaan Duncan MD  Neurocritical Care   Neurology-Epilepsy

## 2023-09-30 NOTE — SUBJECTIVE & OBJECTIVE
Review of Systems  Constitutional: Denies fevers, weight loss, chills, or weakness.  Eyes: Denies changes in vision.  ENT: Denies dysphagia, nasal discharge, ear pain or discharge.  Cardiovascular: Denies chest pain, palpitations, orthopnea, or claudication.  Respiratory: Denies shortness of breath, cough, hemoptysis, or wheezing.  GI: Denies nausea/vomitting, hematochezia, melena, abd pain, or changes in appetite.  : Denies dysuria, incontinence, or hematuria.  Musculoskeletal: Denies joint pain or myalgias.  Skin/breast: Denies rashes, lumps, lesions, or discharge.  Neurologic: Denies headache, dizziness, vertigo, or paresthesias.  Psychiatric: Denies changes in mood or hallucinations.  Endocrine: Denies polyuria, polydipsia, heat/cold intolerance.  Hematologic/Lymph: Denies lymphadenopathy, easy bruising or easy bleeding.  Allergic/Immunologic: Denies rash, rhinitis.    Objective:     Vitals:  Temp: 99.5 °F (37.5 °C)  Pulse: 79  Rhythm: normal sinus rhythm  BP: (!) 142/64  MAP (mmHg): 92  Resp: (!) 45  SpO2: 97 %    Temp  Min: 98.5 °F (36.9 °C)  Max: 99.7 °F (37.6 °C)  Pulse  Min: 71  Max: 88  BP  Min: 106/64  Max: 154/88  MAP (mmHg)  Min: 81  Max: 114  Resp  Min: 15  Max: 45  SpO2  Min: 91 %  Max: 97 %    09/29 0701 - 09/30 0700  In: 350 [P.O.:350]  Out: 950 [Urine:900]   Unmeasured Output  Stool Occurrence: 1        Physical Exam  GA: Alert, comfortable, no acute distress.   HEENT: No scleral icterus or JVD.   Pulmonary: Clear to auscultation A/L.   Cardiac: RRR S1 & S2 w/o rubs/murmurs/gallops.   Abdominal: Bowel sounds present x 4. No appreciable hepatosplenomegaly.  Skin: No jaundice, rashes, or visible lesions.  Neuro:  --GCS: E4 V5 M6  --Mental Status:  awake, follows commands  --CN II-XII grossly intact.   --Pupils 3mm, PERRL.   --Corneal reflex, gag, cough intact.  --BUE spont  --BLE plegic       Medications:  Continuous Scheduled[START ON 10/1/2023] atorvastatin, 20 mg, Daily  famotidine, 20 mg,  BID  gabapentin, 800 mg, TID  heparin (porcine), 5,000 Units, Q8H  mupirocin, , BID    PRNdiazePAM, 5 mg, Q4H PRN  hydrALAZINE, 10 mg, Q6H PRN  morphine, 2 mg, Q4H PRN  ondansetron, 4 mg, Q8H PRN  oxyCODONE, 5 mg, Q4H PRN  oxyCODONE, 10 mg, Q4H PRN  simethicone, 1 tablet, TID PRN      Today I personally reviewed pertinent medications, lines/drains/airways, imaging, cardiology results, laboratory results, microbiology results,    Diet  Diet Adult Regular (IDDSI Level 7) Thin; Standard Tray  Diet Adult Regular (IDDSI Level 7) Thin; Standard Tray

## 2023-09-30 NOTE — ASSESSMENT & PLAN NOTE
66yo F PMHx GERD, HTN, smoker, depression presenting with bilateral lower extremity paresis and paresthesia that began after heavy lifting on 9/18/23. OSH MRI cervical and thoracic spine revealed stenosis and T9-L1 central cord edema with mild enhancement. MRA spine w DWI inconclusive    Sudden onset with valsalva and evolution over 3 hours most suspicious for a vascular lesion, ddx includes demyelination and infectious processes     -q4 neuro checks, sleep holiday  -permissive htn, aggressive bowel/bladder care, aggressive pulm toilet  -multimodal pain control  -awaiting spinal angio and LP  -serum NMO negative  -pt/ot, diet, oob as able

## 2023-09-30 NOTE — PROGRESS NOTES
[facility]  Neurocritical Care  Progress Note    Admit Date: 9/25/2023  Service Date: 09/30/2023  Length of Stay: 5    Subjective:     Chief Complaint: Acute transverse myelitis    History of Present Illness: Rhina Forrest is a 68yo F PMHx GERD, HTN, smoker, depression presenting with bilateral lower extremity paresis and paresthesia that began after heavy lifting on 9/18/23. Pt walks with a walker at baseline and lives at home alone. On 9/18 she was walking home with groceries on her walker and lifted the walker over a curb and hurt her back. Pt was able to walk home and put her groceries away. Once she sat on the couch, she was unable to get back up as her legs stopped moving. She also had decreased sensation from T12 down. Pt called 911 and was taken via EMS to [facility]. MRI cervical and thoracic spine revealed stenosis and T9-L1 cord edema with mild enhancement suspicious for evolving cord infarct vs demyelinating process. Pt was found to have NSTEMI with troponin 1.1 and was started on ASA and plavix. Pt also c/o bl upper quadrant abdominal pain and CT abdomen pelvis revealed colitis. She was started on cipro and flagyl. Pt has had a barber since 9/18 and has not had any bowel movements since 9/18 despite aggressive bowel regimen at OSH. [place] [facility] [place] and further workup. Pt currently c/o severe abdominal pain, n/v, severe back pain, BUE rash x 1 month, BLE paresis and paresthesia. Directly admitted to [facility] for higher level of care.       Hospital Course: 09/26/2023 naeon, improved abdominal pain w bowel movement, no change in exam  09/27/2023: NAEON. Antibiotics stopped. Pain control regimen adjusted. Patient refusing spinal angio today, will communicate with Neuro IR to see if she can be moved till tomorrow.   09/28/2023 continued pain control issues, awaiting further diagnostic davis  9/30/2023- NAEON, added sq heparin, decrease statin  d/t mild transaminitis          Review of Systems  Constitutional: Denies fevers, weight loss, chills, or weakness.  Eyes: Denies changes in vision.  ENT: Denies dysphagia, nasal discharge, ear pain or discharge.  Cardiovascular: Denies chest pain, palpitations, orthopnea, or claudication.  Respiratory: Denies shortness of breath, cough, hemoptysis, or wheezing.  GI: Denies nausea/vomitting, hematochezia, melena, abd pain, or changes in appetite.  : Denies dysuria, incontinence, or hematuria.  Musculoskeletal: Denies joint pain or myalgias.  Skin/breast: Denies rashes, lumps, lesions, or discharge.  Neurologic: Denies headache, dizziness, vertigo, or paresthesias.  Psychiatric: Denies changes in mood or hallucinations.  Endocrine: Denies polyuria, polydipsia, heat/cold intolerance.  Hematologic/Lymph: Denies lymphadenopathy, easy bruising or easy bleeding.  Allergic/Immunologic: Denies rash, rhinitis.    Objective:     Vitals:  Temp: 99.5 °F (37.5 °C)  Pulse: 79  Rhythm: normal sinus rhythm  BP: (!) 142/64  MAP (mmHg): 92  Resp: (!) 45  SpO2: 97 %    Temp  Min: 98.5 °F (36.9 °C)  Max: 99.7 °F (37.6 °C)  Pulse  Min: 71  Max: 88  BP  Min: 106/64  Max: 154/88  MAP (mmHg)  Min: 81  Max: 114  Resp  Min: 15  Max: 45  SpO2  Min: 91 %  Max: 97 %    09/29 0701 - 09/30 0700  In: 350 [P.O.:350]  Out: 950 [Urine:900]   Unmeasured Output  Stool Occurrence: 1        Physical Exam  GA: Alert, comfortable, no acute distress.   HEENT: No scleral icterus or JVD.   Pulmonary: Clear to auscultation A/L.   Cardiac: RRR S1 & S2 w/o rubs/murmurs/gallops.   Abdominal: Bowel sounds present x 4. No appreciable hepatosplenomegaly.  Skin: No jaundice, rashes, or visible lesions.  Neuro:  --GCS: E4 V5 M6  --Mental Status:  awake, follows commands  --CN II-XII grossly intact.   --Pupils 3mm, PERRL.   --Corneal reflex, gag, cough intact.  --BUE spont  --BLE plegic       Medications:  Continuous Scheduled[START ON 10/1/2023] atorvastatin, 20 mg,  Daily  famotidine, 20 mg, BID  gabapentin, 800 mg, TID  heparin (porcine), 5,000 Units, Q8H  mupirocin, , BID    PRNdiazePAM, 5 mg, Q4H PRN  hydrALAZINE, 10 mg, Q6H PRN  morphine, 2 mg, Q4H PRN  ondansetron, 4 mg, Q8H PRN  oxyCODONE, 5 mg, Q4H PRN  oxyCODONE, 10 mg, Q4H PRN  simethicone, 1 tablet, TID PRN      Today I personally reviewed pertinent medications, lines/drains/airways, imaging, cardiology results, laboratory results, microbiology results,    Diet  Diet Adult Regular (IDDSI Level 7) Thin; Standard Tray  Diet Adult Regular (IDDSI Level 7) Thin; Standard Tray          Assessment/Plan:     Neuro  * Acute transverse myelitis  68yo F PMHx GERD, HTN, smoker, depression presenting with bilateral lower extremity paresis and paresthesia that began after heavy lifting on 9/18/23. OSH MRI cervical and thoracic spine revealed stenosis and T9-L1 central cord edema with mild enhancement. MRA spine w DWI inconclusive    Sudden onset with valsalva and evolution over 3 hours most suspicious for a vascular lesion, ddx includes demyelination and infectious processes     -q4 neuro checks, sleep holiday  -permissive htn, aggressive bowel/bladder care, aggressive pulm toilet  -multimodal pain control  -awaiting spinal angio and LP  -serum NMO negative  -pt/ot, diet, oob as able    GI  GERD (gastroesophageal reflux disease)  · Famotidine           The patient is being Prophylaxed for:  Venous Thromboembolism with: Chemical  Stress Ulcer with: H2B  Ventilator Pneumonia with: not applicable    Activity Orders          Diet Adult Regular (IDDSI Level 7) Thin; Standard Tray: Regular starting at 09/29 1834    Turn patient starting at 09/25 1200    Elevate HOB starting at 09/25 1108        Full Code    Jud Aguilar NP  Neurocritical Care  [facility]

## 2023-09-30 NOTE — SUBJECTIVE & OBJECTIVE
Interval History:   9/30: NAEON. Awaiting spinal angio. Clinically stable.     Medications:  Continuous Infusions:  Scheduled Meds:   atorvastatin  40 mg Oral Daily    famotidine  20 mg Oral BID    gabapentin  800 mg Oral TID    mupirocin   Nasal BID     PRN Meds:diazePAM, hydrALAZINE, morphine, ondansetron, oxyCODONE, oxyCODONE, simethicone     Review of Systems  Objective:     Weight: 91.6 kg (201 lb 15.1 oz)  Body mass index is 34.66 kg/m².  Vital Signs (Most Recent):  Temp: 98.5 °F (36.9 °C) (09/30/23 0701)  Pulse: 76 (09/30/23 0701)  Resp: (!) 23 (09/30/23 0701)  BP: 106/64 (09/30/23 0701)  SpO2: (!) 93 % (09/30/23 0701) Vital Signs (24h Range):  Temp:  [98.5 °F (36.9 °C)-99.7 °F (37.6 °C)] 98.5 °F (36.9 °C)  Pulse:  [68-88] 76  Resp:  [15-28] 23  SpO2:  [91 %-96 %] 93 %  BP: (106-163)/(58-88) 106/64     Date 09/30/23 0700 - 10/01/23 0659   Shift 2663-3805 9926-6497 6645-2588 24 Hour Total   INTAKE   P.O. 0   0   NG/GT 0   0   Shift Total(mL/kg) 0(0)   0(0)   OUTPUT   Stool 0   0   Shift Total(mL/kg) 0(0)   0(0)   Weight (kg) 91.6 91.6 91.6 91.6                            Rectal Tube 09/26/23 1603 (Active)   Balloon Inflation Volume (mL) 45 09/30/23 0701   Reposition drainage bags for BMS & Hernandez on opposite sides of bed 09/30/23 0701   Outcome no results 09/30/23 0701   Stool Color Brown 09/28/23 1700   Insertion Site Appearance no redness, warmth, tenderness, skin breakdown, drainage 09/30/23 0701   Flush/Irrigation flushed w/;water;no resistance met 09/30/23 0701   Intake (mL) 0 mL 09/30/23 0701   Rectal Tube Output 0 mL 09/30/23 0701          Physical Exam         Neurosurgery Physical Exam    Constitutional: She appears well-developed and well-nourished.      Eyes: Pupils are equal, round, and reactive to light.      Cardiovascular: Normal pulses.      Abdominal: Soft.      Psych/Behavior: She is alert. She is oriented to person, place, and time.      Musculoskeletal:        Neck: Range of motion is full.  "     Pulm Comf on RA        Neuro Exam: 0/5 in BLE; 5/5 in BUE; Patchy T12 sensory level; no rectal tone, no spasticity            Significant Labs:  Recent Labs   Lab 09/29/23  0124 09/30/23 0423   * 140*   * 130*   K 4.2 4.3    102   CO2 24 23   BUN 12 12   CREATININE 0.7 0.7   CALCIUM 8.3* 8.3*   MG 2.0 1.9     Recent Labs   Lab 09/29/23  0124 09/30/23 0423   WBC 7.02 6.99   HGB 12.7 12.0   HCT 35.9* 35.6*    244     No results for input(s): "LABPT", "INR", "APTT" in the last 48 hours.  Microbiology Results (last 7 days)       Procedure Component Value Units Date/Time    Blood culture [[id number]] Collected: 09/25/23 1449    Order Status: Completed Specimen: Blood from Peripheral, Antecubital, Right Updated: 09/29/23 1812     Blood Culture, Routine No Growth to date      No Growth to date      No Growth to date      No Growth to date      No Growth to date    Blood culture [[id number]] Collected: 09/25/23 1450    Order Status: Completed Specimen: Blood from Peripheral, Hand, Left Updated: 09/29/23 1812     Blood Culture, Routine No Growth to date      No Growth to date      No Growth to date      No Growth to date      No Growth to date    Urine culture [[id number]] Collected: 09/25/23 1519    Order Status: Completed Specimen: Urine Updated: 09/26/23 2006     Urine Culture, Routine No growth    Narrative:      Specimen Source->Urine    Culture, Respiratory with Gram Stain [[id number]]     Order Status: No result Specimen: Respiratory           All pertinent labs from the last 24 hours have been reviewed.    Significant Diagnostics:  I have reviewed all pertinent imaging results/findings within the past 24 hours.  "

## 2023-09-30 NOTE — PROGRESS NOTES
[facility]  Neurosurgery  Progress Note    Subjective:     History of Present Illness: Rhina Forrest is a 66yo F PMHx GERD, HTN, smoker, depression presenting with bilateral lower extremity plegia that began after heavy lifting on 9/18/23. Pt walks with a walker at baseline and lives at home alone. On 9/18 she was walking home with groceries on her walker and lifted the walker over a curb and hurt her back. Pt was able to walk home and put her groceries away. Once she sat on the couch, she was unable to get back up as her legs stopped moving. She also had decreased sensation from T12 down. Pt called 911 and was taken via EMS to [facility]. MRI cervical and thoracic spine revealed stenosis and T9-L1 cord edema with mild enhancement suspicious for evolving cord infarct vs demyelinating process per radiology report (no imaging was sent with patient). Pt was found to have NSTEMI with troponin 1.1 and was started on ASA and plavix. Pt also c/o bl upper quadrant abdominal pain and CT abdomen pelvis revealed colitis. She was started on cipro and flagyl. Pt has had a barber since 9/18 and has not had any bowel movements since 9/18 despite aggressive bowel regimen at OSH. Transferred to [facility] [place]surgical evaluation and further workup. Pt currently c/o severe abdominal pain, n/v, severe back pain, BUE rash x 1 month, BLE plegia and T12 sensory level.       Post-Op Info:  * No surgery found *         Interval History:   9/30: NAEON. Awaiting spinal angio. Clinically stable.     Medications:  Continuous Infusions:  Scheduled Meds:   atorvastatin  40 mg Oral Daily    famotidine  20 mg Oral BID    gabapentin  800 mg Oral TID    mupirocin   Nasal BID     PRN Meds:diazePAM, hydrALAZINE, morphine, ondansetron, oxyCODONE, oxyCODONE, simethicone     Review of Systems  Objective:     Weight: 91.6 kg (201 lb 15.1 oz)  Body mass index is 34.66 kg/m².  Vital Signs (Most Recent):  Temp: 98.5  "°F (36.9 °C) (09/30/23 0701)  Pulse: 76 (09/30/23 0701)  Resp: (!) 23 (09/30/23 0701)  BP: 106/64 (09/30/23 0701)  SpO2: (!) 93 % (09/30/23 0701) Vital Signs (24h Range):  Temp:  [98.5 °F (36.9 °C)-99.7 °F (37.6 °C)] 98.5 °F (36.9 °C)  Pulse:  [68-88] 76  Resp:  [15-28] 23  SpO2:  [91 %-96 %] 93 %  BP: (106-163)/(58-88) 106/64     Date 09/30/23 0700 - 10/01/23 0659   Shift 9993-4466 4669-8714 0564-5299 24 Hour Total   INTAKE   P.O. 0   0   NG/GT 0   0   Shift Total(mL/kg) 0(0)   0(0)   OUTPUT   Stool 0   0   Shift Total(mL/kg) 0(0)   0(0)   Weight (kg) 91.6 91.6 91.6 91.6                            Rectal Tube 09/26/23 1603 (Active)   Balloon Inflation Volume (mL) 45 09/30/23 0701   Reposition drainage bags for BMS & Hernandez on opposite sides of bed 09/30/23 0701   Outcome no results 09/30/23 0701   Stool Color Brown 09/28/23 1700   Insertion Site Appearance no redness, warmth, tenderness, skin breakdown, drainage 09/30/23 0701   Flush/Irrigation flushed w/;water;no resistance met 09/30/23 0701   Intake (mL) 0 mL 09/30/23 0701   Rectal Tube Output 0 mL 09/30/23 0701          Physical Exam         Neurosurgery Physical Exam    Constitutional: She appears well-developed and well-nourished.      Eyes: Pupils are equal, round, and reactive to light.      Cardiovascular: Normal pulses.      Abdominal: Soft.      Psych/Behavior: She is alert. She is oriented to person, place, and time.      Musculoskeletal:        Neck: Range of motion is full.      Pulm Comf on RA        Neuro Exam: 0/5 in BLE; 5/5 in BUE; Patchy T12 sensory level; no rectal tone, no spasticity            Significant Labs:  Recent Labs   Lab 09/29/23  0124 09/30/23  0423   * 140*   * 130*   K 4.2 4.3    102   CO2 24 23   BUN 12 12   CREATININE 0.7 0.7   CALCIUM 8.3* 8.3*   MG 2.0 1.9     Recent Labs   Lab 09/29/23  0124 09/30/23  0423   WBC 7.02 6.99   HGB 12.7 12.0   HCT 35.9* 35.6*    244     No results for input(s): "LABPT", " ""INR", "APTT" in the last 48 hours.  Microbiology Results (last 7 days)       Procedure Component Value Units Date/Time    Blood culture [[id number]] Collected: 09/25/23 1449    Order Status: Completed Specimen: Blood from Peripheral, Antecubital, Right Updated: 09/29/23 1812     Blood Culture, Routine No Growth to date      No Growth to date      No Growth to date      No Growth to date      No Growth to date    Blood culture [[id number]] Collected: 09/25/23 1450    Order Status: Completed Specimen: Blood from Peripheral, Hand, Left Updated: 09/29/23 1812     Blood Culture, Routine No Growth to date      No Growth to date      No Growth to date      No Growth to date      No Growth to date    Urine culture [[id number]] Collected: 09/25/23 1519    Order Status: Completed Specimen: Urine Updated: 09/26/23 2006     Urine Culture, Routine No growth    Narrative:      Specimen Source->Urine    Culture, Respiratory with Gram Stain [[id number]]     Order Status: No result Specimen: Respiratory           All pertinent labs from the last 24 hours have been reviewed.    Significant Diagnostics:  I have reviewed all pertinent imaging results/findings within the past 24 hours.    Assessment/Plan:     * Acute transverse myelitis  68yo F PMHx GERD, HTN, smoker, depression presenting with bilateral lower extremity plegia that began after heavy lifting on 9/18/23. OSH radiology report for MRI saying cord edema from T9-L1, no disc sent with patient.     MRI Brain w/wo 9/25: patchy periventricular white matter T2/flair hyperintensities demylination vs microvascular disease  MRI C/T/L spine 9/25: Abnormal cord edema from T9 to conus  MRI spine demyelinating/MRA: possible diffusion restriction in distal thoracic spine/conus, non diagnostic MRA    --Patient admitted to [facility]   -[facility] neurochecks in ICU  --All labs and diagnostics reviewed  --Cord findings suspicious for mass vs ischemia vs demyelinating disorder  --Spinal angio to evaluate " for possible cord ischemia when able.   --Recommend LP to evaluate for demyelinating disorder   -- pt refusing LP  --MAP goals >85 at this time   --Bladder scans q6h with I&O cath   --on ASA and plavix for recent NSTEMI - hold in acute setting   --Neurosurgery following            Vincenzo Gaming MD  Neurosurgery  [facility]

## 2023-10-01 LAB
ALBUMIN SERPL BCP-MCNC: 2.2 G/DL (ref 3.5–5.2)
ALP SERPL-CCNC: 165 U/L (ref 55–135)
ALT SERPL W/O P-5'-P-CCNC: 73 U/L (ref 10–44)
ANION GAP SERPL CALC-SCNC: 5 MMOL/L (ref 8–16)
AST SERPL-CCNC: 46 U/L (ref 10–40)
BASOPHILS # BLD AUTO: 0.05 K/UL (ref 0–0.2)
BASOPHILS NFR BLD: 0.9 % (ref 0–1.9)
BILIRUB SERPL-MCNC: 0.4 MG/DL (ref 0.1–1)
BUN SERPL-MCNC: 10 MG/DL (ref 8–23)
CALCIUM SERPL-MCNC: 8.7 MG/DL (ref 8.7–10.5)
CHLORIDE SERPL-SCNC: 104 MMOL/L (ref 95–110)
CO2 SERPL-SCNC: 24 MMOL/L (ref 23–29)
CREAT SERPL-MCNC: 0.7 MG/DL (ref 0.5–1.4)
DIFFERENTIAL METHOD: ABNORMAL
EOSINOPHIL # BLD AUTO: 0.1 K/UL (ref 0–0.5)
EOSINOPHIL NFR BLD: 2.2 % (ref 0–8)
ERYTHROCYTE [DISTWIDTH] IN BLOOD BY AUTOMATED COUNT: 12 % (ref 11.5–14.5)
EST. GFR  (NO RACE VARIABLE): >60 ML/MIN/1.73 M^2
GLUCOSE SERPL-MCNC: 149 MG/DL (ref 70–110)
HCT VFR BLD AUTO: 35.3 % (ref 37–48.5)
HGB BLD-MCNC: 12.2 G/DL (ref 12–16)
IMM GRANULOCYTES # BLD AUTO: 0.06 K/UL (ref 0–0.04)
IMM GRANULOCYTES NFR BLD AUTO: 1.1 % (ref 0–0.5)
LYMPHOCYTES # BLD AUTO: 1.4 K/UL (ref 1–4.8)
LYMPHOCYTES NFR BLD: 26.8 % (ref 18–48)
MAGNESIUM SERPL-MCNC: 2 MG/DL (ref 1.6–2.6)
MCH RBC QN AUTO: 32.4 PG (ref 27–31)
MCHC RBC AUTO-ENTMCNC: 34.6 G/DL (ref 32–36)
MCV RBC AUTO: 94 FL (ref 82–98)
MONOCYTES # BLD AUTO: 0.5 K/UL (ref 0.3–1)
MONOCYTES NFR BLD: 9.7 % (ref 4–15)
NEUTROPHILS # BLD AUTO: 3.2 K/UL (ref 1.8–7.7)
NEUTROPHILS NFR BLD: 59.3 % (ref 38–73)
NRBC BLD-RTO: 0 /100 WBC
PHOSPHATE SERPL-MCNC: 3.4 MG/DL (ref 2.7–4.5)
PLATELET # BLD AUTO: 258 K/UL (ref 150–450)
PLATELET BLD QL SMEAR: ABNORMAL
PMV BLD AUTO: 9.7 FL (ref 9.2–12.9)
POTASSIUM SERPL-SCNC: 4.5 MMOL/L (ref 3.5–5.1)
PROT SERPL-MCNC: 5.8 G/DL (ref 6–8.4)
RBC # BLD AUTO: 3.77 M/UL (ref 4–5.4)
SODIUM SERPL-SCNC: 133 MMOL/L (ref 136–145)
WBC # BLD AUTO: 5.37 K/UL (ref 3.9–12.7)

## 2023-10-01 PROCEDURE — 80053 COMPREHEN METABOLIC PANEL: CPT | Performed by: NURSE PRACTITIONER

## 2023-10-01 PROCEDURE — 94761 N-INVAS EAR/PLS OXIMETRY MLT: CPT

## 2023-10-01 PROCEDURE — 99291 CRITICAL CARE FIRST HOUR: CPT | Mod: ,,, | Performed by: NURSE PRACTITIONER

## 2023-10-01 PROCEDURE — 63600175 PHARM REV CODE 636 W HCPCS: Performed by: NURSE PRACTITIONER

## 2023-10-01 PROCEDURE — 25000003 PHARM REV CODE 250: Performed by: NURSE PRACTITIONER

## 2023-10-01 PROCEDURE — 83735 ASSAY OF MAGNESIUM: CPT | Performed by: NURSE PRACTITIONER

## 2023-10-01 PROCEDURE — 11000001 HC ACUTE MED/SURG PRIVATE ROOM

## 2023-10-01 PROCEDURE — 51798 US URINE CAPACITY MEASURE: CPT

## 2023-10-01 PROCEDURE — 51701 INSERT BLADDER CATHETER: CPT

## 2023-10-01 PROCEDURE — 85025 COMPLETE CBC W/AUTO DIFF WBC: CPT | Performed by: NURSE PRACTITIONER

## 2023-10-01 PROCEDURE — 84100 ASSAY OF PHOSPHORUS: CPT | Performed by: NURSE PRACTITIONER

## 2023-10-01 PROCEDURE — 99291 PR CRITICAL CARE, E/M 30-74 MINUTES: ICD-10-PCS | Mod: ,,, | Performed by: NURSE PRACTITIONER

## 2023-10-01 PROCEDURE — 21400001 HC TELEMETRY ROOM

## 2023-10-01 RX ORDER — POLYETHYLENE GLYCOL 3350 17 G/17G
17 POWDER, FOR SOLUTION ORAL DAILY
Status: DISCONTINUED | OUTPATIENT
Start: 2023-10-01 | End: 2023-10-11 | Stop reason: HOSPADM

## 2023-10-01 RX ORDER — FAMOTIDINE 20 MG/1
20 TABLET, FILM COATED ORAL 2 TIMES DAILY
Status: DISCONTINUED | OUTPATIENT
Start: 2023-10-01 | End: 2023-10-11

## 2023-10-01 RX ADMIN — GABAPENTIN 800 MG: 400 CAPSULE ORAL at 02:10

## 2023-10-01 RX ADMIN — OXYCODONE HYDROCHLORIDE 5 MG: 5 TABLET ORAL at 04:10

## 2023-10-01 RX ADMIN — HEPARIN SODIUM 5000 UNITS: 5000 INJECTION INTRAVENOUS; SUBCUTANEOUS at 02:10

## 2023-10-01 RX ADMIN — OXYCODONE HYDROCHLORIDE 10 MG: 10 TABLET ORAL at 08:10

## 2023-10-01 RX ADMIN — GABAPENTIN 800 MG: 400 CAPSULE ORAL at 08:10

## 2023-10-01 RX ADMIN — HEPARIN SODIUM 5000 UNITS: 5000 INJECTION INTRAVENOUS; SUBCUTANEOUS at 06:10

## 2023-10-01 RX ADMIN — POLYETHYLENE GLYCOL 3350 17 G: 17 POWDER, FOR SOLUTION ORAL at 08:10

## 2023-10-01 RX ADMIN — ATORVASTATIN CALCIUM 20 MG: 20 TABLET, FILM COATED ORAL at 08:10

## 2023-10-01 RX ADMIN — OXYCODONE HYDROCHLORIDE 10 MG: 10 TABLET ORAL at 02:10

## 2023-10-01 RX ADMIN — FAMOTIDINE 20 MG: 20 TABLET ORAL at 11:10

## 2023-10-01 RX ADMIN — FAMOTIDINE 20 MG: 20 TABLET ORAL at 08:10

## 2023-10-01 RX ADMIN — SIMETHICONE 80 MG: 80 TABLET, CHEWABLE ORAL at 08:10

## 2023-10-01 RX ADMIN — HEPARIN SODIUM 5000 UNITS: 5000 INJECTION INTRAVENOUS; SUBCUTANEOUS at 09:10

## 2023-10-01 NOTE — PROGRESS NOTES
[facility]  Neurosurgery  Progress Note    Subjective:     History of Present Illness: Rhina Forrest is a 66yo F PMHx GERD, HTN, smoker, depression presenting with bilateral lower extremity plegia that began after heavy lifting on 9/18/23. Pt walks with a walker at baseline and lives at home alone. On 9/18 she was walking home with groceries on her walker and lifted the walker over a curb and hurt her back. Pt was able to walk home and put her groceries away. Once she sat on the couch, she was unable to get back up as her legs stopped moving. She also had decreased sensation from T12 down. Pt called 911 and was taken via EMS to [facility]. MRI cervical and thoracic spine revealed stenosis and T9-L1 cord edema with mild enhancement suspicious for evolving cord infarct vs demyelinating process per radiology report (no imaging was sent with patient). Pt was found to have NSTEMI with troponin 1.1 and was started on ASA and plavix. Pt also c/o bl upper quadrant abdominal pain and CT abdomen pelvis revealed colitis. She was started on cipro and flagyl. Pt has had a barber since 9/18 and has not had any bowel movements since 9/18 despite aggressive bowel regimen at OSH. Transferred to [facility] [place]surgical evaluation and further workup. Pt currently c/o severe abdominal pain, n/v, severe back pain, BUE rash x 1 month, BLE plegia and T12 sensory level.       Post-Op Info:  * No surgery found *         Interval History: 10/1: NAEON. DSA Monday.     Medications:  Continuous Infusions:  Scheduled Meds:   atorvastatin  20 mg Oral Daily    gabapentin  800 mg Oral TID    heparin (porcine)  5,000 Units Subcutaneous Q8H    polyethylene glycol  17 g Oral Daily     PRN Meds:diazePAM, ondansetron, oxyCODONE, oxyCODONE, simethicone     Review of Systems  Objective:     Weight: 91.6 kg (201 lb 15.1 oz)  Body mass index is 34.66 kg/m².  Vital Signs (Most Recent):  Temp: 99 °F (37.2 °C)  "(10/01/23 0701)  Pulse: 79 (10/01/23 0801)  Resp: 16 (10/01/23 0815)  BP: 139/63 (10/01/23 0801)  SpO2: 95 % (10/01/23 0801) Vital Signs (24h Range):  Temp:  [98.5 °F (36.9 °C)-99.5 °F (37.5 °C)] 99 °F (37.2 °C)  Pulse:  [66-92] 79  Resp:  [15-45] 16  SpO2:  [92 %-97 %] 95 %  BP: (113-151)/(57-73) 139/63     Date 10/01/23 0700 - 10/02/23 0659   Shift 4699-5623 6602-8077 2933-7834 24 Hour Total   INTAKE   P.O. 240   240   Shift Total(mL/kg) 240(2.6)   240(2.6)   OUTPUT   Shift Total(mL/kg)       Weight (kg) 91.6 91.6 91.6 91.6                            Rectal Tube 09/26/23 1603 (Active)   Balloon Inflation Volume (mL) 45 10/01/23 0303   Reposition drainage bags for BMS & Hernandez on opposite sides of bed 10/01/23 0701   Outcome gas expelled, stool evacuated 10/01/23 0701   Stool Color Brown 10/01/23 0701   Insertion Site Appearance no redness, warmth, tenderness, skin breakdown, drainage 10/01/23 0701   Flush/Irrigation flushed w/;water;no resistance met 09/30/23 1701   Intake (mL) 0 mL 09/30/23 1801   Rectal Tube Output 100 mL 09/30/23 1801          Physical Exam     Neurosurgery Physical Exam    Constitutional: She appears well-developed and well-nourished.      Eyes: Pupils are equal, round, and reactive to light.      Cardiovascular: Normal pulses.      Abdominal: Soft.      Psych/Behavior: She is alert. She is oriented to person, place, and time.      Musculoskeletal:        Neck: Range of motion is full.      Pulm Comf on RA        Neuro Exam: 0/5 in BLE; 5/5 in BUE; Patchy T12 sensory level; no rectal tone, no spasticity                 Significant Labs:  Recent Labs   Lab 09/30/23  0423 10/01/23  0446   * 149*   * 133*   K 4.3 4.5    104   CO2 23 24   BUN 12 10   CREATININE 0.7 0.7   CALCIUM 8.3* 8.7   MG 1.9 2.0     Recent Labs   Lab 09/30/23  0423 10/01/23  0446   WBC 6.99 5.37   HGB 12.0 12.2   HCT 35.6* 35.3*    258     No results for input(s): "LABPT", "INR", "APTT" in the last 48 " hours.  Microbiology Results (last 7 days)       Procedure Component Value Units Date/Time    Blood culture [[id number]] Collected: 09/25/23 1450    Order Status: Completed Specimen: Blood from Peripheral, Hand, Left Updated: 09/30/23 1812     Blood Culture, Routine No growth after 5 days.    Blood culture [[id number]] Collected: 09/25/23 1449    Order Status: Completed Specimen: Blood from Peripheral, Antecubital, Right Updated: 09/30/23 1812     Blood Culture, Routine No growth after 5 days.    Urine culture [[id number]] Collected: 09/25/23 1519    Order Status: Completed Specimen: Urine Updated: 09/26/23 2006     Urine Culture, Routine No growth    Narrative:      Specimen Source->Urine    Culture, Respiratory with Gram Stain [[id number]]     Order Status: No result Specimen: Respiratory           All pertinent labs from the last 24 hours have been reviewed.    Significant Diagnostics:  I have reviewed all pertinent imaging results/findings within the past 24 hours.    Assessment/Plan:     * Acute transverse myelitis  68yo F PMHx GERD, HTN, smoker, depression presenting with bilateral lower extremity plegia that began after heavy lifting on 9/18/23. OSH radiology report for MRI saying cord edema from T9-L1, no disc sent with patient.     MRI Brain w/wo 9/25: patchy periventricular white matter T2/flair hyperintensities demylination vs microvascular disease  MRI C/T/L spine 9/25: Abnormal cord edema from T9 to conus  MRI spine demyelinating/MRA: possible diffusion restriction in distal thoracic spine/conus, non diagnostic MRA    --Patient admitted to [facility]   -q1h neurochecks in ICU  --All labs and diagnostics reviewed  --Cord findings suspicious for mass vs ischemia vs demyelinating disorder  --Spinal angio to evaluate for possible cord ischemia when able.   --Recommend LP to evaluate for demyelinating disorder   -- pt refused LP  --MAP goals >85 at this time   --Bladder scans q6h with I&O cath   --on ASA and plavix  for recent NSTEMI - hold in acute setting   --Neurosurgery following            Vincenzo Gaming MD  Neurosurgery  [facility]

## 2023-10-01 NOTE — PROGRESS NOTES
[facility] - Neuro Critical Care  Neurocritical Care  Progress Note    Admit Date: 9/25/2023  Service Date: 10/01/2023  Length of Stay: 6    Subjective:     Chief Complaint: Acute transverse myelitis    History of Present Illness: Rhina Forrest is a 66yo F PMHx GERD, HTN, smoker, depression presenting with bilateral lower extremity paresis and paresthesia that began after heavy lifting on 9/18/23. Pt walks with a walker at baseline and lives at home alone. On 9/18 she was walking home with groceries on her walker and lifted the walker over a curb and hurt her back. Pt was able to walk home and put her groceries away. Once she sat on the couch, she was unable to get back up as her legs stopped moving. She also had decreased sensation from T12 down. Pt called 911 and was taken via EMS to [facility]. MRI cervical and thoracic spine revealed stenosis and T9-L1 cord edema with mild enhancement suspicious for evolving cord infarct vs demyelinating process. Pt was found to have NSTEMI with troponin 1.1 and was started on ASA and plavix. Pt also c/o bl upper quadrant abdominal pain and CT abdomen pelvis revealed colitis. She was started on cipro and flagyl. Pt has had a barber since 9/18 and has not had any bowel movements since 9/18 despite aggressive bowel regimen at OSH. Transferred to [facility] for neurosurgical evaluation and further workup. Pt currently c/o severe abdominal pain, n/v, severe back pain, BUE rash x 1 month, BLE paresis and paresthesia. Directly admitted to [facility] for higher level of care.       Hospital Course: 09/26/2023 naeon, improved abdominal pain w bowel movement, no change in exam  09/27/2023: NAEON. Antibiotics stopped. Pain control regimen adjusted. Patient refusing spinal angio today, will communicate with Neuro IR to see if she can be moved till tomorrow.   09/28/2023 continued pain control issues, awaiting further diagnostic davis  9/30/2023- NAEON, added sq heparin, decrease statin  d/t mild transaminitis  10/1/23: awaiting step down bed      Interval History:  trach /peg 10/2/23:    Review of Systems   Constitutional: Negative.    HENT: Negative.     Eyes: Negative.    Respiratory: Negative.     Cardiovascular: Negative.    Gastrointestinal: Negative.    Endocrine: Negative.    Genitourinary: Negative.    Musculoskeletal: Negative.    Skin: Negative.    Neurological: Negative.       Unable to obtain a complete ROS due to level of consciousness.  Objective:     Vitals:  Temp: 97.6 °F (36.4 °C)  Pulse: 70  Rhythm: normal sinus rhythm  BP: (!) 120/56  MAP (mmHg): 81  Resp: (!) 23  SpO2: (!) 94 %    Temp  Min: 97.6 °F (36.4 °C)  Max: 99.3 °F (37.4 °C)  Pulse  Min: 66  Max: 92  BP  Min: 113/62  Max: 151/68  MAP (mmHg)  Min: 81  Max: 98  Resp  Min: 15  Max: 33  SpO2  Min: 92 %  Max: 97 %    09/30 0701 - 10/01 0700  In: 1320 [P.O.:1300]  Out: 2075 [Urine:1975]   Unmeasured Output  Stool Occurrence: 1        Physical Exam  Vitals and nursing note reviewed.   Constitutional:       Appearance: Normal appearance.   HENT:      Head: Normocephalic.      Nose: Nose normal.      Mouth/Throat:      Mouth: Mucous membranes are moist.      Pharynx: Oropharynx is clear.   Eyes:      Pupils: Pupils are equal, round, and reactive to light.   Cardiovascular:      Rate and Rhythm: Normal rate and regular rhythm.      Pulses: Normal pulses.      Heart sounds: Normal heart sounds.   Pulmonary:      Effort: Pulmonary effort is normal.      Breath sounds: Normal breath sounds.   Abdominal:      General: Bowel sounds are normal.      Palpations: Abdomen is soft.   Musculoskeletal:         General: Normal range of motion.   Skin:     General: Skin is warm and dry.      Capillary Refill: Capillary refill takes 2 to 3 seconds.   Neurological:      Comments: --GCS: E4 V5 M6  --Mental Status:  awake, follows commands  --CN II-XII grossly intact.   --Pupils 3mm, PERRL.   --Corneal reflex, gag, cough intact.  --BUE spont  --BLE  plegic            Medications:  Continuous Scheduledatorvastatin, 20 mg, Daily  famotidine, 20 mg, BID  gabapentin, 800 mg, TID  heparin (porcine), 5,000 Units, Q8H  polyethylene glycol, 17 g, Daily    PRNdiazePAM, 5 mg, Q4H PRN  ondansetron, 4 mg, Q8H PRN  oxyCODONE, 5 mg, Q4H PRN  oxyCODONE, 10 mg, Q4H PRN  simethicone, 1 tablet, TID PRN      Today I personally reviewed pertinent medications, lines/drains/airways, imaging, cardiology results, laboratory results, microbiology results, notably:    Diet  Diet Adult Regular (IDDSI Level 7) Thin; Standard Tray  Diet Adult Regular (IDDSI Level 7) Thin; Standard Tray          Assessment/Plan:     Neuro  * Acute transverse myelitis  66yo F PMHx GERD, HTN, smoker, depression presenting with bilateral lower extremity paresis and paresthesia that began after heavy lifting on 23. OSH MRI cervical and thoracic spine revealed stenosis and T9-L1 central cord edema with mild enhancement. MRA spine w DWI inconclusive    Sudden onset with valsalva and evolution over 3 hours most suspicious for a vascular lesion, ddx includes demyelination and infectious processes     -q4 neuro checks, sleep holiday  -permissive htn, aggressive bowel/bladder care, aggressive pulm toilet  -multimodal pain control  -awaiting spinal angio and LP  -serum NMO negative  -pt/ot, diet, oob as able    Psychiatric  Depression  Hx of depression. 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 Pt's son committed suicide and her mother  after her son passed. Pt now lives alone and has minimal interaction with her 2 living children. Pt found to have several psychosomatic complaints at OSH related to medication. She stated she takes her home medications 1-2x per month as she is scared of the side effects.   Consider psych evaluation after acute period     GI  Colitis  Bl upper quadrant abdominal pain and CT abdomen pelvis revealed colitis. She was started on cipro and flagyl. Has not  had any bowel movements since 9/18 despite aggressive bowel regimen at OSH.   No bowel inflammation on CT performed today  Improved symptoms w bowel movement    -DC abx  -Bowel regimen stopped due to diarrhea    GERD (gastroesophageal reflux disease)  Famotidine     Internal Hemorrhoids  Sung blood noted after rectal exam  CTM          The patient is being Prophylaxed for:  Venous Thromboembolism with: Mechanical or Chemical  Stress Ulcer with: H2B  Ventilator Pneumonia with: chlorhexidine oral care    Activity Orders            Diet Adult Regular (IDDSI Level 7) Thin; Standard Tray: Regular starting at 09/29 1834    Turn patient starting at 09/25 1200    Elevate HOB starting at 09/25 1108          Full Code  Critical care time 40 mins  Clemencia Guerrero NP  Neurocritical Care  [facility] - Neuro Critical Care

## 2023-10-01 NOTE — PROGRESS NOTES
[facility] - Neuro Critical Care  Neurocritical Care  Progress Note    Admit Date: 9/25/2023  Service Date: 10/01/2023  Length of Stay: 6    Subjective:     Chief Complaint: Acute transverse myelitis    History of Present Illness: Rhina Forrest is a 66yo F PMHx GERD, HTN, smoker, depression presenting with bilateral lower extremity paresis and paresthesia that began after heavy lifting on 9/18/23. Pt walks with a walker at baseline and lives at home alone. On 9/18 she was walking home with groceries on her walker and lifted the walker over a curb and hurt her back. Pt was able to walk home and put her groceries away. Once she sat on the couch, she was unable to get back up as her legs stopped moving. She also had decreased sensation from T12 down. Pt called 911 and was taken via EMS to [facility]. MRI cervical and thoracic spine revealed stenosis and T9-L1 cord edema with mild enhancement suspicious for evolving cord infarct vs demyelinating process. Pt was found to have NSTEMI with troponin 1.1 and was started on ASA and plavix. Pt also c/o bl upper quadrant abdominal pain and CT abdomen pelvis revealed colitis. She was started on cipro and flagyl. Pt has had a barber since 9/18 and has not had any bowel movements since 9/18 despite aggressive bowel regimen at OSH. Transferred to [facility] for neurosurgical evaluation and further workup. Pt currently c/o severe abdominal pain, n/v, severe back pain, BUE rash x 1 month, BLE paresis and paresthesia. Directly admitted to [facility] for higher level of care.       Hospital Course: 09/26/2023 naeon, improved abdominal pain w bowel movement, no change in exam  09/27/2023: NAEON. Antibiotics stopped. Pain control regimen adjusted. Patient refusing spinal angio today, will communicate with Neuro IR to see if she can be moved till tomorrow.   09/28/2023 continued pain control issues, awaiting further diagnostic davis  9/30/2023- NAEON, added sq heparin, decrease statin  d/t mild transaminitis  10/1/23: possible trach/peg 10/2/23      No new subjective & objective note has been filed under this hospital service since the last note was generated.    Assessment/Plan:     No new Assessment & Plan notes have been filed under this hospital service since the last note was generated.  Service: Neuro Critical Care        The patient is being Prophylaxed for:  Venous Thromboembolism with: Mechanical or Chemical  Stress Ulcer with: H2B  Ventilator Pneumonia with: chlorhexidine oral care    Activity Orders          Diet Adult Regular (IDDSI Level 7) Thin; Standard Tray: Regular starting at 09/29 1834    Turn patient starting at 09/25 1200    Elevate HOB starting at 09/25 1108        Full Code  Critical care time 40  mins  Clemencia Guerrero NP  Neurocritical Care  [facility] - Neuro Critical Care

## 2023-10-01 NOTE — ASSESSMENT & PLAN NOTE
68yo F PMHx GERD, HTN, smoker, depression presenting with bilateral lower extremity plegia that began after heavy lifting on 9/18/23. OSH radiology report for MRI saying cord edema from T9-L1, no disc sent with patient.     MRI Brain w/wo 9/25: patchy periventricular white matter T2/flair hyperintensities demylination vs microvascular disease  MRI C/T/L spine 9/25: Abnormal cord edema from T9 to conus  MRI spine demyelinating/MRA: possible diffusion restriction in distal thoracic spine/conus, non diagnostic MRA    --Patient admitted to [facility]   -q1h neurochecks in ICU  --All labs and diagnostics reviewed  --Cord findings suspicious for mass vs ischemia vs demyelinating disorder  --Spinal angio to evaluate for possible cord ischemia when able.   --Recommend LP to evaluate for demyelinating disorder   -- pt refused LP  --MAP goals >85 at this time   --Bladder scans q6h with I&O cath   --on ASA and plavix for recent NSTEMI - hold in acute setting   --Neurosurgery following

## 2023-10-01 NOTE — PLAN OF CARE
POC reviewed with Rhina Forrest and family at 1400. Pt verbalized understanding. Questions and concerns addressed. No acute events today. Pt progressing toward goals. Will continue to monitor. See below and flowsheets for full assessment and VS info.     - Pepcid added back to daily med regimen.   - Miralax added.   - Gas-x given x1.   - Oxycodone 5 mg given x1.   - Oxycodone 10 mg given x2.   - Bladder scan and straight cathed as needed.   - Still awaiting bed on step down unit.   - Pt to be NPO at midnight for scheduled spinal angiogram 10/02/2023.     Neuro:  Jerilyn Coma Scale  Best Eye Response: 4-->(E4) spontaneous  Best Motor Response: 6-->(M6) obeys commands  Best Verbal Response: 5-->(V5) oriented  [place] Coma Scale Score: 15  Assessment Qualifiers: patient not sedated/intubated, no eye obstruction present  Pupil PERRLA: yes     24 hr Temp:  [97.6 °F (36.4 °C)-99 °F (37.2 °C)]     CV:   Rhythm: normal sinus rhythm  BP goals:   SBP <  200  MAP > 85    Resp:           Plan: N/A    GI/:     Diet/Nutrition Received: regular  Last Bowel Movement: 10/01/23  Voiding Characteristics: other (see comments) (pt bladder scanned and straight cathed Q 6)    Intake/Output Summary (Last 24 hours) at 10/1/2023 1727  Last data filed at 10/1/2023 1701  Gross per 24 hour   Intake 1140 ml   Output 1525 ml   Net -385 ml     Unmeasured Output  Stool Occurrence: 1    Labs/Accuchecks:  Recent Labs   Lab 10/01/23  0446   WBC 5.37   RBC 3.77*   HGB 12.2   HCT 35.3*         Recent Labs   Lab 10/01/23  0446   *   K 4.5   CO2 24      BUN 10   CREATININE 0.7   ALKPHOS 165*   ALT 73*   AST 46*   BILITOT 0.4      Recent Labs   Lab 09/25/23  1136   INR 1.1   APTT 22.7      Recent Labs   Lab 09/25/23  1453   TROPONINI 0.011       Electrolytes: N/A - electrolytes WDL  Accuchecks: none    Gtts:      LDA/Wounds:  Lines/Drains/Airways       Drain  Duration                  Rectal Tube 09/26/23 1603 5 days               Peripheral Intravenous Line  Duration                  Peripheral IV - Single Lumen 09/30/23 1612 22 G Left;Posterior Hand 1 day         Peripheral IV - Single Lumen 10/01/23 0637 20 G Anterior;Right Upper Arm <1 day                  Wounds: Yes  Wound care consulted: Yes    Is this a stroke patient? No.       Problem: Adult Inpatient Plan of Care  Goal: Plan of Care Review  Outcome: Ongoing, Progressing     Problem: Adult Inpatient Plan of Care  Goal: Optimal Comfort and Wellbeing  Outcome: Ongoing, Progressing     Problem: Adult Inpatient Plan of Care  Goal: Readiness for Transition of Care  Outcome: Ongoing, Progressing     Problem: Sensorimotor Impairment (Stroke, Hemorrhagic)  Goal: Improved Sensorimotor Function  Outcome: Ongoing, Progressing     Problem: Urinary Elimination Impaired (Stroke, Hemorrhagic)  Goal: Effective Urinary Elimination  Outcome: Ongoing, Progressing     Problem: Impaired Wound Healing  Goal: Optimal Wound Healing  Outcome: Ongoing, Progressing

## 2023-10-01 NOTE — SUBJECTIVE & OBJECTIVE
Interval History: 10/1: WOJCIECH. DSA Monday.     Medications:  Continuous Infusions:  Scheduled Meds:   atorvastatin  20 mg Oral Daily    gabapentin  800 mg Oral TID    heparin (porcine)  5,000 Units Subcutaneous Q8H    polyethylene glycol  17 g Oral Daily     PRN Meds:diazePAM, ondansetron, oxyCODONE, oxyCODONE, simethicone     Review of Systems  Objective:     Weight: 91.6 kg (201 lb 15.1 oz)  Body mass index is 34.66 kg/m².  Vital Signs (Most Recent):  Temp: 99 °F (37.2 °C) (10/01/23 0701)  Pulse: 79 (10/01/23 0801)  Resp: 16 (10/01/23 0815)  BP: 139/63 (10/01/23 0801)  SpO2: 95 % (10/01/23 0801) Vital Signs (24h Range):  Temp:  [98.5 °F (36.9 °C)-99.5 °F (37.5 °C)] 99 °F (37.2 °C)  Pulse:  [66-92] 79  Resp:  [15-45] 16  SpO2:  [92 %-97 %] 95 %  BP: (113-151)/(57-73) 139/63     Date 10/01/23 0700 - 10/02/23 0659   Shift 7871-1487 8567-9786 2158-1594 24 Hour Total   INTAKE   P.O. 240   240   Shift Total(mL/kg) 240(2.6)   240(2.6)   OUTPUT   Shift Total(mL/kg)       Weight (kg) 91.6 91.6 91.6 91.6                            Rectal Tube 09/26/23 1603 (Active)   Balloon Inflation Volume (mL) 45 10/01/23 0303   Reposition drainage bags for BMS & Hernandez on opposite sides of bed 10/01/23 0701   Outcome gas expelled, stool evacuated 10/01/23 0701   Stool Color Brown 10/01/23 0701   Insertion Site Appearance no redness, warmth, tenderness, skin breakdown, drainage 10/01/23 0701   Flush/Irrigation flushed w/;water;no resistance met 09/30/23 1701   Intake (mL) 0 mL 09/30/23 1801   Rectal Tube Output 100 mL 09/30/23 1801          Physical Exam     Neurosurgery Physical Exam    Constitutional: She appears well-developed and well-nourished.      Eyes: Pupils are equal, round, and reactive to light.      Cardiovascular: Normal pulses.      Abdominal: Soft.      Psych/Behavior: She is alert. She is oriented to person, place, and time.      Musculoskeletal:        Neck: Range of motion is full.      Pulm Comf on RA        Neuro  "Exam: 0/5 in BLE; 5/5 in BUE; Patchy T12 sensory level; no rectal tone, no spasticity                 Significant Labs:  Recent Labs   Lab 09/30/23  0423 10/01/23  0446   * 149*   * 133*   K 4.3 4.5    104   CO2 23 24   BUN 12 10   CREATININE 0.7 0.7   CALCIUM 8.3* 8.7   MG 1.9 2.0     Recent Labs   Lab 09/30/23  0423 10/01/23  0446   WBC 6.99 5.37   HGB 12.0 12.2   HCT 35.6* 35.3*    258     No results for input(s): "LABPT", "INR", "APTT" in the last 48 hours.  Microbiology Results (last 7 days)       Procedure Component Value Units Date/Time    Blood culture [[id number]] Collected: 09/25/23 1450    Order Status: Completed Specimen: Blood from Peripheral, Hand, Left Updated: 09/30/23 1812     Blood Culture, Routine No growth after 5 days.    Blood culture [[id number]] Collected: 09/25/23 1449    Order Status: Completed Specimen: Blood from Peripheral, Antecubital, Right Updated: 09/30/23 1812     Blood Culture, Routine No growth after 5 days.    Urine culture [[id number]] Collected: 09/25/23 1519    Order Status: Completed Specimen: Urine Updated: 09/26/23 2006     Urine Culture, Routine No growth    Narrative:      Specimen Source->Urine    Culture, Respiratory with Gram Stain [[id number]]     Order Status: No result Specimen: Respiratory           All pertinent labs from the last 24 hours have been reviewed.    Significant Diagnostics:  I have reviewed all pertinent imaging results/findings within the past 24 hours.  "

## 2023-10-01 NOTE — SUBJECTIVE & OBJECTIVE
Interval History:  trach /peg 10/2/23:    Review of Systems   Constitutional: Negative.    HENT: Negative.     Eyes: Negative.    Respiratory: Negative.     Cardiovascular: Negative.    Gastrointestinal: Negative.    Endocrine: Negative.    Genitourinary: Negative.    Musculoskeletal: Negative.    Skin: Negative.    Neurological: Negative.       Unable to obtain a complete ROS due to level of consciousness.  Objective:     Vitals:  Temp: 97.6 °F (36.4 °C)  Pulse: 70  Rhythm: normal sinus rhythm  BP: (!) 120/56  MAP (mmHg): 81  Resp: (!) 23  SpO2: (!) 94 %    Temp  Min: 97.6 °F (36.4 °C)  Max: 99.3 °F (37.4 °C)  Pulse  Min: 66  Max: 92  BP  Min: 113/62  Max: 151/68  MAP (mmHg)  Min: 81  Max: 98  Resp  Min: 15  Max: 33  SpO2  Min: 92 %  Max: 97 %    09/30 0701 - 10/01 0700  In: 1320 [P.O.:1300]  Out: 2075 [Urine:1975]   Unmeasured Output  Stool Occurrence: 1        Physical Exam  Vitals and nursing note reviewed.   Constitutional:       Appearance: Normal appearance.   HENT:      Head: Normocephalic.      Nose: Nose normal.      Mouth/Throat:      Mouth: Mucous membranes are moist.      Pharynx: Oropharynx is clear.   Eyes:      Pupils: Pupils are equal, round, and reactive to light.   Cardiovascular:      Rate and Rhythm: Normal rate and regular rhythm.      Pulses: Normal pulses.      Heart sounds: Normal heart sounds.   Pulmonary:      Effort: Pulmonary effort is normal.      Breath sounds: Normal breath sounds.   Abdominal:      General: Bowel sounds are normal.      Palpations: Abdomen is soft.   Musculoskeletal:         General: Normal range of motion.   Skin:     General: Skin is warm and dry.      Capillary Refill: Capillary refill takes 2 to 3 seconds.   Neurological:      Comments: --GCS: E4 V5 M6  --Mental Status:  awake, follows commands  --CN II-XII grossly intact.   --Pupils 3mm, PERRL.   --Corneal reflex, gag, cough intact.  --BUE spont  --BLE plegic            Medications:  Continuous  Scheduledatorvastatin, 20 mg, Daily  famotidine, 20 mg, BID  gabapentin, 800 mg, TID  heparin (porcine), 5,000 Units, Q8H  polyethylene glycol, 17 g, Daily    PRNdiazePAM, 5 mg, Q4H PRN  ondansetron, 4 mg, Q8H PRN  oxyCODONE, 5 mg, Q4H PRN  oxyCODONE, 10 mg, Q4H PRN  simethicone, 1 tablet, TID PRN      Today I personally reviewed pertinent medications, lines/drains/airways, imaging, cardiology results, laboratory results, microbiology results, notably:    Diet  Diet Adult Regular (IDDSI Level 7) Thin; Standard Tray  Diet Adult Regular (IDDSI Level 7) Thin; Standard Tray

## 2023-10-02 ENCOUNTER — ANESTHESIA EVENT (OUTPATIENT)
Dept: INTERVENTIONAL RADIOLOGY/VASCULAR | Facility: HOSPITAL | Age: 68
DRG: 058 | End: 2023-10-02
Payer: MEDICARE

## 2023-10-02 PROBLEM — R33.9 URINARY RETENTION: Status: ACTIVE | Noted: 2023-10-02

## 2023-10-02 LAB
ALBUMIN SERPL BCP-MCNC: 2.1 G/DL (ref 3.5–5.2)
ALP SERPL-CCNC: 137 U/L (ref 55–135)
ALT SERPL W/O P-5'-P-CCNC: 58 U/L (ref 10–44)
ANION GAP SERPL CALC-SCNC: 4 MMOL/L (ref 8–16)
AST SERPL-CCNC: 32 U/L (ref 10–40)
BASOPHILS # BLD AUTO: 0.03 K/UL (ref 0–0.2)
BASOPHILS NFR BLD: 0.7 % (ref 0–1.9)
BILIRUB SERPL-MCNC: 0.4 MG/DL (ref 0.1–1)
BUN SERPL-MCNC: 8 MG/DL (ref 8–23)
CALCIUM SERPL-MCNC: 8.9 MG/DL (ref 8.7–10.5)
CHLORIDE SERPL-SCNC: 102 MMOL/L (ref 95–110)
CO2 SERPL-SCNC: 26 MMOL/L (ref 23–29)
CREAT SERPL-MCNC: 0.6 MG/DL (ref 0.5–1.4)
DIFFERENTIAL METHOD: ABNORMAL
EOSINOPHIL # BLD AUTO: 0.2 K/UL (ref 0–0.5)
EOSINOPHIL NFR BLD: 3.3 % (ref 0–8)
ERYTHROCYTE [DISTWIDTH] IN BLOOD BY AUTOMATED COUNT: 11.9 % (ref 11.5–14.5)
EST. GFR  (NO RACE VARIABLE): >60 ML/MIN/1.73 M^2
GLUCOSE SERPL-MCNC: 215 MG/DL (ref 70–110)
HCT VFR BLD AUTO: 35.9 % (ref 37–48.5)
HGB BLD-MCNC: 11.9 G/DL (ref 12–16)
IMM GRANULOCYTES # BLD AUTO: 0.06 K/UL (ref 0–0.04)
IMM GRANULOCYTES NFR BLD AUTO: 1.3 % (ref 0–0.5)
LYMPHOCYTES # BLD AUTO: 1 K/UL (ref 1–4.8)
LYMPHOCYTES NFR BLD: 21.5 % (ref 18–48)
MAGNESIUM SERPL-MCNC: 1.8 MG/DL (ref 1.6–2.6)
MCH RBC QN AUTO: 31 PG (ref 27–31)
MCHC RBC AUTO-ENTMCNC: 33.1 G/DL (ref 32–36)
MCV RBC AUTO: 94 FL (ref 82–98)
MONOCYTES # BLD AUTO: 0.4 K/UL (ref 0.3–1)
MONOCYTES NFR BLD: 9.4 % (ref 4–15)
NEUTROPHILS # BLD AUTO: 2.9 K/UL (ref 1.8–7.7)
NEUTROPHILS NFR BLD: 63.8 % (ref 38–73)
NRBC BLD-RTO: 0 /100 WBC
PHOSPHATE SERPL-MCNC: 3.5 MG/DL (ref 2.7–4.5)
PLATELET # BLD AUTO: 248 K/UL (ref 150–450)
PMV BLD AUTO: 8.9 FL (ref 9.2–12.9)
POTASSIUM SERPL-SCNC: 4.6 MMOL/L (ref 3.5–5.1)
PROT SERPL-MCNC: 5.7 G/DL (ref 6–8.4)
RBC # BLD AUTO: 3.84 M/UL (ref 4–5.4)
SODIUM SERPL-SCNC: 132 MMOL/L (ref 136–145)
WBC # BLD AUTO: 4.56 K/UL (ref 3.9–12.7)

## 2023-10-02 PROCEDURE — 25000003 PHARM REV CODE 250: Performed by: STUDENT IN AN ORGANIZED HEALTH CARE EDUCATION/TRAINING PROGRAM

## 2023-10-02 PROCEDURE — D9220A PRA ANESTHESIA: ICD-10-PCS | Mod: ANES,,, | Performed by: SURGERY

## 2023-10-02 PROCEDURE — 25000003 PHARM REV CODE 250: Performed by: NURSE PRACTITIONER

## 2023-10-02 PROCEDURE — 63600175 PHARM REV CODE 636 W HCPCS: Performed by: STUDENT IN AN ORGANIZED HEALTH CARE EDUCATION/TRAINING PROGRAM

## 2023-10-02 PROCEDURE — 80053 COMPREHEN METABOLIC PANEL: CPT | Performed by: NURSE PRACTITIONER

## 2023-10-02 PROCEDURE — 51798 US URINE CAPACITY MEASURE: CPT

## 2023-10-02 PROCEDURE — 21400001 HC TELEMETRY ROOM

## 2023-10-02 PROCEDURE — 63600175 PHARM REV CODE 636 W HCPCS

## 2023-10-02 PROCEDURE — 84100 ASSAY OF PHOSPHORUS: CPT | Performed by: NURSE PRACTITIONER

## 2023-10-02 PROCEDURE — 99233 SBSQ HOSP IP/OBS HIGH 50: CPT | Mod: ,,, | Performed by: HOSPITALIST

## 2023-10-02 PROCEDURE — 36415 COLL VENOUS BLD VENIPUNCTURE: CPT | Performed by: NURSE PRACTITIONER

## 2023-10-02 PROCEDURE — 99233 PR SUBSEQUENT HOSPITAL CARE,LEVL III: ICD-10-PCS | Mod: ,,, | Performed by: HOSPITALIST

## 2023-10-02 PROCEDURE — 63600175 PHARM REV CODE 636 W HCPCS: Performed by: SURGERY

## 2023-10-02 PROCEDURE — 83735 ASSAY OF MAGNESIUM: CPT | Performed by: NURSE PRACTITIONER

## 2023-10-02 PROCEDURE — D9220A PRA ANESTHESIA: Mod: CRNA,,, | Performed by: STUDENT IN AN ORGANIZED HEALTH CARE EDUCATION/TRAINING PROGRAM

## 2023-10-02 PROCEDURE — 51702 INSERT TEMP BLADDER CATH: CPT

## 2023-10-02 PROCEDURE — 25000003 PHARM REV CODE 250: Performed by: PSYCHIATRY & NEUROLOGY

## 2023-10-02 PROCEDURE — 63600175 PHARM REV CODE 636 W HCPCS: Performed by: NURSE PRACTITIONER

## 2023-10-02 PROCEDURE — D9220A PRA ANESTHESIA: ICD-10-PCS | Mod: CRNA,,, | Performed by: STUDENT IN AN ORGANIZED HEALTH CARE EDUCATION/TRAINING PROGRAM

## 2023-10-02 PROCEDURE — D9220A PRA ANESTHESIA: Mod: ANES,,, | Performed by: SURGERY

## 2023-10-02 PROCEDURE — 85025 COMPLETE CBC W/AUTO DIFF WBC: CPT | Performed by: NURSE PRACTITIONER

## 2023-10-02 PROCEDURE — 51701 INSERT BLADDER CATHETER: CPT

## 2023-10-02 PROCEDURE — 94761 N-INVAS EAR/PLS OXIMETRY MLT: CPT

## 2023-10-02 RX ORDER — MIDAZOLAM HYDROCHLORIDE 1 MG/ML
INJECTION INTRAMUSCULAR; INTRAVENOUS
Status: DISCONTINUED | OUTPATIENT
Start: 2023-10-02 | End: 2023-10-02

## 2023-10-02 RX ORDER — SODIUM CHLORIDE 0.9 % (FLUSH) 0.9 %
10 SYRINGE (ML) INJECTION
Status: DISCONTINUED | OUTPATIENT
Start: 2023-10-02 | End: 2023-10-11 | Stop reason: HOSPADM

## 2023-10-02 RX ORDER — PROPOFOL 10 MG/ML
VIAL (ML) INTRAVENOUS CONTINUOUS PRN
Status: DISCONTINUED | OUTPATIENT
Start: 2023-10-02 | End: 2023-10-02

## 2023-10-02 RX ORDER — FENTANYL CITRATE 50 UG/ML
25 INJECTION, SOLUTION INTRAMUSCULAR; INTRAVENOUS EVERY 5 MIN PRN
Status: COMPLETED | OUTPATIENT
Start: 2023-10-02 | End: 2023-10-02

## 2023-10-02 RX ORDER — FENTANYL CITRATE 50 UG/ML
INJECTION, SOLUTION INTRAMUSCULAR; INTRAVENOUS
Status: COMPLETED
Start: 2023-10-02 | End: 2023-10-02

## 2023-10-02 RX ORDER — LIDOCAINE HYDROCHLORIDE 10 MG/ML
INJECTION INFILTRATION; PERINEURAL
Status: COMPLETED | OUTPATIENT
Start: 2023-10-02 | End: 2023-10-02

## 2023-10-02 RX ORDER — FENTANYL CITRATE 50 UG/ML
INJECTION, SOLUTION INTRAMUSCULAR; INTRAVENOUS
Status: DISCONTINUED | OUTPATIENT
Start: 2023-10-02 | End: 2023-10-02

## 2023-10-02 RX ADMIN — LIDOCAINE HYDROCHLORIDE 3 ML: 10 INJECTION, SOLUTION INFILTRATION; PERINEURAL at 11:10

## 2023-10-02 RX ADMIN — DIAZEPAM 5 MG: 5 TABLET ORAL at 09:10

## 2023-10-02 RX ADMIN — FAMOTIDINE 20 MG: 20 TABLET ORAL at 08:10

## 2023-10-02 RX ADMIN — SODIUM CHLORIDE: 0.9 INJECTION, SOLUTION INTRAVENOUS at 10:10

## 2023-10-02 RX ADMIN — OXYCODONE HYDROCHLORIDE 10 MG: 10 TABLET ORAL at 05:10

## 2023-10-02 RX ADMIN — FAMOTIDINE 20 MG: 20 TABLET ORAL at 09:10

## 2023-10-02 RX ADMIN — HEPARIN SODIUM 5000 UNITS: 5000 INJECTION INTRAVENOUS; SUBCUTANEOUS at 01:10

## 2023-10-02 RX ADMIN — FENTANYL CITRATE 25 MCG: 50 INJECTION INTRAMUSCULAR; INTRAVENOUS at 12:10

## 2023-10-02 RX ADMIN — HEPARIN SODIUM 5000 UNITS: 5000 INJECTION INTRAVENOUS; SUBCUTANEOUS at 09:10

## 2023-10-02 RX ADMIN — GABAPENTIN 800 MG: 400 CAPSULE ORAL at 02:10

## 2023-10-02 RX ADMIN — PROPOFOL 100 MCG/KG/MIN: 10 INJECTION, EMULSION INTRAVENOUS at 10:10

## 2023-10-02 RX ADMIN — FENTANYL CITRATE 50 MCG: 50 INJECTION, SOLUTION INTRAMUSCULAR; INTRAVENOUS at 10:10

## 2023-10-02 RX ADMIN — OXYCODONE HYDROCHLORIDE 10 MG: 10 TABLET ORAL at 12:10

## 2023-10-02 RX ADMIN — OXYCODONE HYDROCHLORIDE 10 MG: 10 TABLET ORAL at 06:10

## 2023-10-02 RX ADMIN — HEPARIN SODIUM 5000 UNITS: 5000 INJECTION INTRAVENOUS; SUBCUTANEOUS at 05:10

## 2023-10-02 RX ADMIN — DIAZEPAM 5 MG: 5 TABLET ORAL at 12:10

## 2023-10-02 RX ADMIN — POLYETHYLENE GLYCOL 3350 17 G: 17 POWDER, FOR SOLUTION ORAL at 08:10

## 2023-10-02 RX ADMIN — GABAPENTIN 800 MG: 400 CAPSULE ORAL at 08:10

## 2023-10-02 RX ADMIN — MIDAZOLAM HYDROCHLORIDE 1 MG: 1 INJECTION INTRAMUSCULAR; INTRAVENOUS at 10:10

## 2023-10-02 RX ADMIN — ATORVASTATIN CALCIUM 20 MG: 20 TABLET, FILM COATED ORAL at 08:10

## 2023-10-02 RX ADMIN — GABAPENTIN 800 MG: 400 CAPSULE ORAL at 09:10

## 2023-10-02 NOTE — ASSESSMENT & PLAN NOTE
Bl upper quadrant abdominal pain and CT abdomen pelvis revealed colitis. She was started on cipro and flagyl. Has not had any bowel movements since 9/18 despite aggressive bowel regimen at OSH.   CT a/p 9/26 - Large stool ball within the rectum with a suggestion of some rectal wall thickening.  Mild presacral soft tissue thickening/edema.  Findings raise the question of possible proctitis.  Improved symptoms w bowel movement  Off abx by the time she stepped down to me, has rectal tube in  Bowel regimen stopped due to diarrhea

## 2023-10-02 NOTE — CARE UPDATE
Spinal angiogram is negative. Recommend asking patient to reconsider LP. If she is in agreement with LP,  consult neurology. Neurosurgery will sign off, please call with questions/concerns.     Nhi Light PA-C  Neurosurgery   [facility]

## 2023-10-02 NOTE — PROGRESS NOTES
[facility] - Neuro[facility] ([facility])  [facility] Medicine  Progress Note    Patient Name: Rhina Forrest  MRN: [id number]  Patient Class: IP- Inpatient   Admission Date: 9/25/2023  Length of Stay: 7 days  Attending Physician: Katya Smith MD  Primary Care Provider: Cal Alvarado FNP-C        Subjective:     Principal Problem:Acute transverse myelitis        HPI:  Copied from Neuro-crit care team:  68 yo F PMHx GERD, HTN, smoker, depression presenting with bilateral lower extremity plegia that began after heavy lifting on 9/18/23. Pt walks with a walker at baseline and lives at home alone. On 9/18 she was walking home with groceries on her walker and lifted the walker over a curb and hurt her back. Pt was able to walk home and put her groceries away. Once she sat on the couch, she was unable to get back up as her legs stopped moving. She also had decreased sensation from T12 down. Pt called 911 and was taken via EMS to [facility]. MRI cervical and thoracic spine revealed stenosis and T9-L1 cord edema with mild enhancement suspicious for evolving cord infarct vs demyelinating process per radiology report (no imaging was sent with patient). Pt was found to have NSTEMI with troponin 1.1 and was started on ASA and plavix. Pt also c/o bl upper quadrant abdominal pain and CT abdomen pelvis revealed colitis. She was started on cipro and flagyl. Pt has had a barber since 9/18 and has not had any bowel movements since 9/18 despite aggressive bowel regimen at OSH. Transferred to [facility] [place] neurosurgical evaluation and further workup. Pt currently c/o severe abdominal pain, n/v, severe back pain, BUE rash x 1 month, BLE plegia and T12 sensory level.          Overview/Hospital Course:  Neuro-crit care:  09/26/2023 naeon, improved abdominal pain w bowel movement, no change in exam  09/27/2023: NAEON. Antibiotics stopped. Pain control regimen adjusted. Patient refusing spinal angio today, will communicate with  Neuro IR to see if she can be moved till tomorrow.   09/28/2023 continued pain control issues, awaiting further diagnostic davis  9/30/2023- NAEON, added sq heparin, decrease statin d/t mild transaminitis  10/1/23: stepped down to HM        Interval History: assuming care today, seen after spinal angio and still recovering from sedation, currently sleeping    Review of Systems   Unable to perform ROS: Other   Sleepy from sedation  Objective:     Vital Signs (Most Recent):  Temp: 98.5 °F (36.9 °C) (10/02/23 1312)  Pulse: 73 (10/02/23 1539)  Resp: 18 (10/02/23 1312)  BP: 127/74 (10/02/23 1312)  SpO2: 95 % (10/02/23 1312) Vital Signs (24h Range):  Temp:  [98 °F (36.7 °C)-98.6 °F (37 °C)] 98.5 °F (36.9 °C)  Pulse:  [68-83] 73  Resp:  [17-24] 18  SpO2:  [91 %-100 %] 95 %  BP: (115-162)/(54-88) 127/74     Weight: 91.6 kg (201 lb 15.1 oz)  Body mass index is 34.66 kg/m².    Intake/Output Summary (Last 24 hours) at 10/2/2023 1605  Last data filed at 10/2/2023 1300  Gross per 24 hour   Intake 440 ml   Output 1575 ml   Net -1135 ml         Physical Exam  Vitals and nursing note reviewed.   Cardiovascular:      Rate and Rhythm: Normal rate and regular rhythm.      Pulses: Normal pulses.   Pulmonary:      Effort: Pulmonary effort is normal. No respiratory distress.   Abdominal:      General: Bowel sounds are normal. There is no distension.      Tenderness: There is no abdominal tenderness. There is no guarding.     Rectal tube and barber catheter present        Significant Labs: All pertinent labs within the past 24 hours have been reviewed.  CBC:   Recent Labs   Lab 10/01/23  0446 10/02/23  0442   WBC 5.37 4.56   HGB 12.2 11.9*   HCT 35.3* 35.9*    248     CMP:   Recent Labs   Lab 10/01/23  0446 10/02/23  0442   * 132*   K 4.5 4.6    102   CO2 24 26   * 215*   BUN 10 8   CREATININE 0.7 0.6   CALCIUM 8.7 8.9   PROT 5.8* 5.7*   ALBUMIN 2.2* 2.1*   BILITOT 0.4 0.4   ALKPHOS 165* 137*   AST 46* 32   ALT 73*  58*   ANIONGAP 5* 4*       Significant Imaging: I have reviewed all pertinent imaging results/findings within the past 24 hours.      Assessment/Plan:      * Acute transverse myelitis  68 yo F PMHx GERD, HTN, smoker, depression presenting with bilateral lower extremity paresis and paresthesia that began after heavy lifting on 23. OSH MRI cervical and thoracic spine revealed stenosis and T9-L1 central cord edema with mild enhancement. MRA spine w DWI inconclusive   Sudden onset with valsalva and evolution over 3 hours most suspicious for a vascular lesion, ddx includes demyelination and infectious processes    -serum NMO negative  -Spinal angiogram 10/2 - normal  -Neurosurgery recommends LP with Neurology consult, team signed off today   -q4 neuro checks, sleep holiday  -permissive htn, aggressive bowel/bladder care, aggressive pulm toilet  -multimodal pain control  -pt/ot, diet, oob as able      Urinary retention  CIC while in ICU, had high bladder residual this morning >800 on nursing assessment therefore barber catheter insertion ordered  -maintain barber catheter until more mobile and pending LP      Debility  PT/OT evaluation      Colitis  Bl upper quadrant abdominal pain and CT abdomen pelvis revealed colitis. She was started on cipro and flagyl. Has not had any bowel movements since  despite aggressive bowel regimen at OSH.   CT a/p  - Large stool ball within the rectum with a suggestion of some rectal wall thickening.  Mild presacral soft tissue thickening/edema.  Findings raise the question of possible proctitis.  Improved symptoms w bowel movement  Off abx by the time she stepped down to me, has rectal tube in  Bowel regimen stopped due to diarrhea      GERD (gastroesophageal reflux disease)  On famotidine 20 bid    Depression  Hx of depression. 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 Pt's son committed suicide and her mother  after her son passed. Pt now lives  alone and has minimal interaction with her 2 living children. Pt found to have several psychosomatic complaints at OSH related to medication. She stated she takes her home medications 1-2x per month as she is scared of the side effects.    Consider psych evaluation after acute period       VTE Risk Mitigation (From admission, onward)         Ordered     heparin (porcine) injection 5,000 Units  Every 8 hours         09/30/23 0942     IP VTE HIGH RISK PATIENT  Once         09/25/23 1119     Place sequential compression device  Until discontinued         09/25/23 1119     Reason for No Pharmacological VTE Prophylaxis  Once        Question:  Reasons:  Answer:  Risk of Bleeding    09/25/23 1119                Discharge Planning   DEMETRICE: 10/5/2023     Code Status: Full Code   Is the patient medically ready for discharge?: No    Reason for patient still in hospital (select all that apply): Patient trending condition, Treatment and Consult recommendations  Discharge Plan A: Home with family   Discharge Delays: None known at this time              Katya Smith MD  Department of Hospital Medicine   [facility] - Neurosurgery ([facility])

## 2023-10-02 NOTE — ASSESSMENT & PLAN NOTE
68 yo F PMHx GERD, HTN, smoker, depression presenting with bilateral lower extremity paresis and paresthesia that began after heavy lifting on 9/18/23. OSH MRI cervical and thoracic spine revealed stenosis and T9-L1 central cord edema with mild enhancement. MRA spine w DWI inconclusive   Sudden onset with valsalva and evolution over 3 hours most suspicious for a vascular lesion, ddx includes demyelination and infectious processes    -serum NMO negative  -Spinal angiogram 10/2 - normal  -Neurosurgery recommends LP with Neurology consult, team signed off today   -q4 neuro checks, sleep holiday  -permissive htn, aggressive bowel/bladder care, aggressive pulm toilet  -multimodal pain control  -pt/ot, diet, oob as able

## 2023-10-02 NOTE — PT/OT/SLP PROGRESS
Physical Therapy      Patient Name:  Rhina Forrest   MRN:  [id number]    Patient not seen today secondary to Off the floor for procedure/surgery (Per MD, hold until angiogram performed; angio performed this AM and then requires bed rest so will hold on eval today). Will follow-up as appropriate.  My Zarco, PT

## 2023-10-02 NOTE — NURSING TRANSFER
Nursing Transfer Note      10/1/2023   8:46 PM    Nurse giving handoff:An  Nurse receiving handoff:Npu nurse    Reason patient is being transferred: MD ordered    Transfer To: 909 From: 9087    Transfer via bed    Transfer with cardiac monitoring    Transported by RN      Telemetry: 0077  Order for Tele Monitor?     4eyes on Skin:     Any special needs or follow-up needed: Spinal angio 10/2    Patient belongings transferred with patient: Yes    Chart send with patient: Yes    Notified: Pt notified family    Patient reassessed at: 2045 10/1/23  Upon arrival to floor: patient oriented to room, call bell in reach, and bed in lowest position, phone, clothes, laptop, chargers, walker

## 2023-10-02 NOTE — NURSING TRANSFER
Nursing Transfer Note      10/2/2023   1:05 PM    Reason patient is being transferred: Recovery criteria met    PACU nurse giving handoff: AMANDO Mir    Nurse receiving handoff: AMANDO Valles    Transfer to 909    Transfer via stretcher    Transfer with cardiac monitoring    Transported by PCT    Telemetry: Box # [id number] HR 75 NSR  Verified on & working by PACU RN: Yes    Transfer Vital Signs @ 1300  Temperature: 98.1  Blood Pressure: 154/79  Heart Rate: 75 NSR  O2 Sat: 94% on RA  Respirations: 23    Medicines/Equipment sent with patient: None    Any special needs or follow-up needed: Bedrest until 1335    Patient belongings transferred with patient: Yes Personal pillow; Heel boots on    Chart send with patient: Yes    Notified: Patient declined    Patient reassessed prior to transfer at: 10/2/23 @ 1300

## 2023-10-02 NOTE — ASSESSMENT & PLAN NOTE
CIC while in ICU, had high bladder residual this morning >800 on nursing assessment therefore barber catheter insertion ordered  -maintain barber catheter until more mobile and pending LP

## 2023-10-02 NOTE — ASSESSMENT & PLAN NOTE
Hx of depression. 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 Pt's son committed suicide and her mother  after her son passed. Pt now lives alone and has minimal interaction with her 2 living children. Pt found to have several psychosomatic complaints at OSH related to medication. She stated she takes her home medications 1-2x per month as she is scared of the side effects.    Consider psych evaluation after acute period

## 2023-10-02 NOTE — PROCEDURES
Radiology Post-Procedure Note    Pre Op Diagnosis: spinal cord edema    Post Op Diagnosis: same    Procedure: Spinal angiogram    Procedure performed by: Ronnie Gardner MD    Written Informed Consent Obtained: Yes    Specimen Removed: NO    Estimated Blood Loss: Minimal    Procedure report:     A 6F sheath was placed into the right femoral artery and a 5F Gopi catheter was advanced into the aortic arch.  The T5 to L4 bilateral spinal arteries were subselected and angiography of the brain was performed after injection into each of these vessels.    Preliminary interpretation: normal spinal angiogram.  Please see Imaging report for full details.    A right femoral artery angiogram was performed, the sheath removed and hemostasis achieved using angioseal.  No hematoma was present at the time of hemostasis.    The patient tolerated the procedure well.         Ronnie Gardner MD  Vascular and Interventional Neurology  , Department of Neurology  Section Head, Vascular Neurology  Departments of Neurology, Neurosurgery and Radiology  [facility] [place], [place]

## 2023-10-02 NOTE — HOSPITAL COURSE
ICU course - remarkable for bowel decompression with laxatives - rectal tube inserted, improved abdominal pain, discontinuation of all antibiotics, pain control issues (better on current rx). Stepped down to HM 10/1/23. On 10/2/23, IR performed spinal angiogram - came back normal. NSGy recommended Neurology consult for LP and signed off. Hernandez catheter inserted the same day for urinary retention.  Repeat MRI is negative for any new or acute findings.  Neurology performed a bedside LP on 10/05/2023 with no significantly elevated WBCs. High dose steroids started on 10/6/23. No neurologic improvement after 3 days, so extended to 5 day course which was completed on 10/10.  Neuro consult recommends PT/OT and Rehab with close f/u in clinic for further lab results and potential other therapies such as IVIG or PLEX.  Sinus congestion with cough 2/2 postnasal drip- mucinex started.  Hypertensive urgency x 1 on 10/10: SBP > 180, no Sx other than fatigue - given hydralazine 25 PO x 1.  Long discussion about 5-10 years of significant life stressors, grief, and PTSD. 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  He  of suicide, and daughter-in-law remarried and moved all grandchildren to [place] with new , so now grandchildren and patient are estranged.  Patient says daughter in law told grandchildren false negative information about her. Patient's mother , and patient has a lot of guilt about not seeing her prior to her death.  Previously on ativan PRN (took sparingly) and SSRIs (unknown details) and she self discontinued meds as she does not like being on meds.   She does not follow with any mental health professionals.   Psychology Dr Flako Ackerman consulted however pt's d/c facility secured quickly and pt d/c-ed prior to consult. D/c-ed to an outside Inpatient Rehab  with close outpt f/u with neuro.

## 2023-10-02 NOTE — PLAN OF CARE
Pt arrived to IR Room 4 for spinal angiogram with anesthesia. Pt oriented to unit and staff. Plan of care reviewed with patient, patient verbalizes understanding. Comfort measures utilized. Pt safely transferred from stretcher to procedural table. Fall risk reviewed with patient, fall risk interventions maintained. Safety strap applied, positioner pillows utilized to minimize pressure points. Blankets applied. Pt prepped and draped utilizing standard sterile technique. Patient placed on continuous monitoring, as required by sedation policy. Timeouts completed utilizing standard universal time-out, per department and facility policy. RN to remain at bedside, continuous monitoring maintained per CRNA. Pt resting comfortably. Denies pain/discomfort. Will continue to monitor. See flow sheets for monitoring, medication administration, and updates.

## 2023-10-02 NOTE — PLAN OF CARE
Problem: Adult Inpatient Plan of Care  Goal: Plan of Care Review  Outcome: Ongoing, Progressing  Goal: Patient-Specific Goal (Individualized)  Outcome: Ongoing, Progressing  Goal: Absence of Hospital-Acquired Illness or Injury  Outcome: Ongoing, Progressing  Goal: Optimal Comfort and Wellbeing  Outcome: Ongoing, Progressing     Problem: Skin Injury Risk Increased  Goal: Skin Health and Integrity  Outcome: Ongoing, Progressing     Problem: Impaired Wound Healing  Goal: Optimal Wound Healing  Outcome: Ongoing, Progressing           POC updated and reviewed with the patient at bedside. Questions regarding POC were encouraged and addressed. VSS, see flowsheets. Patient is AO x 4 at this time. Tele maintained per order. Pain/Nausea management using PRNs, effective. See MAR for medication administration details. Fall/safety precautions maintained.  No signs of injury noted over night. Patient was repositioned for comfort with bed locked in low position, side rails up x 3, bed alarm on, and call light within reach. Instructed patient to call staff for mobility, verbalized understanding. No acute signs of distress noted at this time.

## 2023-10-02 NOTE — PLAN OF CARE
[facility] (H[facility])  Discharge Reassessment    Per MD team, pt is not medically ready to d/c at this time. Pt scheduled for spinal angio today. Will need therapy recs when appropriate. Will continue to follow for needs.    Primary Care Provider: Cal Alvarado FNP-C    Expected Discharge Date: 10/4/2023    Reassessment (most recent)       Discharge Reassessment - 10/02/23 1448          Discharge Reassessment    Assessment Type Discharge Planning Reassessment     Did the patient's condition or plan change since previous assessment? No     Discharge Plan discussed with: Patient     Communicated DEMETRICE with patient/caregiver Date not available/Unable to determine     Discharge Plan A Home with family (P)      Discharge Plan B Home with family (P)      DME Needed Upon Discharge  other (see comments) (P)    TBD    Why the patient remains in the hospital Requires continued medical care (P)         Post-Acute Status    Coverage MEDICARE - MEDICARE PART A & B (P)      Discharge Delays None known at this time (P)                    Francia Sparks, XOCHITL, CSW    Case Management Department

## 2023-10-02 NOTE — PT/OT/SLP PROGRESS
Occupational Therapy      Patient Name:  Rhina Forrest   MRN:  [id number]    Patient not seen today secondary to Off the floor for procedure/surgery. Will follow-up as appropriate.    10/2/2023

## 2023-10-02 NOTE — NURSING
Nurses Note -- 4 Eyes      10/2/2023   6:30 AM      Skin assessed during: Transfer      [] No Altered Skin Integrity Present    []Prevention Measures Documented      [x] Yes- Altered Skin Integrity Present or Discovered   [x] LDA Added if Not in Epic (Describe Wound)   [] New Altered Skin Integrity was Present on Admit and Documented in LDA   [] Wound Image Taken    Wound Care Consulted? No    Attending Nurse:  Corwin Vega RN/Staff Member:  Melanie    Redness to perirectal area. Already noted on LDA    Redness and swelling noted in perineum LDA added

## 2023-10-02 NOTE — PLAN OF CARE
Spinal angiogram completed. Patient tolerated well; monitoring maintained per anesthesia. Hemostasis achieved to right groin via 6 Fr Angioseal at 1135; patient to remain flat until 1335. Site CDI without hematoma. Patient to be transported to PACU accompanied by this RN and CRNA; report to be given at the bedside.

## 2023-10-02 NOTE — TRANSFER OF CARE
"Anesthesia Transfer of Care Note    Patient: Rhina Forrest    Procedure(s) Performed: * No procedures listed *    Patient location: PACU    Anesthesia Type: general    Transport from OR: Transported from OR on 6-10 L/min O2 by face mask with adequate spontaneous ventilation    Post pain: adequate analgesia    Post assessment: no apparent anesthetic complications and tolerated procedure well    Post vital signs: stable    Level of consciousness: responds to stimulation and sedated    Nausea/Vomiting: no nausea/vomiting    Complications: none    Transfer of care protocol was followed      Last vitals:   Visit Vitals  BP (!) 121/64   Pulse 68   Temp 37 °C (98.6 °F) (Temporal)   Resp 18   Ht 5' 4" (1.626 m)   Wt 91.6 kg (201 lb 15.1 oz)   SpO2 100%   BMI 34.66 kg/m²     "

## 2023-10-02 NOTE — ANESTHESIA PREPROCEDURE EVALUATION
[facility]  Anesthesia Pre-Operative Evaluation         Patient Name: Rhina Forrest  YOB: 1955  MRN: [id number]    SUBJECTIVE:     Pre-operative evaluation for Spinal Angiogram     10/02/2023    Rhina Forrest is a 67 y.o. female w/ a significant PMHx of GERD, HTN, Smoker, Depression and New onset BLE paresthesias and paresis.    Patient now presents for the above procedure(s).      LDA:        Peripheral IV - Single Lumen 09/30/23 1612 22 G Left;Posterior Hand (Active)   Site Assessment Clean;Dry;Intact;No redness;No swelling 10/02/23 1012   Extremity Assessment Distal to IV No warmth;No swelling;No redness;No abnormal discoloration 10/02/23 0743   Line Status Flushed;Saline locked 10/02/23 1012   Dressing Status Clean;Dry;Intact 10/02/23 0743   Dressing Intervention Integrity maintained 10/02/23 0743   Dressing Change Due 10/04/23 10/02/23 0743   Site Change Due 10/04/23 10/02/23 0743   Reason Not Rotated Not due 10/02/23 0743   Number of days: 1            Peripheral IV - Single Lumen 10/01/23 0637 20 G Anterior;Right Upper Arm (Active)   Site Assessment Clean;Dry;Intact 10/02/23 1012   Extremity Assessment Distal to IV No warmth;No swelling;No redness;No abnormal discoloration 10/02/23 0743   Line Status Flushed;Infusing 10/02/23 1012   Dressing Status Clean;Dry;Intact 10/02/23 0743   Dressing Intervention Integrity maintained 10/02/23 0743   Dressing Change Due 10/05/23 10/02/23 0743   Site Change Due 10/05/23 10/02/23 0743   Reason Not Rotated Not due 10/02/23 0743   Number of days: 1            Rectal Tube 09/26/23 1603 (Active)   Balloon Inflation Volume (mL) 45 10/02/23 0743   Reposition drainage bags for BMS & Hernandez on opposite sides of bed 10/02/23 0743   Outcome gas expelled 10/02/23 0743   Stool Color Brown 10/02/23 0743   Insertion Site Appearance no redness, warmth, tenderness, skin breakdown, drainage 10/02/23 0743   Flush/Irrigation water;sterile normal saline  "10/01/23 0901   Intake (mL) 0 mL 09/30/23 1801   Rectal Tube Output 100 mL 09/30/23 1801   Number of days: 5            Urethral Catheter 10/02/23 0649 Non-latex 16 Fr. (Active)   $ Hernandez Insertion Bedside Insertion Performed 10/02/23 0649   Site Assessment Intact;Clean 10/02/23 0743   Collection Container Urimeter 10/02/23 0743   Securement Method secured to top of thigh w/ adhesive device 10/02/23 0743   Catheter Care Performed yes 10/02/23 0743   Reason for Continuing Urinary Catheterization Urinary retention 10/02/23 0743   CAUTI Prevention Bundle Securement Device in place with 1" slack;Intact seal between catheter & drainage tubing;Drainage bag/urimeter off the floor;No dependent loops or kinks;Drainage bag/urimeter not overfilled (<2/3 full);Drainage bag/urimeter below bladder 10/02/23 0743   Number of days: 0        Patient Active Problem List   Diagnosis    Acute transverse myelitis    Depression    Internal Hemorrhoids    GERD (gastroesophageal reflux disease)    Colitis    Debility       Review of patient's allergies indicates:   Allergen Reactions    Acetaminophen Shortness Of Breath    Azithromycin Shortness Of Breath    Amlodipine      hypertension    Androgenic anabolic steroid      hypertension    Bisoprolol Other (See Comments)     hypertension    Buspirone Other (See Comments)     hypertension    Cortisone Dermatitis     Burning skin feeling     Erythromycin base Hives    Ibuprofen Other (See Comments)     Hypertension      Immune globulin,gamma caprylate(igg) Hives     All Gamma globulins    Latex, natural rubber Itching    Levaquin [levofloxacin] Other (See Comments)     Fever      Losartan Other (See Comments)     hypertension    Penicillins Hives    Wellbutrin [bupropion hcl] Other (See Comments)     depression    Adhesive Rash    Hydrochlorothiazide Rash    Macrobid [nitrofurantoin monohyd/m-cryst] Rash       Current Outpatient Medications:    Current " Facility-Administered Medications:     atorvastatin tablet 20 mg, 20 mg, Oral, Daily, Miinea, Jud, NP, 20 mg at 10/02/23 0814    diazePAM tablet 5 mg, 5 mg, Oral, Q4H PRN, Miinea, Jud, NP, 5 mg at 09/28/23 1819    famotidine tablet 20 mg, 20 mg, Oral, BID, Yolanda, Clemencia L, NP, 20 mg at 10/02/23 0814    gabapentin capsule 800 mg, 800 mg, Oral, TID, Miinea, Jud, NP, 800 mg at 10/02/23 0814    heparin (porcine) injection 5,000 Units, 5,000 Units, Subcutaneous, Q8H, Miinea, Jud, NP, 5,000 Units at 10/02/23 0509    ondansetron injection 4 mg, 4 mg, Intravenous, Q8H PRN, Miinea, Jud, NP    oxyCODONE immediate release tablet 5 mg, 5 mg, Oral, Q4H PRN, Miinea, Jud, NP, 5 mg at 10/01/23 1632    oxyCODONE immediate release tablet Tab 10 mg, 10 mg, Oral, Q4H PRN, Miinea, Jud, NP, 10 mg at 10/02/23 0508    polyethylene glycol packet 17 g, 17 g, Oral, Daily, Yolanda, Clemencia L, NP, 17 g at 10/02/23 0814    simethicone chewable tablet 80 mg, 1 tablet, Oral, TID PRN, Miinea, Jud, NP, 80 mg at 10/01/23 0815    Past Surgical History:   Procedure Laterality Date    CHOLECYSTECTOMY         Social History     Socioeconomic History    Marital status:      Social Determinants of Health     Financial Resource Strain: Unknown (9/26/2023)    Overall Financial Resource Strain (CARDIA)     Difficulty of Paying Living Expenses: Patient refused   Food Insecurity: Unknown (9/26/2023)    Hunger Vital Sign     Worried About Running Out of Food in the Last Year: Patient refused     Ran Out of Food in the Last Year: Patient refused   Transportation Needs: Unknown (9/26/2023)    PRAPARE - Transportation     Lack of Transportation (Medical): Patient refused     Lack of Transportation (Non-Medical): Patient refused   Physical Activity: Unknown (9/26/2023)    Exercise Vital Sign     Days of Exercise per Week: Patient refused     Minutes of Exercise per Session: Patient refused   Stress: Unknown (9/26/2023)    [place]  [place] [employer] - Occupational Stress Questionnaire     Feeling of Stress : Patient refused   Social Connections: Unknown (9/26/2023)    Social Connection and Isolation Panel [NHANES]     Frequency of Communication with Friends and Family: Patient refused     Frequency of Social Gatherings with Friends and Family: Patient refused     Attends Shinto Services: Patient refused     Active Member of Clubs or Organizations: Patient refused     Attends Club or Organization Meetings: Patient refused     Marital Status: Patient refused   Housing Stability: Unknown (9/26/2023)    Housing Stability Vital Sign     Unable to Pay for Housing in the Last Year: Patient refused     Unstable Housing in the Last Year: Patient refused       OBJECTIVE:     Vital Signs Range (Last 24H):  Temp:  [36.4 °C (97.6 °F)-37.2 °C (98.9 °F)]   Pulse:  [69-83]   Resp:  [17-26]   BP: (109-149)/(54-88)   SpO2:  [92 %-97 %]       Significant Labs:  Lab Results   Component Value Date    WBC 4.56 10/02/2023    HGB 11.9 (L) 10/02/2023    HCT 35.9 (L) 10/02/2023     10/02/2023    CHOL 79 (L) 09/25/2023    TRIG 57 09/25/2023    HDL 36 (L) 09/25/2023    ALT 58 (H) 10/02/2023    AST 32 10/02/2023     (L) 10/02/2023    K 4.6 10/02/2023     10/02/2023    CREATININE 0.6 10/02/2023    BUN 8 10/02/2023    CO2 26 10/02/2023    TSH 4.370 (H) 09/25/2023    INR 1.1 09/25/2023    HGBA1C 5.9 (H) 09/25/2023       Diagnostic Studies: No relevant studies.    EKG:   Results for orders placed or performed during the hospital encounter of 09/25/23   EKG, 12 - Lead    Collection Time: 09/25/23 12:43 PM    Narrative    Test Reason : G95.20,    Vent. Rate : 062 BPM     Atrial Rate : 062 BPM     P-R Int : 138 ms          QRS Dur : 082 ms      QT Int : 424 ms       P-R-T Axes : 058 061 038 degrees     QTc Int : 430 ms    Normal sinus rhythm  Normal ECG  No previous ECGs available  Confirmed by MARIELLA ORANTES MD ([id number]) on 9/26/2023  1:05:02 PM    Referred By: MANNY BAKER JR.           Confirmed By:MARIELLA ORANTES MD       2D ECHO:  TTE:  Results for orders placed or performed during the hospital encounter of 09/25/23   Echo   Result Value Ref Range    Ascending aorta 2.60 cm    STJ 2.20 cm    AV mean gradient 4 mmHg    Ao peak baldo 1.47 m/s    Ao VTI 24.42 cm    IVS 0.90 0.6 - 1.1 cm    Left Atrium Major Axis 5.05 cm    Left Atrium Minor Axis 5.37 cm    LVIDd 4.88 3.5 - 6.0 cm    LVIDs 3.04 2.1 - 4.0 cm    LVOT diameter 1.79 cm    LVOT peak VTI 22.46 cm    Posterior Wall 0.93 0.6 - 1.1 cm    MV Peak A Baldo 0.77 m/s    E wave deceleration time 377.62 msec    MV Peak E Baldo 0.57 m/s    RA Major Axis 4.76 cm    RA Width 3.60 cm    RVDD 3.55 cm    Sinus 2.99 cm    TAPSE 1.82 cm    LA WIDTH 3.51 cm    LV Diastolic Volume 111.61 mL    LV Systolic Volume 36.03 mL    LVOT peak baldo 1.12 m/s    TDI LATERAL 0.06 m/s    TDI SEPTAL 0.06 m/s    LV LATERAL E/E' RATIO 9.50 m/s    LV SEPTAL E/E' RATIO 9.50 m/s    FS 38 %    LV mass 155.27 g    ZLVIDD -1.39     ZLVIDS -1.00     Left Ventricle Relative Wall Thickness 0.38 cm    AV valve area 2.31 cm²    AV Velocity Ratio 0.76     AV index (prosthetic) 0.92     E/A ratio 0.74     Mean e' 0.06 m/s    LVOT area 2.5 cm2    LVOT stroke volume 56.49 cm3    AV peak gradient 9 mmHg    E/E' ratio 9.50 m/s    LV Systolic Volume Index 18.4 mL/m2    LV Diastolic Volume Index 56.94 mL/m2    LV Mass Index 79 g/m2    MARTHA by Velocity Ratio 1.92 cm²    BSA 2.03 m2    Est. RA pres 3 mmHg    Narrative      Technically difficult study    Left Ventricle: The left ventricle is normal in size. Normal wall   thickness. Normal wall motion. There is normal systolic function with a   visually estimated ejection fraction of 60 - 65%. There is normal   diastolic function.    Right Ventricle: Normal right ventricular cavity size. Wall thickness   is normal. Right ventricle wall motion  is normal. Systolic function is   normal.     IVC/SVC: Normal venous pressure at 3 mmHg.         MARIA DEL ROSARIO:  No results found for this or any previous visit.    ASSESSMENT/PLAN:           Pre-op Assessment    I have reviewed the Patient Summary Reports.     I have reviewed the Nursing Notes. I have reviewed the NPO Status.   I have reviewed the Medications.     Review of Systems  Anesthesia Hx:  No problems with previous Anesthesia  History of prior surgery of interest to airway management or planning: Denies Family Hx of Anesthesia complications.   Denies Personal Hx of Anesthesia complications.   Social:  Smoker    Hematology/Oncology:  Hematology Normal   Oncology Normal     EENT/Dental:EENT/Dental Normal   Cardiovascular:   Hypertension    Pulmonary:   COPD    Renal/:  Renal/ Normal     Hepatic/GI:   GERD    Musculoskeletal:  Musculoskeletal Normal    Neurological:  Neuro Symptoms of paresthesia, paralysis BLE  Endocrine:  Obesity / BMI > 30  Dermatological:  Skin Normal    Psych:   Psychiatric History depression          Physical Exam  General: Alert and Cooperative    Airway:  Mallampati: III / II  Mouth Opening: Normal  TM Distance: 4 - 6 cm  Tongue: Normal  Neck ROM: Flexion Decreased, Extension Decreased    Dental:  Edentulous    Musculoskeletal:diminished sensation on BLE, unable to move BLE      Anesthesia Plan  Type of Anesthesia, risks & benefits discussed:    Anesthesia Type: Gen Natural Airway  Intra-op Monitoring Plan: Standard ASA Monitors  Post Op Pain Control Plan: multimodal analgesia and IV/PO Opioids PRN  Induction:  IV  Airway Plan: Direct and Video, Post-Induction  Informed Consent: Informed consent signed with the Patient and all parties understand the risks and agree with anesthesia plan.  All questions answered.   ASA Score: 3  Day of Surgery Review of History & Physical: H&P Update referred to the surgeon/provider.    Ready For Surgery From Anesthesia Perspective.     .

## 2023-10-02 NOTE — NURSING
Patient presents to [facility] for IR pre-procedure. ID verified. Allergies reviewed. Assessment completed. IV x2 flushed/patent. Consents verified, both procedure and anesthesia consents on paper. IR board notified patient ready.

## 2023-10-03 LAB
ALBUMIN SERPL BCP-MCNC: 2.1 G/DL (ref 3.5–5.2)
ALP SERPL-CCNC: 152 U/L (ref 55–135)
ALT SERPL W/O P-5'-P-CCNC: 52 U/L (ref 10–44)
ANION GAP SERPL CALC-SCNC: 8 MMOL/L (ref 8–16)
AST SERPL-CCNC: 34 U/L (ref 10–40)
BASOPHILS # BLD AUTO: 0.02 K/UL (ref 0–0.2)
BASOPHILS NFR BLD: 0.4 % (ref 0–1.9)
BILIRUB SERPL-MCNC: 0.5 MG/DL (ref 0.1–1)
BUN SERPL-MCNC: 9 MG/DL (ref 8–23)
CALCIUM SERPL-MCNC: 8.6 MG/DL (ref 8.7–10.5)
CHLORIDE SERPL-SCNC: 99 MMOL/L (ref 95–110)
CO2 SERPL-SCNC: 24 MMOL/L (ref 23–29)
CREAT SERPL-MCNC: 0.6 MG/DL (ref 0.5–1.4)
DIFFERENTIAL METHOD: ABNORMAL
EOSINOPHIL # BLD AUTO: 0.1 K/UL (ref 0–0.5)
EOSINOPHIL NFR BLD: 2.1 % (ref 0–8)
ERYTHROCYTE [DISTWIDTH] IN BLOOD BY AUTOMATED COUNT: 12.1 % (ref 11.5–14.5)
EST. GFR  (NO RACE VARIABLE): >60 ML/MIN/1.73 M^2
GLUCOSE SERPL-MCNC: 202 MG/DL (ref 70–110)
HCT VFR BLD AUTO: 34.2 % (ref 37–48.5)
HGB BLD-MCNC: 11.6 G/DL (ref 12–16)
IMM GRANULOCYTES # BLD AUTO: 0.02 K/UL (ref 0–0.04)
IMM GRANULOCYTES NFR BLD AUTO: 0.4 % (ref 0–0.5)
LYMPHOCYTES # BLD AUTO: 0.8 K/UL (ref 1–4.8)
LYMPHOCYTES NFR BLD: 16.3 % (ref 18–48)
MAGNESIUM SERPL-MCNC: 1.8 MG/DL (ref 1.6–2.6)
MCH RBC QN AUTO: 31.1 PG (ref 27–31)
MCHC RBC AUTO-ENTMCNC: 33.9 G/DL (ref 32–36)
MCV RBC AUTO: 92 FL (ref 82–98)
MONOCYTES # BLD AUTO: 0.6 K/UL (ref 0.3–1)
MONOCYTES NFR BLD: 12.2 % (ref 4–15)
NEUTROPHILS # BLD AUTO: 3.2 K/UL (ref 1.8–7.7)
NEUTROPHILS NFR BLD: 68.6 % (ref 38–73)
NRBC BLD-RTO: 0 /100 WBC
PHOSPHATE SERPL-MCNC: 4 MG/DL (ref 2.7–4.5)
PLATELET # BLD AUTO: 253 K/UL (ref 150–450)
PMV BLD AUTO: 8.9 FL (ref 9.2–12.9)
POTASSIUM SERPL-SCNC: 4.4 MMOL/L (ref 3.5–5.1)
PROT SERPL-MCNC: 5.8 G/DL (ref 6–8.4)
RBC # BLD AUTO: 3.73 M/UL (ref 4–5.4)
SODIUM SERPL-SCNC: 131 MMOL/L (ref 136–145)
WBC # BLD AUTO: 4.67 K/UL (ref 3.9–12.7)

## 2023-10-03 PROCEDURE — 25000003 PHARM REV CODE 250: Performed by: NURSE PRACTITIONER

## 2023-10-03 PROCEDURE — 21400001 HC TELEMETRY ROOM

## 2023-10-03 PROCEDURE — 36415 COLL VENOUS BLD VENIPUNCTURE: CPT | Performed by: NURSE PRACTITIONER

## 2023-10-03 PROCEDURE — 97166 OT EVAL MOD COMPLEX 45 MIN: CPT

## 2023-10-03 PROCEDURE — 99232 PR SUBSEQUENT HOSPITAL CARE,LEVL II: ICD-10-PCS | Mod: ,,, | Performed by: HOSPITALIST

## 2023-10-03 PROCEDURE — 63600175 PHARM REV CODE 636 W HCPCS: Performed by: NURSE PRACTITIONER

## 2023-10-03 PROCEDURE — 83735 ASSAY OF MAGNESIUM: CPT | Performed by: NURSE PRACTITIONER

## 2023-10-03 PROCEDURE — 94761 N-INVAS EAR/PLS OXIMETRY MLT: CPT

## 2023-10-03 PROCEDURE — 97112 NEUROMUSCULAR REEDUCATION: CPT

## 2023-10-03 PROCEDURE — 97530 THERAPEUTIC ACTIVITIES: CPT

## 2023-10-03 PROCEDURE — 80053 COMPREHEN METABOLIC PANEL: CPT | Performed by: NURSE PRACTITIONER

## 2023-10-03 PROCEDURE — 51702 INSERT TEMP BLADDER CATH: CPT

## 2023-10-03 PROCEDURE — 85025 COMPLETE CBC W/AUTO DIFF WBC: CPT | Performed by: NURSE PRACTITIONER

## 2023-10-03 PROCEDURE — 84100 ASSAY OF PHOSPHORUS: CPT | Performed by: NURSE PRACTITIONER

## 2023-10-03 PROCEDURE — 99232 SBSQ HOSP IP/OBS MODERATE 35: CPT | Mod: ,,, | Performed by: HOSPITALIST

## 2023-10-03 PROCEDURE — 97162 PT EVAL MOD COMPLEX 30 MIN: CPT

## 2023-10-03 RX ADMIN — DIAZEPAM 5 MG: 5 TABLET ORAL at 09:10

## 2023-10-03 RX ADMIN — OXYCODONE HYDROCHLORIDE 10 MG: 10 TABLET ORAL at 01:10

## 2023-10-03 RX ADMIN — OXYCODONE HYDROCHLORIDE 10 MG: 10 TABLET ORAL at 05:10

## 2023-10-03 RX ADMIN — OXYCODONE HYDROCHLORIDE 10 MG: 10 TABLET ORAL at 08:10

## 2023-10-03 RX ADMIN — ATORVASTATIN CALCIUM 20 MG: 20 TABLET, FILM COATED ORAL at 08:10

## 2023-10-03 RX ADMIN — SIMETHICONE 80 MG: 80 TABLET, CHEWABLE ORAL at 01:10

## 2023-10-03 RX ADMIN — HEPARIN SODIUM 5000 UNITS: 5000 INJECTION INTRAVENOUS; SUBCUTANEOUS at 09:10

## 2023-10-03 RX ADMIN — GABAPENTIN 800 MG: 400 CAPSULE ORAL at 04:10

## 2023-10-03 RX ADMIN — GABAPENTIN 800 MG: 400 CAPSULE ORAL at 09:10

## 2023-10-03 RX ADMIN — HEPARIN SODIUM 5000 UNITS: 5000 INJECTION INTRAVENOUS; SUBCUTANEOUS at 05:10

## 2023-10-03 RX ADMIN — HEPARIN SODIUM 5000 UNITS: 5000 INJECTION INTRAVENOUS; SUBCUTANEOUS at 01:10

## 2023-10-03 RX ADMIN — POLYETHYLENE GLYCOL 3350 17 G: 17 POWDER, FOR SOLUTION ORAL at 08:10

## 2023-10-03 RX ADMIN — FAMOTIDINE 20 MG: 20 TABLET ORAL at 08:10

## 2023-10-03 RX ADMIN — OXYCODONE HYDROCHLORIDE 10 MG: 10 TABLET ORAL at 09:10

## 2023-10-03 RX ADMIN — DIAZEPAM 5 MG: 5 TABLET ORAL at 05:10

## 2023-10-03 RX ADMIN — FAMOTIDINE 20 MG: 20 TABLET ORAL at 09:10

## 2023-10-03 RX ADMIN — GABAPENTIN 800 MG: 400 CAPSULE ORAL at 08:10

## 2023-10-03 NOTE — PLAN OF CARE
Problem: Adult Inpatient Plan of Care  Goal: Plan of Care Review  Outcome: Ongoing, Progressing  Goal: Patient-Specific Goal (Individualized)  Outcome: Ongoing, Progressing  Goal: Absence of Hospital-Acquired Illness or Injury  Outcome: Ongoing, Progressing  Goal: Optimal Comfort and Wellbeing  Outcome: Ongoing, Progressing     Problem: Bowel Elimination Impaired (Stroke, Hemorrhagic)  Goal: Effective Bowel Elimination  Outcome: Ongoing, Progressing     Problem: Pain (Stroke, Hemorrhagic)  Goal: Acceptable Pain Control  Outcome: Ongoing, Progressing     Problem: Urinary Elimination Impaired (Stroke, Hemorrhagic)  Goal: Effective Urinary Elimination  Outcome: Ongoing, Progressing     Problem: Skin Injury Risk Increased  Goal: Skin Health and Integrity  Outcome: Ongoing, Progressing        POC updated and reviewed with the patient at bedside. Questions regarding POC were encouraged and addressed. VSS, see flowsheets. Patient is AO x 4 at this time. Tele maintained per order. VISI on for additional monitoring. Pain management using PRNs, effective. See MAR for medication administration details. Fall/safety precautions maintained.  No signs of injury noted over night. Patient was repositioned for comfort with bed locked in low position, side rails up x 3, bed alarm on, and call light within reach. Instructed patient to call staff for mobility, verbalized understanding. No acute signs of distress noted at this time.

## 2023-10-03 NOTE — ASSESSMENT & PLAN NOTE
Per Neuro-crit care team: Hx of depression. 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 Pt's son committed suicide and her mother  after her son passed. Pt now lives alone and has minimal interaction with her 2 living children. Pt found to have several psychosomatic complaints at OSH related to medication. She stated she takes her home medications 1-2x per month as she is scared of the side effects.    Consider psych evaluation after acute period

## 2023-10-03 NOTE — SUBJECTIVE & OBJECTIVE
Interval History: still with significant lower extremity weakness and urinary retention s/p barber catheter insertion 10/2, discussed test results and recommendations - agreed with LP.     Review of Systems   Constitutional:  Negative for chills and fever.   Respiratory:  Negative for cough and shortness of breath.    Cardiovascular:  Negative for chest pain.   Gastrointestinal:  Negative for abdominal pain, nausea and vomiting.   All other systems reviewed and are negative.    Objective:     Vital Signs (Most Recent):  Temp: 98 °F (36.7 °C) (10/03/23 1149)  Pulse: 88 (10/03/23 1438)  Resp: 18 (10/03/23 1323)  BP: 135/79 (10/03/23 1149)  SpO2: 96 % (10/03/23 1149) Vital Signs (24h Range):  Temp:  [97.5 °F (36.4 °C)-98.6 °F (37 °C)] 98 °F (36.7 °C)  Pulse:  [65-88] 88  Resp:  [18-20] 18  SpO2:  [92 %-99 %] 96 %  BP: (122-135)/(60-79) 135/79     Weight: 91.6 kg (201 lb 15.1 oz)  Body mass index is 34.66 kg/m².    Intake/Output Summary (Last 24 hours) at 10/3/2023 1457  Last data filed at 10/3/2023 1225  Gross per 24 hour   Intake 240 ml   Output 1550 ml   Net -1310 ml         Physical Exam  Vitals and nursing note reviewed.   Constitutional:       General: She is not in acute distress.     Appearance: She is not toxic-appearing or diaphoretic.   HENT:      Head: Normocephalic and atraumatic.   Eyes:      General: No scleral icterus.     Extraocular Movements: Extraocular movements intact.   Pulmonary:      Effort: Pulmonary effort is normal. No respiratory distress.   Abdominal:      General: There is no distension.      Tenderness: There is no abdominal tenderness. There is no guarding.   Skin:     General: Skin is warm.   Neurological:      Mental Status: She is alert and oriented to person, place, and time.      Cranial Nerves: No cranial nerve deficit.   Psychiatric:         Behavior: Behavior normal.     Rectal tube and barber catheter        Significant Labs: All pertinent labs within the past 24 hours have been  reviewed.  CBC:   Recent Labs   Lab 10/02/23  0442 10/03/23  0500   WBC 4.56 4.67   HGB 11.9* 11.6*   HCT 35.9* 34.2*    253     CMP:   Recent Labs   Lab 10/02/23  0442 10/03/23  0500   * 131*   K 4.6 4.4    99   CO2 26 24   * 202*   BUN 8 9   CREATININE 0.6 0.6   CALCIUM 8.9 8.6*   PROT 5.7* 5.8*   ALBUMIN 2.1* 2.1*   BILITOT 0.4 0.5   ALKPHOS 137* 152*   AST 32 34   ALT 58* 52*   ANIONGAP 4* 8       Significant Imaging: I have reviewed all pertinent imaging results/findings within the past 24 hours.

## 2023-10-03 NOTE — ASSESSMENT & PLAN NOTE
66 yo F PMHx GERD, HTN, smoker, depression presenting with bilateral lower extremity paresis and paresthesia that began after heavy lifting on 9/18/23. OSH MRI cervical and thoracic spine revealed stenosis and T9-L1 central cord edema with mild enhancement. MRA spine w DWI inconclusive   Sudden onset with valsalva and evolution over 3 hours most suspicious for a vascular lesion, ddx includes demyelination and infectious processes    -serum NMO negative  -Spinal angiogram 10/2 - normal  -Neurosurgery recommends LP with Neurology consult, patient agreed with LP. Neurology consult.  -q4 neuro checks, sleep holiday  -permissive htn, aggressive bowel/bladder care, aggressive pulm toilet  -multimodal pain control  -pt/ot, diet, oob as able

## 2023-10-03 NOTE — PROGRESS NOTES
"[facility] ([facility]  Adult Nutrition  Progress Note    SUMMARY       Recommendations    1. Continue current regular diet.  2. If PO intake <50%, add Boost Plus BID.  3. RD following.    Goals: Will meet % EEN/EPN by next RD f/u.  Nutrition Goal Status: goal met  Communication of RD Recs:  (POC)    Assessment and Plan    Nutrition Problem  Inadequate oral intake     Related to (etiology):   Inability to consume sufficient needs     Signs and Symptoms (as evidenced by):   NPO status      Interventions/Recommendations (treatment strategy):  Collaboration of nutrition care with other providers      Nutrition Diagnosis Status:   Resolved    Reason for Assessment    Reason For Assessment: RD follow-up  Diagnosis:  (Spinal cord edema spanning T9-L1)  Relevant Medical History: NSTEMI, GERD, colitis  Interdisciplinary Rounds: did not attend  General Information Comments: RD f/u. Current PO intake 100%. No issues with n/v/d/c/chewing/swallowing. No wt hx per chart review; #. No s/s of malnutrition, appears well-nourished. LBM 10/2.   Nutrition Discharge Planning: Pending clinical course    Nutrition Risk Screen    Nutrition Risk Screen: no indicators present    Nutrition/Diet History    Spiritual, Cultural Beliefs, Yazidism Practices, Values that Affect Care: no  Food Allergies: other (see comments) (Latex)  Factors Affecting Nutritional Intake: None identified at this time    Anthropometrics    Temp: 98 °F (36.7 °C)  Height Method: Measured  Height: 5' 4" (162.6 cm)  Height (inches): 64 in  Weight Method: Bed Scale  Weight: 91.6 kg (201 lb 15.1 oz)  Weight (lb): 201.94 lb  Ideal Body Weight (IBW), Female: 120 lb  % Ideal Body Weight, Female (lb): 168.28 %  BMI (Calculated): 34.6  BMI Grade: 30 - 34.9- obesity - grade I    Lab/Procedures/Meds    Pertinent Labs Reviewed: reviewed  Pertinent Labs Comments: Sodium 131, Glucose 202, Calcium 8.6, Albumin 2.1, ALT 52, A1C 5.9%  Pertinent Medications " Reviewed: reviewed  Pertinent Medications Comments: atorvastatin, famotidine, gabapentin, heparin    Estimated/Assessed Needs    Weight Used For Calorie Calculations: 91.6 kg (201 lb 15.1 oz)  Energy Calorie Requirements (kcal): 1431 kcals  Energy Need Method: M[place]uke-St Jeor (MSJ x 1.0 PAL)  Protein Requirements: 64-73 g (0.7-0.8 g/kg)  Weight Used For Protein Calculations: 91.6 kg (201 lb 15.1 oz)  Fluid Requirements (mL): 1 mL/kcal or fluid per MD  Estimated Fluid Requirement Method: RDA Method  RDA Method (mL): 1431    Nutrition Prescription Ordered    Current Diet Order: Regular    Evaluation of Received Nutrient/Fluid Intake    I/O: -7.5 L since admit  Energy Calories Required: meeting needs  Protein Required: meeting needs  Tolerance: tolerating  % Intake of Estimated Energy Needs: 75 - 100 %  % Meal Intake: 75 - 100 %    Nutrition Risk    Level of Risk/Frequency of Follow-up:  (1 time/week)     Monitor and Evaluation    Food and Nutrient Intake: energy intake, food and beverage intake, enteral nutrition intake  Food and Nutrient Adminstration: diet order, enteral and parenteral nutrition administration  Knowledge/Beliefs/Attitudes: beliefs and attitudes, food and nutrition knowledge/skill  Physical Activity and Function: nutrition-related ADLs and IADLs  Anthropometric Measurements: body mass index, weight change, weight, height/length  Biochemical Data, Medical Tests and Procedures: lipid profile, inflammatory profile, glucose/endocrine profile, gastrointestinal profile, electrolyte and renal panel  Nutrition-Focused Physical Findings: overall appearance     Nutrition Follow-Up    RD Follow-up?: Yes    Emilie Colon RDN,LDN

## 2023-10-03 NOTE — PT/OT/SLP EVAL
Occupational Therapy  Co Evaluation w/ PT     Name: Rhina Forrest  MRN: [id number]  Admitting Diagnosis: Acute transverse myelitis  Recent Surgery: * No surgery found *      Recommendations:     Discharge Recommendations: rehabilitation facility  Discharge Equipment Recommendations:  to be determined by next level of care  Barriers to discharge:  None    Assessment:     Rhina Forrest is a 67 y.o. female with a medical diagnosis of Acute transverse myelitis.  She presents with decreased functional status secondary to medical diagnosis. Performance deficits affecting function: weakness, impaired sensation, impaired self care skills, gait instability, impaired functional mobility, impaired coordination, impaired balance, decreased upper extremity function, decreased lower extremity function, decreased ROM.  At this time patient demonstrates good functional ability with UE and is limited by LE decreased status. Patient unable to perform functional mobility this date secondary to LE deficits. Patient remains a candidate for acute OT services to focus on adaptive ADL strategies, strengthening, and activity tolerance to maximize patient independence in ADL tasks.     Rehab Prognosis: Fair; patient would benefit from acute skilled OT services to address these deficits and reach maximum level of function.       Plan:     Patient to be seen 4 x/week to address the above listed problems via self-care/home management, therapeutic exercises, therapeutic activities, neuromuscular re-education  Plan of Care Expires: 11/03/23  Plan of Care Reviewed with: patient    Subjective     Chief Complaint: none at this time   Patient/Family Comments/goals: Gain functional status     Occupational Profile:  Living Environment: Patient lives in a 1 story apt with rollator used to ambulate. Patient has a tub/shower with grab bars; patient stands in shower.   Previous level of function: Independent with Rolator   Roles and Routines: Unknown    Equipment Used at Home: rollator  Assistance upon Discharge: Unknown     Pain/Comfort:  Pain Rating 1: 0/10    Patients cultural, spiritual, [employer]ian conflicts given the current situation: no    Objective:     Communicated with: RN prior to session.  Patient found supine with telemetry, bowel management system, barber catheter, pressure relief boots, SCD, oxygen upon OT entry to room.    General Precautions: Standard, fall  Orthopedic Precautions: N/A  Braces: N/A  Respiratory Status: Room air    Occupational Performance:    Bed Mobility:    Patient completed Rolling/Turning to Left with  maximal assistance  Patient completed Scooting/Bridging with maximal assistance  Patient completed Supine to Sit with maximal assistance  Patient completed Sit to Supine with maximal assistance    Functional Mobility/Transfers:  Not completed at this time due to patient weakness in LE     Activities of Daily Living:  Grooming: minimum assistance while seated EOB  Upper Body Dressing: independence and minimum assistance while seated EOB  Lower Body Dressing: maximal assistance      Cognitive/Visual Perceptual:  Cognitive/Psychosocial Skills:     -       Oriented to: Person, Place, and Time   -       Follows Commands/attention:Follows two-step commands  -       Communication: clear/fluent  -       Safety awareness/insight to disability: intact   Visual/Perceptual:      -Intact      Physical Exam:  Upper Extremity Range of Motion:     -       Right Upper Extremity: WFL  -       Left Upper Extremity: WFL  Upper Extremity Strength:    -       Right Upper Extremity: WFL  -       Left Upper Extremity: WFL   Strength:    -       Right Upper Extremity: WFL  -       Left Upper Extremity: WFL  Fine Motor Coordination:    -       Intact    AMPAC 6 Click ADL:  AMPAC Total Score: 12    Treatment & Education:  Patient educated on OT role and POC. Co evaluation completed due to patient medical complexity and to promote patient safety.  Patient also educated on in bed UE isometric exeresis to promote UE strengthening to prepare patient for functional transfers.     Patient left supine with all lines intact, call button in reach, and rn notified    GOALS:   Multidisciplinary Problems       Occupational Therapy Goals          Problem: Occupational Therapy    Goal Priority Disciplines Outcome Interventions   Occupational Therapy Goal     OT, PT/OT Ongoing, Progressing    Description: Goals to be met by: 11/3/23    Patient will increase functional independence with ADLs by performing:    UE Dressing with [place].  LE Dressing with Moderate Assistance.  Grooming while seated at sink with Minimal Assistance.  Toileting from bedside commode with Moderate Assistance for hygiene and clothing management.   Toilet transfer to bedside commode with Moderate Assistance.                       History:     Past Medical History:   Diagnosis Date    Abnormal heart rhythm     Acid reflux     Bile duct perforation     Diverticular disease of large intestine without perforation or abscess     Essential (primary) hypertension     Fatigue     Gastritis     GERD (gastroesophageal reflux disease)     Hepatic dysfunction          Past Surgical History:   Procedure Laterality Date    CHOLECYSTECTOMY         Time Tracking:     OT Date of Treatment: 10/03/23  OT Start Time: 0955  OT Stop Time: 1028  OT Total Time (min): 33 min    Billable Minutes:Evaluation 15 minutes   Therapeutic Activity 18 minutes     10/3/2023

## 2023-10-03 NOTE — PT/OT/SLP EVAL
Physical Therapy Evaluation and Treatment    Patient Name: Rhina Forrest   MRN: [id number]  Recent Surgery: * No surgery found *    Co-evaluation w/ OT 2/2 suspected pt complexity and requirement of assistance from 2 skilled therapists to maximize treatment potential and maintain pt safety   Recommendations:     Discharge Recommendations: rehabilitation facility   Discharge Equipment Recommendations: to be determined by next level of care   Barriers to discharge: Increased level of assist    Assessment:     Rhina Forrest is a 67 y.o. female admitted with a medical diagnosis of Acute transverse myelitis. She presents with the following impairments/functional limitations: weakness, impaired sensation, impaired self care skills, impaired functional mobility, gait instability, impaired balance, decreased coordination, decreased upper extremity function, decreased lower extremity function, decreased safety awareness, pain, abnormal tone, decreased ROM, impaired coordination, impaired fine motor. Pt w/ no strength in BLE and decreased sensation in upper legs, w/ no sensation to light touch in lateral and posterior lower legs and B feet. Rec d/c to IPR for continued treatment in order to maximize functional return as pt w/ high motivation, good activity tolerance, and w/ potential for significant improvements in functional mobility.      Rehab Prognosis: Good; patient would benefit from acute skilled PT services to address these deficits and reach maximum level of function.  Recent Surgery: * No surgery found *      Plan:     During this hospitalization, patient to be seen 4 x/week to address the identified rehab impairments via therapeutic activities, therapeutic exercises, neuromuscular re-education, wheelchair management/training and progress toward the following goals:    Plan of Care Expires: 11/03/23    Subjective     Chief Complaint: weakness  Patient/Family Comments/Goals: pt wants to regain strength and figure  out what's going on with her  Pain/Comfort:  Pain Rating 1: 0/10  Pain Rating Post-Intervention 1: 0/10    Patients cultural, spiritual, Sikh conflicts given the current situation: no    Social History:  Living Environment: Patient lives alone in a first floor apartment, number of outside stairs: 0 .  Prior Level of Function: Prior to admission, patient was independent with ADLs, using rollator for ambulation.  Equipment Used at Home: rollator    DME owned (not currently used): none  Assistance Upon Discharge: family    Objective:     Communicated with RN prior to session. Patient found HOB elevated with telemetry, bowel management system, barber catheter, pressure relief boots, SCD, oxygen upon PT entry to room.    General Precautions: Standard, fall   Orthopedic Precautions:N/A   Braces: N/A  Respiratory Status: Room air    Exams:  Cognitive Exam: Patient is oriented to Person, Place, Time, Situation, follows commands 100% of the time  RLE ROM: WFL  RLE Strength:  0/5  LLE ROM: WFL  LLE Strength:  0/5  Sensation: -       Impaired  light/touch upper legs w/ dec sensation; lateral and posterior lower legs and B feet numb  Tone: hypotonic BLE    Functional Mobility:  Bed Mobility:  Scooting: maximal assistance of 2 persons  Supine to Sit: maximal assistance of 2 persons  Sit to Supine: maximal assistance of 2 persons  Balance:   Static Sitting: maximal assistance at EOB  Dynamic Sitting: total assistance at EOB    Therapeutic Activities and Exercises:  Patient educated on role of acute care PT and PT POC, safety while in hospital including calling nurse for mobility, and call light usage  Patient encountered with soiled bedding. Extra time spent changing linens and performing pericare with total assistance  EOB sitting balance w/ cueing and assistance for hand placement, core activation, and safety awareness; one trial of HHA to BUE w/ pt able to maintain balance and adjust COM to maintain upright posture for  30sec  Rolling L/R in bed to reposition pt w/ wedges and pillows to offload pressure, avoid muscle contractures, and prevent skin breakdown. Pt educated on importance of pt being rotated every 2hrs for these reasons.     Patient not clear to transfer with RN/PCT, medi-chair transfer via drawsheet only.    AM-PAC 6 CLICK MOBILITY  Turning over in bed (including adjusting bedclothes, sheets and blankets)?: 2  Sitting down on and standing up from a chair with arms (e.g., wheelchair, bedside commode, etc.): 1  Moving from lying on back to sitting on the side of the bed?: 2  Moving to and from a bed to a chair (including a wheelchair)?: 1  Need to walk in hospital room?: 1  Climbing 3-5 steps with a railing?: 1  Basic Mobility Total Score: 8     Patient left HOB elevated with all lines intact, call button in reach, RN notified, and bed alarm on.    GOALS:   Multidisciplinary Problems       Physical Therapy Goals          Problem: Physical Therapy    Goal Priority Disciplines Outcome Goal Variances Interventions   Physical Therapy Goal     PT, PT/OT Ongoing, Progressing     Description: Goals to be completed by: 11/3/23    Pt will perform sup<>sit transfers w/ minimum assistance  Pt will have sufficient dynamic balance to sit EOB w/ stand by assistance for 5min  Pt will be able to transfer from bed to w/c w/ modA using slideboard  Pt will be independent w/ HEP therex on BLE w/ good form and ROM   Pt will be independent w/ propelling w/c 100'                       History:     Past Medical History:   Diagnosis Date    Abnormal heart rhythm     Acid reflux     Bile duct perforation     Diverticular disease of large intestine without perforation or abscess     Essential (primary) hypertension     Fatigue     Gastritis     GERD (gastroesophageal reflux disease)     Hepatic dysfunction        Past Surgical History:   Procedure Laterality Date    CHOLECYSTECTOMY         Time Tracking:     PT Received On: 10/03/23  PT Start  Time: 0953  PT Stop Time: 1028  PT Total Time (min): 35 min     Billable Minutes: Evaluation 10 and Neuromuscular Re-education 25    10/03/2023

## 2023-10-03 NOTE — PLAN OF CARE
Problem: Occupational Therapy  Goal: Occupational Therapy Goal  Description: Goals to be met by: 11/3/23    Patient will increase functional independence with ADLs by performing:    UE Dressing with [place].  LE Dressing with Moderate Assistance.  Grooming while seated at sink with Minimal Assistance.  Toileting from bedside commode with Moderate Assistance for hygiene and clothing management.   Toilet transfer to bedside commode with Moderate Assistance.  10/3/2023 1226 by Lily Carter OT  Outcome: Ongoing, Progressing

## 2023-10-03 NOTE — PLAN OF CARE
Recommendations     1. Continue current regular diet.  2. If PO intake <50%, add Boost Plus BID.  3. RD following.     Goals: Will meet % EEN/EPN by next RD f/u.  Nutrition Goal Status: goal met  Communication of RD Recs:  (POC)    Emilie Costello RDN,LDN

## 2023-10-03 NOTE — PROGRESS NOTES
[facility] - Neuro[facility] ([facility])  [facility] Medicine  Progress Note    Patient Name: Rhina Forrest  MRN: [id number]  Patient Class: IP- Inpatient   Admission Date: 9/25/2023  Length of Stay: 8 days  Attending Physician: Katya Smtih MD  Primary Care Provider: Cal Alvarado FNP-C        Subjective:     Principal Problem:Acute transverse myelitis        HPI:  Copied from Neuro-crit care team:  68 yo F PMHx GERD, HTN, smoker, depression presenting with bilateral lower extremity plegia that began after heavy lifting on 9/18/23. Pt walks with a walker at baseline and lives at home alone. On 9/18 she was walking home with groceries on her walker and lifted the walker over a curb and hurt her back. Pt was able to walk home and put her groceries away. Once she sat on the couch, she was unable to get back up as her legs stopped moving. She also had decreased sensation from T12 down. Pt called 911 and was taken via EMS to [facility]. MRI cervical and thoracic spine revealed stenosis and T9-L1 cord edema with mild enhancement suspicious for evolving cord infarct vs demyelinating process per radiology report (no imaging was sent with patient). Pt was found to have NSTEMI with troponin 1.1 and was started on ASA and plavix. Pt also c/o bl upper quadrant abdominal pain and CT abdomen pelvis revealed colitis. She was started on cipro and flagyl. Pt has had a barber since 9/18 and has not had any bowel movements since 9/18 despite aggressive bowel regimen at OSH. Transferred to [facility] for neurosurgical evaluation and further workup. Pt currently c/o severe abdominal pain, n/v, severe back pain, BUE rash x 1 month, BLE plegia and T12 sensory level.          Overview/Hospital Course:  Neuro-crit care:  09/26/2023 naeon, improved abdominal pain w bowel movement, no change in exam  09/27/2023: NAEON. Antibiotics stopped. Pain control regimen adjusted. Patient refusing spinal angio today, will communicate with  Neuro IR to see if she can be moved till tomorrow.   09/28/2023 continued pain control issues, awaiting further diagnostic davis  9/30/2023- NAEON, added sq heparin, decrease statin d/t mild transaminitis  10/1/23: stepped down to HM  10/2/23: spinal angiogram - normal, NSGy recommended Neurology consult for LP and signed off.          Interval History: still with significant lower extremity weakness and urinary retention s/p barber catheter insertion 10/2, discussed test results and recommendations - agreed with LP.     Review of Systems   Constitutional:  Negative for chills and fever.   Respiratory:  Negative for cough and shortness of breath.    Cardiovascular:  Negative for chest pain.   Gastrointestinal:  Negative for abdominal pain, nausea and vomiting.   All other systems reviewed and are negative.    Objective:     Vital Signs (Most Recent):  Temp: 98 °F (36.7 °C) (10/03/23 1149)  Pulse: 88 (10/03/23 1438)  Resp: 18 (10/03/23 1323)  BP: 135/79 (10/03/23 1149)  SpO2: 96 % (10/03/23 1149) Vital Signs (24h Range):  Temp:  [97.5 °F (36.4 °C)-98.6 °F (37 °C)] 98 °F (36.7 °C)  Pulse:  [65-88] 88  Resp:  [18-20] 18  SpO2:  [92 %-99 %] 96 %  BP: (122-135)/(60-79) 135/79     Weight: 91.6 kg (201 lb 15.1 oz)  Body mass index is 34.66 kg/m².    Intake/Output Summary (Last 24 hours) at 10/3/2023 1457  Last data filed at 10/3/2023 1225  Gross per 24 hour   Intake 240 ml   Output 1550 ml   Net -1310 ml         Physical Exam  Vitals and nursing note reviewed.   Constitutional:       General: She is not in acute distress.     Appearance: She is not toxic-appearing or diaphoretic.   HENT:      Head: Normocephalic and atraumatic.   Eyes:      General: No scleral icterus.     Extraocular Movements: Extraocular movements intact.   Pulmonary:      Effort: Pulmonary effort is normal. No respiratory distress.   Abdominal:      General: There is no distension.      Tenderness: There is no abdominal tenderness. There is no guarding.    Skin:     General: Skin is warm.   Neurological:      Mental Status: She is alert and oriented to person, place, and time.      Cranial Nerves: No cranial nerve deficit.   Psychiatric:         Behavior: Behavior normal.     Rectal tube and barber catheter        Significant Labs: All pertinent labs within the past 24 hours have been reviewed.  CBC:   Recent Labs   Lab 10/02/23  0442 10/03/23  0500   WBC 4.56 4.67   HGB 11.9* 11.6*   HCT 35.9* 34.2*    253     CMP:   Recent Labs   Lab 10/02/23  0442 10/03/23  0500   * 131*   K 4.6 4.4    99   CO2 26 24   * 202*   BUN 8 9   CREATININE 0.6 0.6   CALCIUM 8.9 8.6*   PROT 5.7* 5.8*   ALBUMIN 2.1* 2.1*   BILITOT 0.4 0.5   ALKPHOS 137* 152*   AST 32 34   ALT 58* 52*   ANIONGAP 4* 8       Significant Imaging: I have reviewed all pertinent imaging results/findings within the past 24 hours.      Assessment/Plan:      * Acute transverse myelitis  68 yo F PMHx GERD, HTN, smoker, depression presenting with bilateral lower extremity paresis and paresthesia that began after heavy lifting on 9/18/23. OSH MRI cervical and thoracic spine revealed stenosis and T9-L1 central cord edema with mild enhancement. MRA spine w DWI inconclusive   Sudden onset with valsalva and evolution over 3 hours most suspicious for a vascular lesion, ddx includes demyelination and infectious processes    -serum NMO negative  -Spinal angiogram 10/2 - normal  -Neurosurgery recommends LP with Neurology consult, patient agreed with LP. Neurology consult.  -q4 neuro checks, sleep holiday  -permissive htn, aggressive bowel/bladder care, aggressive pulm toilet  -multimodal pain control  -pt/ot, diet, oob as able      Urinary retention  CIC while in ICU, had high bladder residual this morning >800 on nursing assessment therefore barber catheter insertion ordered  -maintain barber catheter until more mobile and pending LP      Debility  PT/OT evaluation      Colitis  Bl upper quadrant  abdominal pain and CT abdomen pelvis revealed colitis. She was started on cipro and flagyl. Has not had any bowel movements since  despite aggressive bowel regimen at OSH.   CT a/p  - Large stool ball within the rectum with a suggestion of some rectal wall thickening.  Mild presacral soft tissue thickening/edema.  Findings raise the question of possible proctitis.  Improved symptoms w bowel movement  Off abx by the time she stepped down to me, has rectal tube in  Bowel regimen stopped due to diarrhea  Rectal tube output - minimal - > discontinue rectal tube today      GERD (gastroesophageal reflux disease)  On famotidine 20 bid    Depression  Per Neuro-crit care team: Hx of depression. 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 Pt's son committed suicide and her mother  after her son passed. Pt now lives alone and has minimal interaction with her 2 living children. Pt found to have several psychosomatic complaints at OSH related to medication. She stated she takes her home medications 1-2x per month as she is scared of the side effects.    Consider psych evaluation after acute period       VTE Risk Mitigation (From admission, onward)         Ordered     heparin (porcine) injection 5,000 Units  Every 8 hours         23 0942     IP VTE HIGH RISK PATIENT  Once         23 1119     Place sequential compression device  Until discontinued         23 1119     Reason for No Pharmacological VTE Prophylaxis  Once        Question:  Reasons:  Answer:  Risk of Bleeding    23 1119                Discharge Planning   DEMETRICE: 10/5/2023     Code Status: Full Code   Is the patient medically ready for discharge?: No    Reason for patient still in hospital (select all that apply): Consult recommendations and PT / OT recommendations  Discharge Plan A: Home with family   Discharge Delays: None known at this time              Katya Smith MD  Department of Hospital Medicine    [facility] - Neurosurgery ([facility])

## 2023-10-03 NOTE — PLAN OF CARE
PT Eval complete, appropriate goals created    Problem: Physical Therapy  Goal: Physical Therapy Goal  Description: Goals to be completed by: 11/3/23    Pt will perform sup<>sit transfers w/ minimum assistance  Pt will have sufficient dynamic balance to sit EOB w/ stand by assistance for 5min  Pt will be able to transfer from bed to w/c w/ modA using slideboard  Pt will be independent w/ HEP therex on BLE w/ good form and ROM   Pt will be independent w/ propelling w/c 100'  Outcome: Ongoing, Progressing

## 2023-10-03 NOTE — ASSESSMENT & PLAN NOTE
Bl upper quadrant abdominal pain and CT abdomen pelvis revealed colitis. She was started on cipro and flagyl. Has not had any bowel movements since 9/18 despite aggressive bowel regimen at OSH.   CT a/p 9/26 - Large stool ball within the rectum with a suggestion of some rectal wall thickening.  Mild presacral soft tissue thickening/edema.  Findings raise the question of possible proctitis.  Improved symptoms w bowel movement  Off abx by the time she stepped down to me, has rectal tube in  Bowel regimen stopped due to diarrhea  Rectal tube output - minimal - > discontinue rectal tube today

## 2023-10-04 PROBLEM — G82.20 ACUTE PARAPLEGIA: Status: ACTIVE | Noted: 2023-09-25

## 2023-10-04 PROBLEM — G82.20 PARAPLEGIA: Status: ACTIVE | Noted: 2023-10-04

## 2023-10-04 LAB
ALBUMIN SERPL BCP-MCNC: 2.2 G/DL (ref 3.5–5.2)
ALP SERPL-CCNC: 145 U/L (ref 55–135)
ALT SERPL W/O P-5'-P-CCNC: 52 U/L (ref 10–44)
ANION GAP SERPL CALC-SCNC: 5 MMOL/L (ref 8–16)
AST SERPL-CCNC: 35 U/L (ref 10–40)
BASOPHILS # BLD AUTO: 0.03 K/UL (ref 0–0.2)
BASOPHILS NFR BLD: 0.7 % (ref 0–1.9)
BILIRUB SERPL-MCNC: 0.6 MG/DL (ref 0.1–1)
BUN SERPL-MCNC: 10 MG/DL (ref 8–23)
CALCIUM SERPL-MCNC: 8.8 MG/DL (ref 8.7–10.5)
CHLORIDE SERPL-SCNC: 98 MMOL/L (ref 95–110)
CO2 SERPL-SCNC: 27 MMOL/L (ref 23–29)
CREAT SERPL-MCNC: 0.5 MG/DL (ref 0.5–1.4)
DIFFERENTIAL METHOD: ABNORMAL
EOSINOPHIL # BLD AUTO: 0.1 K/UL (ref 0–0.5)
EOSINOPHIL NFR BLD: 2.4 % (ref 0–8)
ERYTHROCYTE [DISTWIDTH] IN BLOOD BY AUTOMATED COUNT: 12 % (ref 11.5–14.5)
EST. GFR  (NO RACE VARIABLE): >60 ML/MIN/1.73 M^2
GLUCOSE SERPL-MCNC: 153 MG/DL (ref 70–110)
HCT VFR BLD AUTO: 35.1 % (ref 37–48.5)
HGB BLD-MCNC: 11.8 G/DL (ref 12–16)
IMM GRANULOCYTES # BLD AUTO: 0.02 K/UL (ref 0–0.04)
IMM GRANULOCYTES NFR BLD AUTO: 0.5 % (ref 0–0.5)
LYMPHOCYTES # BLD AUTO: 0.9 K/UL (ref 1–4.8)
LYMPHOCYTES NFR BLD: 22.2 % (ref 18–48)
MAGNESIUM SERPL-MCNC: 1.9 MG/DL (ref 1.6–2.6)
MCH RBC QN AUTO: 31.6 PG (ref 27–31)
MCHC RBC AUTO-ENTMCNC: 33.6 G/DL (ref 32–36)
MCV RBC AUTO: 94 FL (ref 82–98)
MONOCYTES # BLD AUTO: 0.5 K/UL (ref 0.3–1)
MONOCYTES NFR BLD: 11 % (ref 4–15)
NEUTROPHILS # BLD AUTO: 2.7 K/UL (ref 1.8–7.7)
NEUTROPHILS NFR BLD: 63.2 % (ref 38–73)
NRBC BLD-RTO: 0 /100 WBC
PHOSPHATE SERPL-MCNC: 3.5 MG/DL (ref 2.7–4.5)
PLATELET # BLD AUTO: 241 K/UL (ref 150–450)
PMV BLD AUTO: 9.1 FL (ref 9.2–12.9)
POTASSIUM SERPL-SCNC: 4.4 MMOL/L (ref 3.5–5.1)
PROT SERPL-MCNC: 6.1 G/DL (ref 6–8.4)
RBC # BLD AUTO: 3.74 M/UL (ref 4–5.4)
SODIUM SERPL-SCNC: 130 MMOL/L (ref 136–145)
WBC # BLD AUTO: 4.19 K/UL (ref 3.9–12.7)

## 2023-10-04 PROCEDURE — 97112 NEUROMUSCULAR REEDUCATION: CPT | Mod: CO

## 2023-10-04 PROCEDURE — 11000001 HC ACUTE MED/SURG PRIVATE ROOM

## 2023-10-04 PROCEDURE — 99232 SBSQ HOSP IP/OBS MODERATE 35: CPT | Mod: ,,, | Performed by: HOSPITALIST

## 2023-10-04 PROCEDURE — 99223 1ST HOSP IP/OBS HIGH 75: CPT | Mod: GC,,, | Performed by: STUDENT IN AN ORGANIZED HEALTH CARE EDUCATION/TRAINING PROGRAM

## 2023-10-04 PROCEDURE — 85025 COMPLETE CBC W/AUTO DIFF WBC: CPT | Performed by: NURSE PRACTITIONER

## 2023-10-04 PROCEDURE — 97535 SELF CARE MNGMENT TRAINING: CPT | Mod: CO

## 2023-10-04 PROCEDURE — 99222 1ST HOSP IP/OBS MODERATE 55: CPT | Mod: ,,, | Performed by: NURSE PRACTITIONER

## 2023-10-04 PROCEDURE — 80053 COMPREHEN METABOLIC PANEL: CPT | Performed by: NURSE PRACTITIONER

## 2023-10-04 PROCEDURE — 99232 PR SUBSEQUENT HOSPITAL CARE,LEVL II: ICD-10-PCS | Mod: ,,, | Performed by: HOSPITALIST

## 2023-10-04 PROCEDURE — 99222 PR INITIAL HOSPITAL CARE,LEVL II: ICD-10-PCS | Mod: ,,, | Performed by: NURSE PRACTITIONER

## 2023-10-04 PROCEDURE — 97530 THERAPEUTIC ACTIVITIES: CPT | Mod: CO

## 2023-10-04 PROCEDURE — 36415 COLL VENOUS BLD VENIPUNCTURE: CPT | Performed by: NURSE PRACTITIONER

## 2023-10-04 PROCEDURE — 25000003 PHARM REV CODE 250: Performed by: NURSE PRACTITIONER

## 2023-10-04 PROCEDURE — 99223 PR INITIAL HOSPITAL CARE,LEVL III: ICD-10-PCS | Mod: GC,,, | Performed by: STUDENT IN AN ORGANIZED HEALTH CARE EDUCATION/TRAINING PROGRAM

## 2023-10-04 PROCEDURE — 97530 THERAPEUTIC ACTIVITIES: CPT

## 2023-10-04 PROCEDURE — 94761 N-INVAS EAR/PLS OXIMETRY MLT: CPT

## 2023-10-04 PROCEDURE — 84100 ASSAY OF PHOSPHORUS: CPT | Performed by: NURSE PRACTITIONER

## 2023-10-04 PROCEDURE — 97112 NEUROMUSCULAR REEDUCATION: CPT

## 2023-10-04 PROCEDURE — 97110 THERAPEUTIC EXERCISES: CPT

## 2023-10-04 PROCEDURE — 83735 ASSAY OF MAGNESIUM: CPT | Performed by: NURSE PRACTITIONER

## 2023-10-04 PROCEDURE — 63600175 PHARM REV CODE 636 W HCPCS: Performed by: NURSE PRACTITIONER

## 2023-10-04 RX ORDER — LIDOCAINE HYDROCHLORIDE 10 MG/ML
10 INJECTION INFILTRATION; PERINEURAL ONCE
Status: DISCONTINUED | OUTPATIENT
Start: 2023-10-04 | End: 2023-10-04

## 2023-10-04 RX ORDER — MORPHINE SULFATE 2 MG/ML
2 INJECTION, SOLUTION INTRAMUSCULAR; INTRAVENOUS ONCE
Status: COMPLETED | OUTPATIENT
Start: 2023-10-05 | End: 2023-10-05

## 2023-10-04 RX ADMIN — HEPARIN SODIUM 5000 UNITS: 5000 INJECTION INTRAVENOUS; SUBCUTANEOUS at 08:10

## 2023-10-04 RX ADMIN — POLYETHYLENE GLYCOL 3350 17 G: 17 POWDER, FOR SOLUTION ORAL at 08:10

## 2023-10-04 RX ADMIN — OXYCODONE HYDROCHLORIDE 10 MG: 10 TABLET ORAL at 02:10

## 2023-10-04 RX ADMIN — FAMOTIDINE 20 MG: 20 TABLET ORAL at 08:10

## 2023-10-04 RX ADMIN — DIAZEPAM 5 MG: 5 TABLET ORAL at 05:10

## 2023-10-04 RX ADMIN — GABAPENTIN 800 MG: 400 CAPSULE ORAL at 02:10

## 2023-10-04 RX ADMIN — GABAPENTIN 800 MG: 400 CAPSULE ORAL at 08:10

## 2023-10-04 RX ADMIN — ATORVASTATIN CALCIUM 20 MG: 20 TABLET, FILM COATED ORAL at 08:10

## 2023-10-04 RX ADMIN — OXYCODONE HYDROCHLORIDE 10 MG: 10 TABLET ORAL at 05:10

## 2023-10-04 RX ADMIN — HEPARIN SODIUM 5000 UNITS: 5000 INJECTION INTRAVENOUS; SUBCUTANEOUS at 05:10

## 2023-10-04 RX ADMIN — HEPARIN SODIUM 5000 UNITS: 5000 INJECTION INTRAVENOUS; SUBCUTANEOUS at 02:10

## 2023-10-04 NOTE — ASSESSMENT & PLAN NOTE
66 y/o female presented as a direct transfer from OSH to Neuro-ICU for hourly neuro-checks 9/25 with sudden onset bilateral lower extremity paresis, urinary retention and paresthesia that began after heavy lifting on 9/18/23.   OSH MRI cervical and thoracic spine revealed stenosis and T9-L1 central cord edema with mild enhancement. MRI Brain w/wo 9/25: patchy periventricular white matter T2/flair hyperintensities demylination vs microvascular disease  MRI C/T/L spine 9/25: Abnormal cord edema from T9 to conus  MRI spine demyelinating/MRA: possible diffusion restriction in distal thoracic spine/conus, non diagnostic MRA    --Cord findings suspicious for mass vs ischemia vs demyelinating disorder  -serum NMO negative  -Spinal angiogram to evaluate for possible cord ischemia 10/2 - normal   -Neurosurgery signed off, recommends LP with Neurology consult. Neurology decided to post-pone LP and to repeat spinal MRI - ordered today, f/u results.     PT/OT - inpatient rehab

## 2023-10-04 NOTE — PT/OT/SLP PROGRESS
Physical Therapy Treatment    Patient Name:  Rhina Forrest   MRN:  [id number]  Rehab technician utilized to assist w/ treatment session 2/2 pt requiring two person assist for functional mobility   Recommendations:     Discharge Recommendations: rehabilitation facility  Discharge Equipment Recommendations: to be determined by next level of care  Barriers to discharge: Inaccessible home and Decreased caregiver support    Assessment:     Rhina Forrest is a 67 y.o. female admitted with a medical diagnosis of Acute transverse myelitis.  She presents with the following impairments/functional limitations: weakness, impaired sensation, impaired self care skills, impaired functional mobility, impaired balance, decreased coordination, decreased upper extremity function, decreased lower extremity function, decreased safety awareness, pain, abnormal tone, decreased ROM, impaired coordination, impaired fine motor. Pt progressing w/ functional mobility, able to hold herself upright w/ CGA using BUE on bed rails when sitting EOB. Today she began to work on using BUE to move BLE in bed w/ maxA required.    Rehab Prognosis: Good; patient would benefit from acute skilled PT services to address these deficits and reach maximum level of function.    Recent Surgery: * No surgery found *      Plan:     During this hospitalization, patient to be seen 4 x/week to address the identified rehab impairments via therapeutic activities, therapeutic exercises, neuromuscular re-education, wheelchair management/training and progress toward the following goals:    Plan of Care Expires:  11/03/23    Subjective     Chief Complaint: pain in back and stomach w/ movement (5/10) but none at rest  Patient/Family Comments/goals: pt wants to find out what's wrong with her and get back to PLOF  Pain/Comfort:  Pain Rating 1: 0/10  Pain Rating Post-Intervention 1: 0/10      Objective:     Communicated with Rn prior to session.  Patient found HOB elevated  with telemetry, barber catheter, pressure relief boots, SCD upon PT entry to room.     General Precautions: Standard, fall  Orthopedic Precautions: N/A  Braces: N/A  Respiratory Status: Room air     Functional Mobility:  Bed Mobility:     Rolling Left:  maximal assistance  Rolling Right: maximal assistance  Scooting: maximal assistance and of 2 persons  Supine to Sit: maximal assistance  Sit to Supine: maximal assistance  Balance: EOB sitting total A w/out UE support; modA w/ unilateral UE support, and CGA w/ BUE support      AM-PAC 6 CLICK MOBILITY  Turning over in bed (including adjusting bedclothes, sheets and blankets)?: 2  Sitting down on and standing up from a chair with arms (e.g., wheelchair, bedside commode, etc.): 1  Moving from lying on back to sitting on the side of the bed?: 2  Moving to and from a bed to a chair (including a wheelchair)?: 1  Need to walk in hospital room?: 1  Climbing 3-5 steps with a railing?: 1  Basic Mobility Total Score: 8       Treatment & Education:  Pt educated on PT POC/goals, d/c recs, and continued treatment. All questions answered and pt in agreement w/ POC.  Practice w/ LE management w/ assistance required to lean trunk forward to reach legs and to lift BLE 2/2 dec strength and pain  Sitting balance x30min w/ cueing for maintaining BUE on railings for support; TE performed: 2x5 tricep pushups; 1x10 modified sit ups; PROM BLE 3x10   Rolling in bed 2/2 pt soiled w/ extra time spent performing pericare total A and changing linens  Rolling L/R in bed to reposition pt w/ wedges and pillows to offload pressure, avoid muscle contractures, and prevent skin breakdown. Pt educated on importance of pt being rotated every 2hrs for these reasons.     Patient left HOB elevated with all lines intact, call button in reach, bed alarm on, and RN notified..    GOALS:   Multidisciplinary Problems       Physical Therapy Goals          Problem: Physical Therapy    Goal Priority Disciplines  Outcome Goal Variances Interventions   Physical Therapy Goal     PT, PT/OT Ongoing, Progressing     Description: Goals to be completed by: 11/3/23    Pt will perform sup<>sit transfers w/ minimum assistance  Pt will have sufficient dynamic balance to sit EOB w/ stand by assistance for 5min  Pt will be able to transfer from bed to w/c w/ modA using slideboard  Pt will be independent w/ HEP therex on BLE w/ good form and ROM   Pt will be independent w/ propelling w/c 100'                       Time Tracking:     PT Received On: 10/04/23  PT Start Time: 0913     PT Stop Time: 0954  PT Total Time (min): 41 min     Billable Minutes: Therapeutic Activity 15, Therapeutic Exercise 15, and Neuromuscular Re-education 11    Treatment Type: Treatment  PT/PTA: PT     Number of PTA visits since last PT visit: 0     10/04/2023

## 2023-10-04 NOTE — SUBJECTIVE & OBJECTIVE
Past Medical History:   Diagnosis Date    Abnormal heart rhythm     Acid reflux     Bile duct perforation     Diverticular disease of large intestine without perforation or abscess     Essential (primary) hypertension     Fatigue     Gastritis     GERD (gastroesophageal reflux disease)     Hepatic dysfunction      Past Surgical History:   Procedure Laterality Date    CHOLECYSTECTOMY       Review of patient's allergies indicates:   Allergen Reactions    Acetaminophen Shortness Of Breath    Azithromycin Shortness Of Breath    Amlodipine      hypertension    Androgenic anabolic steroid      hypertension    Bisoprolol Other (See Comments)     hypertension    Buspirone Other (See Comments)     hypertension    Cortisone Dermatitis     Burning skin feeling     Erythromycin base Hives    Ibuprofen Other (See Comments)     Hypertension      Immune globulin,gamma caprylate(igg) Hives     All Gamma globulins    Latex, natural rubber Itching    Levaquin [levofloxacin] Other (See Comments)     Fever      Losartan Other (See Comments)     hypertension    Penicillins Hives    Wellbutrin [bupropion hcl] Other (See Comments)     depression    Adhesive Rash    Hydrochlorothiazide Rash    Macrobid [nitrofurantoin monohyd/m-cryst] Rash       Scheduled Medications:    atorvastatin  20 mg Oral Daily    famotidine  20 mg Oral BID    gabapentin  800 mg Oral TID    heparin (porcine)  5,000 Units Subcutaneous Q8H    polyethylene glycol  17 g Oral Daily       PRN Medications: diazePAM, ondansetron, oxyCODONE, oxyCODONE, simethicone, sodium chloride 0.9%, sodium chloride 0.9%    Family History    None       Tobacco Use    Smoking status: Not on file    Smokeless tobacco: Not on file   Substance and Sexual Activity    Alcohol use: Not on file    Drug use: Not on file    Sexual activity: Not on file     Review of Systems   Constitutional:  Positive for activity change.   Respiratory:  Negative for cough and shortness of breath.     Cardiovascular:  Negative for chest pain and leg swelling.   Musculoskeletal:  Positive for gait problem.   Neurological:  Positive for weakness.     Objective:     Vital Signs (Most Recent):  Temp: 98.8 °F (37.1 °C) (10/04/23 0819)  Pulse: 79 (10/04/23 0819)  Resp: 18 (10/04/23 0819)  BP: (!) 131/58 (10/04/23 0819)  SpO2: 95 % (10/04/23 0819)    Vital Signs (24h Range):  Temp:  [97.5 °F (36.4 °C)-99.2 °F (37.3 °C)] 98.8 °F (37.1 °C)  Pulse:  [76-88] 79  Resp:  [18-23] 18  SpO2:  [91 %-97 %] 95 %  BP: (125-139)/(55-81) 131/58     Body mass index is 34.66 kg/m².     Physical Exam  Vitals and nursing note reviewed.   Constitutional:       General: She is awake.   HENT:      Head: Normocephalic and atraumatic.   Pulmonary:      Effort: Pulmonary effort is normal. No respiratory distress.   Abdominal:      Palpations: Abdomen is soft.   Musculoskeletal:      Cervical back: Normal range of motion and neck supple.      Comments: BLE plegia.     Full strength to BUE   Skin:     General: Skin is warm and dry.      Capillary Refill: Capillary refill takes 2 to 3 seconds.   Neurological:      General: No focal deficit present.      Mental Status: She is alert and oriented to person, place, and time.      GCS: GCS eye subscore is 4. GCS verbal subscore is 5. GCS motor subscore is 6.      Motor: Weakness present.      Coordination: Heel to Shin Test abnormal. Impaired rapid alternating movements.      Gait: Gait abnormal.   Psychiatric:         Attention and Perception: Attention normal.         Mood and Affect: Mood normal.         Behavior: Behavior normal. Behavior is cooperative.          NEUROLOGICAL EXAMINATION:     MENTAL STATUS   Oriented to person, place, and time.       Diagnostic Results: Labs: Reviewed

## 2023-10-04 NOTE — PT/OT/SLP PROGRESS
"Occupational Therapy   Treatment    Name: Rhina Forrest  MRN: [id number]  Admitting Diagnosis:  Acute transverse myelitis       Recommendations:     Discharge Recommendations: rehabilitation facility  Discharge Equipment Recommendations:  to be determined by next level of care  Barriers to discharge:  None    Assessment:     Rhina Forrest is a 67 y.o. female with a medical diagnosis of Acute transverse myelitis.  She presents with the following performance deficits affecting function are weakness, gait instability, impaired endurance, impaired balance, impaired self care skills, impaired functional mobility, decreased coordination, abnormal tone, decreased ROM, decreased lower extremity function, decreased safety awareness, impaired cardiopulmonary response to activity, impaired coordination, pain.     Rehab Prognosis:  Good; patient would benefit from acute skilled OT services to address these deficits and reach maximum level of function.       Plan:     Patient to be seen 4 x/week to address the above listed problems via self-care/home management, therapeutic exercises, therapeutic activities, neuromuscular re-education  Plan of Care Expires: 11/03/23  Plan of Care Reviewed with: patient    Subjective     Chief Complaint: "Everyone keeps coming in after everyone."  Patient/Family Comments/goals: "I want to go back to taking care of myself."  Pain/Comfort:  Pain Rating 1: 8/10  Location - Orientation 1: lower  Location 1: back  Pain Addressed 1: Nurse notified, Reposition, Distraction  Pain Rating Post-Intervention 1: 8/10  Pain Rating 2: 8/10  Location - Orientation 2: generalized  Location 2: abdomen  Pain Addressed 2: Reposition, Distraction, Nurse notified  Pain Rating Post-Intervention 2: 8/10    Objective:     Communicated with: nurse del angel and OTR prior to session.  Patient found HOB elevated with bed alarm, barber catheter, telemetry, SCD, pressure relief boots, pulse ox (continuous) upon OT entry to " room.  A client care conference was completed by the OTR and the LEON prior to treatment by the LEON to discuss the patient's POC and current status.    General Precautions: Standard, fall    Orthopedic Precautions:N/A  Braces: N/A  Respiratory Status: Room air     Occupational Performance:     Bed Mobility:    Patient completed Rolling/Turning to Left with  moderate assistance, with side rail, and BLE management  Patient completed Rolling/Turning to Right with moderate assistance, with side rail, and BLE management  Patient completed Scooting anteriorly to EOB with maximal assistance  Patient completed Supine to Sit with maximal assistance, with side rail, and trunk and BLE management  Patient completed Sit to Supine with moderate assistance, with side rail, and HOB flat with BLE management     Functional Mobility/Transfers:  Functional Mobility: Patient tolerated ~15 min seated EOB requiring Max(A) for static sitting balance    Activities of Daily Living:  Grooming: contact guard assistance facial hygiene seated EOB (patient req'd Total(A) for dynamic sitting balance)  Lower Body Dressing: dependence don B socks seated EOB  Toileting: total assistance (patient became incontinent during bed mobility requiring perirectal hygiene in log roll)      [facility] 6 Click ADL: 10    Treatment & Education:  Patient educated on OT POC, goals, and current progress  Pt participated in static sitting activity with focus on weight shifting and core activation required for improved postural control; patient with improved anterior shift to maintain midline and demonstrated self righting reflex when excessive posterior lean x10 reps modified sit ups  Addressed all patient questions/concerns within LEON scope of practice.     Patient left HOB elevated with all lines intact, call button in reach, and nurse notified    GOALS:   Multidisciplinary Problems       Occupational Therapy Goals          Problem: Occupational Therapy    Goal  Priority Disciplines Outcome Interventions   Occupational Therapy Goal     OT, PT/OT Ongoing, Progressing    Description: Goals to be met by: 11/3/23    Patient will increase functional independence with ADLs by performing:    UE Dressing with [place].  LE Dressing with Moderate Assistance.  Grooming while seated at sink with Minimal Assistance.  Toileting from bedside commode with Moderate Assistance for hygiene and clothing management.   Toilet transfer to bedside commode with Moderate Assistance.                       Time Tracking:     OT Date of Treatment: 10/04/23  OT Start Time: 1424  OT Stop Time: 1508  OT Total Time (min): 44 min    Billable Minutes:Self Care/Home Management 14  Therapeutic Activity 18  Neuromuscular Re-education 12    OT/RAHUL: RAHUL     Number of RAHUL visits since last OT visit: 1    10/4/2023

## 2023-10-04 NOTE — PLAN OF CARE
Problem: Adult Inpatient Plan of Care  Goal: Plan of Care Review  Outcome: Ongoing, Progressing  Goal: Patient-Specific Goal (Individualized)  Outcome: Ongoing, Progressing  Goal: Absence of Hospital-Acquired Illness or Injury  Outcome: Ongoing, Progressing  Goal: Optimal Comfort and Wellbeing  Outcome: Ongoing, Progressing  Goal: Readiness for Transition of Care  Outcome: Ongoing, Progressing     Problem: Adjustment to Illness (Stroke, Hemorrhagic)  Goal: Optimal Coping  Outcome: Ongoing, Progressing     Problem: Bowel Elimination Impaired (Stroke, Hemorrhagic)  Goal: Effective Bowel Elimination  Outcome: Ongoing, Progressing     Problem: Cerebral Tissue Perfusion (Stroke, Hemorrhagic)  Goal: Optimal Cerebral Tissue Perfusion  Outcome: Ongoing, Progressing     Problem: Cognitive Impairment (Stroke, Hemorrhagic)  Goal: Optimal Cognitive Function  Outcome: Ongoing, Progressing     Problem: Communication Impairment (Stroke, Hemorrhagic)  Goal: Effective Communication Skills  Outcome: Ongoing, Progressing     Problem: Functional Ability Impaired (Stroke, Hemorrhagic)  Goal: Optimal Functional Ability  Outcome: Ongoing, Progressing     Problem: Pain (Stroke, Hemorrhagic)  Goal: Acceptable Pain Control  Outcome: Ongoing, Progressing     Problem: Respiratory Compromise (Stroke, Hemorrhagic)  Goal: Effective Oxygenation and Ventilation  Outcome: Ongoing, Progressing     Problem: Sensorimotor Impairment (Stroke, Hemorrhagic)  Goal: Improved Sensorimotor Function  Outcome: Ongoing, Progressing     Problem: Swallowing Impairment (Stroke, Hemorrhagic)  Goal: Optimal Eating and Swallowing Without Aspiration  Outcome: Ongoing, Progressing     Problem: Urinary Elimination Impaired (Stroke, Hemorrhagic)  Goal: Effective Urinary Elimination  Outcome: Ongoing, Progressing     Problem: Skin Injury Risk Increased  Goal: Skin Health and Integrity  Outcome: Ongoing, Progressing     Problem: Impaired Wound Healing  Goal: Optimal Wound  Healing  Outcome: Ongoing, Progressing     Problem: Infection  Goal: Absence of Infection Signs and Symptoms  Outcome: Ongoing, Progressing

## 2023-10-04 NOTE — HOSPITAL COURSE
Per chart review,    OT- 10/03    Bed Mobility:    Patient completed Rolling/Turning to Left with  maximal assistance  Patient completed Scooting/Bridging with maximal assistance  Patient completed Supine to Sit with maximal assistance  Patient completed Sit to Supine with maximal assistance     Functional Mobility/Transfers:  Not completed at this time due to patient weakness in LE      Activities of Daily Living:  Grooming: minimum assistance while seated EOB  Upper Body Dressing: independence and minimum assistance while seated EOB  Lower Body Dressing: maximal assistance       PT-10/03    Functional Mobility:  Bed Mobility:  Scooting: maximal assistance of 2 persons  Supine to Sit: maximal assistance of 2 persons  Sit to Supine: maximal assistance of 2 persons

## 2023-10-04 NOTE — ANESTHESIA POSTPROCEDURE EVALUATION
Anesthesia Post Evaluation    Patient: Rhina Forrest    Procedure(s) Performed: * No procedures listed *    Final Anesthesia Type: general      Patient location during evaluation: PACU  Patient participation: Yes- Able to Participate  Level of consciousness: awake and alert  Post-procedure vital signs: reviewed and stable  Pain management: adequate  Airway patency: patent    PONV status at discharge: No PONV  Anesthetic complications: no      Cardiovascular status: hemodynamically stable  Respiratory status: unassisted, spontaneous ventilation and room air  Hydration status: euvolemic  Follow-up not needed.          Vitals Value Taken Time   /59 10/04/23 1458   Temp 37.1 °C (98.8 °F) 10/04/23 0819   Pulse 103 10/04/23 1458   Resp 23 10/04/23 1458   SpO2 97 % 10/04/23 1458   Vitals shown include unvalidated device data.      Event Time   Out of Recovery 10/02/2023 13:03:00         Pain/Giorgio Score: Pain Rating Prior to Med Admin: 9 (10/4/2023  2:34 PM)  Pain Rating Post Med Admin: 3 (10/3/2023  2:02 PM)

## 2023-10-04 NOTE — CONSULTS
Thaddeus Christy - Neurosurgery ([facility])  Neurology  Consult Note    Patient Name: Rhina Forrest  MRN: [id number]  Admission Date: 9/25/2023  Hospital Length of Stay: 9 days  Code Status: Full Code   Attending Provider: Katya Smith MD   Consulting Provider: Lakshmi White PA-C  Primary Care Physician: Cal Alvarado FNP-C  Principal Problem:Acute transverse myelitis    Inpatient consult to Neurology  Consult performed by: Lakshmi White PA-C  Consult ordered by: Katya Smith MD         Subjective:     Chief Complaint: ?TM, LP    HPI:   67 y.o. female with HTN, GERD, depression and tobacco abuse presented 9/25/23 with BLE plegia that began abruptly after lifting her rollator with groceries. Pt reports using a rollator x 3 years after a L knee injury (ACL, meniscus). She walked to [employer] to get groceries and tried to lift her walker and felt a pop. She knew she injured something in her back. She was able to make it home and walk to the bathroom, felt weak getting off the toilet, but was able to make it to her couch. Once lying down on the sofa, she was unable to get back up and called 911. Says over a matter of 3 hours, she was unable to move her legs at all and had abnormal sensation from waist down including lack of urge to urinate and have a bowel movement. She was brought to [facility] and underwent MRI neuro-axis showing T9-L1 central cord edema. CT Ab/pelvis also showed colitis and she was treated with cipro and flagyl. She was transferred to [facility] for higher level of care/NSGY evaluation. Underwent repeating imaging of neuro-axis and spinal angiogram (10/2) that was unremarkable. Neurology consulted 10/4 for evaluation of transverse myelitis and consideration for LP. Pt still with ongoing BLE plegia, urinary retention s/p barber catheter.     Past Medical History:   Diagnosis Date    Abnormal heart rhythm     Acid reflux     Bile duct perforation     Diverticular disease of large  intestine without perforation or abscess     Essential (primary) hypertension     Fatigue     Gastritis     GERD (gastroesophageal reflux disease)     Hepatic dysfunction      Past Surgical History:   Procedure Laterality Date    CHOLECYSTECTOMY       Review of patient's allergies indicates:   Allergen Reactions    Acetaminophen Shortness Of Breath    Azithromycin Shortness Of Breath    Amlodipine      hypertension    Androgenic anabolic steroid      hypertension    Bisoprolol Other (See Comments)     hypertension    Buspirone Other (See Comments)     hypertension    Cortisone Dermatitis     Burning skin feeling     Erythromycin base Hives    Ibuprofen Other (See Comments)     Hypertension      Immune globulin,gamma caprylate(igg) Hives     All Gamma globulins    Latex, natural rubber Itching    Levaquin [levofloxacin] Other (See Comments)     Fever      Losartan Other (See Comments)     hypertension    Penicillins Hives    Wellbutrin [bupropion hcl] Other (See Comments)     depression    Adhesive Rash    Hydrochlorothiazide Rash    Macrobid [nitrofurantoin monohyd/m-cryst] Rash     No current facility-administered medications on file prior to encounter.     No current outpatient medications on file prior to encounter.     Family History    None       Tobacco Use    Smoking status: Not on file    Smokeless tobacco: Not on file   Substance and Sexual Activity    Alcohol use: Not on file    Drug use: Not on file    Sexual activity: Not on file     Review of Systems   Constitutional:  Positive for activity change and fatigue. Negative for fever.   HENT:  Negative for trouble swallowing and voice change.    Eyes:  Negative for visual disturbance.   Respiratory:  Negative for shortness of breath.    Cardiovascular:  Negative for chest pain.   Gastrointestinal:  Negative for nausea and vomiting.   Genitourinary:  Positive for difficulty urinating.   Musculoskeletal:  Positive for back pain and  gait problem. Negative for neck stiffness.   Neurological:  Positive for weakness and numbness. Negative for dizziness, tremors, seizures, syncope, facial asymmetry, speech difficulty, light-headedness and headaches.   Psychiatric/Behavioral:  Positive for dysphoric mood.      Objective:     Vital Signs (Most Recent):  Temp: 97.2 °F (36.2 °C) (10/04/23 1517)  Pulse: 87 (10/04/23 1517)  Resp: 18 (10/04/23 1517)  BP: (!) 129/55 (10/04/23 1517)  SpO2: (!) 94 % (10/04/23 1517) Vital Signs (24h Range):  Temp:  [97.2 °F (36.2 °C)-99.2 °F (37.3 °C)] 97.2 °F (36.2 °C)  Pulse:  [73-88] 87  Resp:  [16-23] 18  SpO2:  [91 %-97 %] 94 %  BP: (125-136)/(55-77) 129/55     Weight: 91.6 kg (201 lb 15.1 oz)  Body mass index is 34.66 kg/m².     Physical Exam  Eyes:      Extraocular Movements: EOM normal.      Pupils: Pupils are equal, round, and reactive to light.   Neurological:      Mental Status: She is oriented to person, place, and time.      Coordination: Finger-Nose-Finger Test normal.      Deep Tendon Reflexes:      Reflex Scores:       Bicep reflexes are 2+ on the right side and 2+ on the left side.       Brachioradialis reflexes are 2+ on the right side and 2+ on the left side.       Patellar reflexes are 1+ on the right side and 1+ on the left side.       Achilles reflexes are 0 on the right side and 0 on the left side.  Psychiatric:         Speech: Speech normal.          NEUROLOGICAL EXAMINATION:     MENTAL STATUS   Oriented to person, place, and time.   Follows 2 step commands.   Attention: normal. Concentration: normal.   Speech: speech is normal   Level of consciousness: alert  Knowledge: good.   Normal comprehension.     CRANIAL NERVES     CN III, IV, VI   Pupils are equal, round, and reactive to light.  Extraocular motions are normal.   Nystagmus: none   Diplopia: none  Ophthalmoparesis: none    CN V   Facial sensation intact.     CN VII   Facial expression full, symmetric.     CN VIII   Hearing: intact    CN IX, X    Palate: symmetric    CN XI   CN XI normal.     CN XII   CN XII normal.     MOTOR EXAM   Muscle bulk: normal  Overall muscle tone: normal    Strength   Right deltoid: 5/5  Left deltoid: 5/5  Right biceps: 5/5  Left biceps: 5/5  Right triceps: 5/5  Left triceps: 5/5  Right interossei: 5/5  Left interossei: 5/5    REFLEXES     Reflexes   Right brachioradialis: 2+  Left brachioradialis: 2+  Right biceps: 2+  Left biceps: 2+  Right patellar: 1+  Left patellar: 1+  Right achilles: 0  Left achilles: 0    SENSORY EXAM   Right leg light touch: decreased from knee  Left leg light touch: decreased from knee  Right leg vibration: decreased from knee  Left leg vibration: decreased from knee       Sensory level around T9/T10     GAIT AND COORDINATION     Gait  Gait: (deferred)     Coordination   Finger to nose coordination: normal    Tremor   Resting tremor: absent    Significant Labs: All pertinent lab results from the past 24 hours have been reviewed.    Significant Imaging: I have reviewed all pertinent imaging results/findings within the past 24 hours.    MRI C/T/L spine W WO contrast (9/27/23):  Abnormal central spinal cord edema spanning T9 to the distal conus with areas of irregular enhancement.      Assessment and Plan:     Paraplegia  67 y.o. female with HTN, GERD, depression and tobacco abuse presented 9/25/23 with BLE plegia that began abruptly after lifting her rollator with groceries on 9/18. Pt reports using a rollator x 3 years after a L knee injury (ACL, meniscus). She walked to [employer] to get groceries and tried to lift her walker and felt a pop. She knew she injured something in her back. She was able to make it home and walk to the bathroom, felt weak getting off the toilet, but was able to make it to her couch. Once lying down on the sofa, she was unable to get back up and called 911. Says over a matter of 3 hours, she was unable to move her legs at all and had abnormal sensation from waist down including  lack of urge to urinate and have a bowel movement.     Presentation concerning for spinal injury/trauma c/w spinal shock    Recommendations:  --repeat MRI neuro-axis W WO contrast to assess for interval change  Pending results will consider steroids  --hold off on LP at this time  --PT/OT as able to tolerate  --pain control per primary team    VTE Risk Mitigation (From admission, onward)         Ordered     heparin (porcine) injection 5,000 Units  Every 8 hours         09/30/23 0942     IP VTE HIGH RISK PATIENT  Once         09/25/23 1119     Place sequential compression device  Until discontinued         09/25/23 1119     Reason for No Pharmacological VTE Prophylaxis  Once        Question:  Reasons:  Answer:  Risk of Bleeding    09/25/23 1119              Thank you for your consult. I will follow-up with patient. Please contact us if you have any additional questions.    Lakshmi White PA-C  General Neurology Consult

## 2023-10-04 NOTE — ASSESSMENT & PLAN NOTE
67 y.o. female with HTN, GERD, depression and tobacco abuse presented 9/25/23 with BLE plegia that began abruptly after lifting her rollator with groceries on 9/18. Pt reports using a rollator x 3 years after a L knee injury (ACL, meniscus). She walked to [employer] to get groceries and tried to lift her walker and felt a pop. She knew she injured something in her back. She was able to make it home and walk to the bathroom, felt weak getting off the toilet, but was able to make it to her couch. Once lying down on the sofa, she was unable to get back up and called 911. Says over a matter of 3 hours, she was unable to move her legs at all and had abnormal sensation from waist down including lack of urge to urinate and have a bowel movement.     Presentation concerning for spinal injury/trauma c/w spinal shock    Recommendations:  --repeat MRI neuro-axis W WO contrast to assess for interval change  Pending results will consider steroids  --hold off on LP at this time  --PT/OT as able to tolerate  --pain control per primary team

## 2023-10-04 NOTE — ASSESSMENT & PLAN NOTE
Diagnosed at OSH based on upper quadrant abdominal pain and CT abdomen pelvis suggesting colitis. She was started on cipro and flagyl. Has not had any bowel movements since 9/18 despite aggressive bowel regimen at OSH.   CT a/p 9/26 - Large stool ball within the rectum with a suggestion of some rectal wall thickening.  Mild presacral soft tissue thickening/edema.  Findings raise the question of possible proctitis.  Improved symptoms w bowel movement  Off abx by the time she stepped down to me, has rectal tube in -> removed 10/3 since stool output was minimal  Bowel regimen stopped due to diarrhea

## 2023-10-04 NOTE — PLAN OF CARE
10/04/23 1725   Post-Acute Status   Post-Acute Authorization Placement   Post-Acute Placement Status Referrals Sent   Discharge Delays None known at this time   Discharge Plan   Discharge Plan A Rehab     Pt was planning on going to [facility] when she was at [facility]. She would like me to submit referral to their facility. While I was at Pt's bedside she called Malgorzata at [facility] since she had her business card on hand to let her know we will be submitting referral. Malgorzata said they will review the referral and get back to us tomorrow. I submitted referral via [facility].    Coby Jansen, RN    [facility]  [phone]

## 2023-10-04 NOTE — ASSESSMENT & PLAN NOTE
-MRI cervical and thoracic spine revealed stenosis and T9-L1 central cord edema with mild enhancement.   -S/P Spinal angio 10/02. Per NSGY. No significant findings.  -Neurology was consulted. LP pending.  -PT/OT rec for IPR. Pt deciding on where to complete IPR, close to home or in [place].

## 2023-10-04 NOTE — SUBJECTIVE & OBJECTIVE
Interval History: rectal tube removed yesterday due to decreased stool output. Neurology recommended repeating MRI spine before recommending LP.     Review of Systems   Constitutional:  Negative for chills and fever.   Respiratory:  Negative for shortness of breath.    Cardiovascular:  Negative for chest pain.   Gastrointestinal:  Negative for abdominal pain, diarrhea, nausea and vomiting.   All other systems reviewed and are negative.    Objective:     Vital Signs (Most Recent):  Temp: 97.2 °F (36.2 °C) (10/04/23 1517)  Pulse: 87 (10/04/23 1517)  Resp: 18 (10/04/23 1517)  BP: (!) 129/55 (10/04/23 1517)  SpO2: (!) 94 % (10/04/23 1517) Vital Signs (24h Range):  Temp:  [97.2 °F (36.2 °C)-99.2 °F (37.3 °C)] 97.2 °F (36.2 °C)  Pulse:  [73-88] 87  Resp:  [16-23] 18  SpO2:  [91 %-97 %] 94 %  BP: (125-136)/(55-77) 129/55     Weight: 91.6 kg (201 lb 15.1 oz)  Body mass index is 34.66 kg/m².    Intake/Output Summary (Last 24 hours) at 10/4/2023 1653  Last data filed at 10/4/2023 0615  Gross per 24 hour   Intake 240 ml   Output 1750 ml   Net -1510 ml         Physical Exam  Vitals and nursing note reviewed.   Constitutional:       General: She is not in acute distress.     Appearance: She is not toxic-appearing.   HENT:      Head: Normocephalic and atraumatic.   Eyes:      General: No scleral icterus.     Extraocular Movements: Extraocular movements intact.   Cardiovascular:      Rate and Rhythm: Normal rate and regular rhythm.      Pulses: Normal pulses.   Pulmonary:      Effort: Pulmonary effort is normal. No respiratory distress.   Abdominal:      General: Bowel sounds are normal.      Palpations: Abdomen is soft.      Tenderness: There is no abdominal tenderness. There is no guarding or rebound.   Skin:     General: Skin is warm.   Neurological:      Mental Status: She is alert and oriented to person, place, and time.      Cranial Nerves: No cranial nerve deficit.      Motor: Weakness present.             Significant Labs:  All pertinent labs within the past 24 hours have been reviewed.  CBC:   Recent Labs   Lab 10/03/23  0500 10/04/23  0641   WBC 4.67 4.19   HGB 11.6* 11.8*   HCT 34.2* 35.1*    241     CMP:   Recent Labs   Lab 10/03/23  0500 10/04/23  0641   * 130*   K 4.4 4.4   CL 99 98   CO2 24 27   * 153*   BUN 9 10   CREATININE 0.6 0.5   CALCIUM 8.6* 8.8   PROT 5.8* 6.1   ALBUMIN 2.1* 2.2*   BILITOT 0.5 0.6   ALKPHOS 152* 145*   AST 34 35   ALT 52* 52*   ANIONGAP 8 5*       Significant Imaging: I have reviewed all pertinent imaging results/findings within the past 24 hours.

## 2023-10-04 NOTE — ANESTHESIA RELEASE NOTE
"Anesthesia Release from PACU Note    Patient: Rhina Forrest    Procedure(s) Performed: * No procedures listed *    Anesthesia type: general    Post pain: Adequate analgesia    Post assessment: no apparent anesthetic complications and tolerated procedure well    Last Vitals:   Visit Vitals  BP (!) 136/58 (BP Location: Left forearm, Patient Position: Sitting)   Pulse 79   Temp 37.1 °C (98.8 °F) (Axillary)   Resp 16   Ht 5' 4" (1.626 m)   Wt 91.6 kg (201 lb 15.1 oz)   SpO2 (!) 92%   BMI 34.66 kg/m²       Post vital signs: stable    Level of consciousness: awake and alert     Nausea/Vomiting: no nausea/no vomiting    Complications: none    Airway Patency: patent    Respiratory: unassisted, spontaneous ventilation, room air    Cardiovascular: stable and blood pressure at baseline    Hydration: euvolemic  "

## 2023-10-04 NOTE — PLAN OF CARE
Problem: Adult Inpatient Plan of Care  Goal: Plan of Care Review  10/4/2023 0335 by Corwin Gómez LPN  Outcome: Ongoing, Progressing  10/4/2023 0331 by Corwin Gómez LPN  Outcome: Ongoing, Progressing  Goal: Patient-Specific Goal (Individualized)  10/4/2023 0335 by Corwin Gómez LPN  Outcome: Ongoing, Progressing  10/4/2023 0331 by Corwin Gómez LPN  Outcome: Ongoing, Progressing  Goal: Absence of Hospital-Acquired Illness or Injury  10/4/2023 0335 by Corwin Gómez LPN  Outcome: Ongoing, Progressing  10/4/2023 0331 by Corwin Gómez LPN  Outcome: Ongoing, Progressing  Goal: Optimal Comfort and Wellbeing  10/4/2023 0335 by Corwin Gómez LPN  Outcome: Ongoing, Progressing  10/4/2023 0331 by Corwin Gómez LPN  Outcome: Ongoing, Progressing     Problem: Pain (Stroke, Hemorrhagic)  Goal: Acceptable Pain Control  10/4/2023 0335 by Corwin Gómez LPN  Outcome: Ongoing, Progressing  10/4/2023 0331 by Corwin Gómez LPN  Outcome: Ongoing, Progressing     Problem: Urinary Elimination Impaired (Stroke, Hemorrhagic)  Goal: Effective Urinary Elimination  10/4/2023 0335 by Corwin Gómez LPN  Outcome: Ongoing, Progressing  10/4/2023 0331 by Corwin Gómez LPN  Outcome: Ongoing, Progressing     Problem: Skin Injury Risk Increased  Goal: Skin Health and Integrity  Outcome: Ongoing, Progressing         POC updated and reviewed with the patient at bedside. Questions regarding POC were encouraged and addressed. VSS, see flowsheets. Patient is AO x 4 at this time. Tele maintained per order. VISI on for additional monitoring.  See MAR for medication administration details. Fall/safety precautions maintained.  No signs of injury noted over night. Patient was repositioned for comfort with bed locked in low position, side rails up x 3, bed alarm on, and call light within reach. Instructed patient to call staff for mobility, verbalized understanding. No acute signs of distress noted  at this time.

## 2023-10-04 NOTE — CONSULTS
[facility] - Neurosurgery ([facility])  Physical Medicine & Rehab  Consult Note    Patient Name: Rhina Forrest  MRN: [id number]  Admission Date: 9/25/2023  Hospital Length of Stay: 9 days  Attending Physician: Katya Smith MD  Consults  Subjective:     Principal Problem: Acute transverse myelitis    HPI: Per chart review, Pt floyd 67 year old female with PMHX of HTN GERD, Depression. Pt  presented to OSH  due to plegia after heavy lifting. Pt had MRI of thoracic and C-spine that was done that revealed stenosis and T9-L1 cord edema with mild enhancement supiscious for evolving cord infarct Vs demyelinating process. Pt was found to be in NSTEMI. Pt was started with ASA and Plavix. . CT of abd and pelvis was done due to pt having abdominal pain. Pt was found to have colitis. Pt completed treatment with Cipro and Flagyl.  Pt was transferred to [facility] main campus for neurosurgical evaluation. Pt is now S/P spinal Angio on 10/02/2023. NSGY since signed off and recommended LP and Neurology consult. Pt is pending LP at this time. PM &R was consulted to evaluate pt for IPR placement.         Functional History: Patient lives alone  in a first floor with 0 steps  to enter.  Prior to admission, Pt was I with ADLs and mobility.  DME: [employer].        Hospital Course: Per chart review,    OT- 10/03    Bed Mobility:     Patient completed Rolling/Turning to Left with  maximal assistance   Patient completed Scooting/Bridging with maximal assistance   Patient completed Supine to Sit with maximal assistance   Patient completed Sit to Supine with maximal assistance     Functional Mobility/Transfers:   Not completed at this time due to patient weakness in LE      Activities of Daily Living:   Grooming: minimum assistance while seated EOB   Upper Body Dressing: independence and minimum assistance while seated EOB   Lower Body Dressing: maximal assistance       PT-10/03    Functional Mobility:   Bed Mobility:  ? Scooting: maximal  assistance of 2 persons  ? Supine to Sit: maximal assistance of 2 persons  ? Sit to Supine: maximal assistance of 2 persons      Past Medical History:   Diagnosis Date    Abnormal heart rhythm     Acid reflux     Bile duct perforation     Diverticular disease of large intestine without perforation or abscess     Essential (primary) hypertension     Fatigue     Gastritis     GERD (gastroesophageal reflux disease)     Hepatic dysfunction      Past Surgical History:   Procedure Laterality Date    CHOLECYSTECTOMY       Review of patient's allergies indicates:   Allergen Reactions    Acetaminophen Shortness Of Breath    Azithromycin Shortness Of Breath    Amlodipine      hypertension    Androgenic anabolic steroid      hypertension    Bisoprolol Other (See Comments)     hypertension    Buspirone Other (See Comments)     hypertension    Cortisone Dermatitis     Burning skin feeling     Erythromycin base Hives    Ibuprofen Other (See Comments)     Hypertension      Immune globulin,gamma caprylate(igg) Hives     All Gamma globulins    Latex, natural rubber Itching    Levaquin [levofloxacin] Other (See Comments)     Fever      Losartan Other (See Comments)     hypertension    Penicillins Hives    Wellbutrin [bupropion hcl] Other (See Comments)     depression    Adhesive Rash    Hydrochlorothiazide Rash    Macrobid [nitrofurantoin monohyd/m-cryst] Rash       Scheduled Medications:    atorvastatin  20 mg Oral Daily    famotidine  20 mg Oral BID    gabapentin  800 mg Oral TID    heparin (porcine)  5,000 Units Subcutaneous Q8H    polyethylene glycol  17 g Oral Daily       PRN Medications: diazePAM, ondansetron, oxyCODONE, oxyCODONE, simethicone, sodium chloride 0.9%, sodium chloride 0.9%    Family History    None       Tobacco Use    Smoking status: Not on file    Smokeless tobacco: Not on file   Substance and Sexual Activity    Alcohol use: Not on file    Drug use: Not on file    Sexual  activity: Not on file     Review of Systems   Constitutional:  Positive for activity change.   Respiratory:  Negative for cough and shortness of breath.    Cardiovascular:  Negative for chest pain and leg swelling.   Musculoskeletal:  Positive for gait problem.   Neurological:  Positive for weakness.     Objective:     Vital Signs (Most Recent):  Temp: 98.8 °F (37.1 °C) (10/04/23 0819)  Pulse: 79 (10/04/23 0819)  Resp: 18 (10/04/23 0819)  BP: (!) 131/58 (10/04/23 0819)  SpO2: 95 % (10/04/23 0819)    Vital Signs (24h Range):  Temp:  [97.5 °F (36.4 °C)-99.2 °F (37.3 °C)] 98.8 °F (37.1 °C)  Pulse:  [76-88] 79  Resp:  [18-23] 18  SpO2:  [91 %-97 %] 95 %  BP: (125-139)/(55-81) 131/58     Body mass index is 34.66 kg/m².     Physical Exam  Vitals and nursing note reviewed.   Constitutional:       General: She is awake.   HENT:      Head: Normocephalic and atraumatic.   Pulmonary:      Effort: Pulmonary effort is normal. No respiratory distress.   Abdominal:      Palpations: Abdomen is soft.   Musculoskeletal:      Cervical back: Normal range of motion and neck supple.      Comments: BLE plegia.     Full strength to BUE   Skin:     General: Skin is warm and dry.      Capillary Refill: Capillary refill takes 2 to 3 seconds.   Neurological:      General: No focal deficit present.      Mental Status: She is alert and oriented to person, place, and time.      GCS: GCS eye subscore is 4. GCS verbal subscore is 5. GCS motor subscore is 6.      Motor: Weakness present.      Coordination: Heel to Shin Test abnormal. Impaired rapid alternating movements.      Gait: Gait abnormal.   Psychiatric:         Attention and Perception: Attention normal.         Mood and Affect: Mood normal.         Behavior: Behavior normal. Behavior is cooperative.          NEUROLOGICAL EXAMINATION:     MENTAL STATUS   Oriented to person, place, and time.       Diagnostic Results: Labs: Reviewed    Assessment/Plan:     * Acute transverse myelitis  -MRI  cervical and thoracic spine revealed stenosis and T9-L1 central cord edema with mild enhancement.   -S/P Spinal angio 10/02. Per NSGY. No significant findings.  -Neurology was consulted. LP pending.  -PT/OT rec for IPR. Pt deciding on where to complete IPR, close to home or in [place].     Urinary retention  -Hernandez cath in place.    Debility  See hospital course for functional status.    Recommendations  -  Encourage mobility, OOB in chair, and early ambulation as appropriate  -  PT/OT evaluate and treat  -  Pain management  -  DVT prophylaxis if appropriate   -  Monitor for and prevent skin breakdown and pressure ulcers  · Early mobility, repositioning/weight shifting every 20-30 minutes when sitting, turn patient every 2 hours, proper mattress/overlay and chair cushioning, pressure relief/heel protector boots      Colitis  -Completed antibiotics.    PM&R Recommendation:     At this time, the PM&R team has reviewed this patient's ongoing medical case including inpatient diagnosis, medical history, clinical examination, labs, vitals, current social and functional history. We will continue to follow pt for a potential rehab candidate pending LP completion and medical clearance.         Thank you for your consult.     Nayeli Damico NP  Department of Physical Medicine & Rehab  [facility] - Neurosurgery ([facility])

## 2023-10-04 NOTE — SUBJECTIVE & OBJECTIVE
Past Medical History:   Diagnosis Date    Abnormal heart rhythm     Acid reflux     Bile duct perforation     Diverticular disease of large intestine without perforation or abscess     Essential (primary) hypertension     Fatigue     Gastritis     GERD (gastroesophageal reflux disease)     Hepatic dysfunction      Past Surgical History:   Procedure Laterality Date    CHOLECYSTECTOMY       Review of patient's allergies indicates:   Allergen Reactions    Acetaminophen Shortness Of Breath    Azithromycin Shortness Of Breath    Amlodipine      hypertension    Androgenic anabolic steroid      hypertension    Bisoprolol Other (See Comments)     hypertension    Buspirone Other (See Comments)     hypertension    Cortisone Dermatitis     Burning skin feeling     Erythromycin base Hives    Ibuprofen Other (See Comments)     Hypertension      Immune globulin,gamma caprylate(igg) Hives     All Gamma globulins    Latex, natural rubber Itching    Levaquin [levofloxacin] Other (See Comments)     Fever      Losartan Other (See Comments)     hypertension    Penicillins Hives    Wellbutrin [bupropion hcl] Other (See Comments)     depression    Adhesive Rash    Hydrochlorothiazide Rash    Macrobid [nitrofurantoin monohyd/m-cryst] Rash     No current facility-administered medications on file prior to encounter.     No current outpatient medications on file prior to encounter.     Family History    None       Tobacco Use    Smoking status: Not on file    Smokeless tobacco: Not on file   Substance and Sexual Activity    Alcohol use: Not on file    Drug use: Not on file    Sexual activity: Not on file     Review of Systems   Constitutional:  Positive for activity change and fatigue. Negative for fever.   HENT:  Negative for trouble swallowing and voice change.    Eyes:  Negative for visual disturbance.   Respiratory:  Negative for shortness of breath.    Cardiovascular:  Negative for chest pain.   Gastrointestinal:  Negative for nausea  and vomiting.   Genitourinary:  Positive for difficulty urinating.   Musculoskeletal:  Positive for back pain and gait problem. Negative for neck stiffness.   Neurological:  Positive for weakness and numbness. Negative for dizziness, tremors, seizures, syncope, facial asymmetry, speech difficulty, light-headedness and headaches.   Psychiatric/Behavioral:  Positive for dysphoric mood.      Objective:     Vital Signs (Most Recent):  Temp: 97.2 °F (36.2 °C) (10/04/23 1517)  Pulse: 87 (10/04/23 1517)  Resp: 18 (10/04/23 1517)  BP: (!) 129/55 (10/04/23 1517)  SpO2: (!) 94 % (10/04/23 1517) Vital Signs (24h Range):  Temp:  [97.2 °F (36.2 °C)-99.2 °F (37.3 °C)] 97.2 °F (36.2 °C)  Pulse:  [73-88] 87  Resp:  [16-23] 18  SpO2:  [91 %-97 %] 94 %  BP: (125-136)/(55-77) 129/55     Weight: 91.6 kg (201 lb 15.1 oz)  Body mass index is 34.66 kg/m².     Physical Exam  Eyes:      Extraocular Movements: EOM normal.      Pupils: Pupils are equal, round, and reactive to light.   Neurological:      Mental Status: She is oriented to person, place, and time.      Coordination: Finger-Nose-Finger Test normal.      Deep Tendon Reflexes:      Reflex Scores:       Bicep reflexes are 2+ on the right side and 2+ on the left side.       Brachioradialis reflexes are 2+ on the right side and 2+ on the left side.       Patellar reflexes are 1+ on the right side and 1+ on the left side.       Achilles reflexes are 0 on the right side and 0 on the left side.  Psychiatric:         Speech: Speech normal.          NEUROLOGICAL EXAMINATION:     MENTAL STATUS   Oriented to person, place, and time.   Follows 2 step commands.   Attention: normal. Concentration: normal.   Speech: speech is normal   Level of consciousness: alert  Knowledge: good.   Normal comprehension.     CRANIAL NERVES     CN III, IV, VI   Pupils are equal, round, and reactive to light.  Extraocular motions are normal.   Nystagmus: none   Diplopia: none  Ophthalmoparesis: none    CN V    Facial sensation intact.     CN VII   Facial expression full, symmetric.     CN VIII   Hearing: intact    CN IX, X   Palate: symmetric    CN XI   CN XI normal.     CN XII   CN XII normal.     MOTOR EXAM   Muscle bulk: normal  Overall muscle tone: normal    Strength   Right deltoid: 5/5  Left deltoid: 5/5  Right biceps: 5/5  Left biceps: 5/5  Right triceps: 5/5  Left triceps: 5/5  Right interossei: 5/5  Left interossei: 5/5    REFLEXES     Reflexes   Right brachioradialis: 2+  Left brachioradialis: 2+  Right biceps: 2+  Left biceps: 2+  Right patellar: 1+  Left patellar: 1+  Right achilles: 0  Left achilles: 0    SENSORY EXAM   Right leg light touch: decreased from knee  Left leg light touch: decreased from knee  Right leg vibration: decreased from knee  Left leg vibration: decreased from knee       Sensory level around T9/T10     GAIT AND COORDINATION     Gait  Gait: (deferred)     Coordination   Finger to nose coordination: normal    Tremor   Resting tremor: absent    Significant Labs: All pertinent lab results from the past 24 hours have been reviewed.    Significant Imaging: I have reviewed all pertinent imaging results/findings within the past 24 hours.    MRI C/T/L spine W WO contrast (9/27/23):  Abnormal central spinal cord edema spanning T9 to the distal conus with areas of irregular enhancement.

## 2023-10-04 NOTE — NURSING
Transfer of care @1145. Report given to AMANDO Ramirez. Addressed all questions/concerns. Pt moved to room 926 from room 909. AVERY Avitia

## 2023-10-04 NOTE — PROGRESS NOTES
[facility] - Neurosurgery ([facility])  [facility] Medicine  Progress Note    Patient Name: Rhina Forrest  MRN: [id number]  Patient Class: IP- Inpatient   Admission Date: 9/25/2023  Length of Stay: 9 days  Attending Physician: Katya Smith MD  Primary Care Provider: Cal Alvarado FNP-C        Subjective:     Principal Problem:Acute paraplegia        HPI:  Copied from Neuro-crit care team:  66 yo F PMHx GERD, HTN, smoker, depression presenting with bilateral lower extremity plegia that began after heavy lifting on 9/18/23. Pt walks with a walker at baseline and lives at home alone. On 9/18 she was walking home with groceries on her walker and lifted the walker over a curb and hurt her back. Pt was able to walk home and put her groceries away. Once she sat on the couch, she was unable to get back up as her legs stopped moving. She also had decreased sensation from T12 down. Pt called 911 and was taken via EMS to [facility]. MRI cervical and thoracic spine revealed stenosis and T9-L1 cord edema with mild enhancement suspicious for evolving cord infarct vs demyelinating process per radiology report (no imaging was sent with patient). Pt was found to have NSTEMI with troponin 1.1 and was started on ASA and plavix. Pt also c/o bl upper quadrant abdominal pain and CT abdomen pelvis revealed colitis. She was started on cipro and flagyl. Pt has had a barber since 9/18 and has not had any bowel movements since 9/18 despite aggressive bowel regimen at OSH. [facility] evaluation and further workup. Pt currently c/o severe abdominal pain, n/v, severe back pain, BUE rash x 1 month, BLE plegia and T12 sensory level.          Overview/Hospital Course:  ICU course - remarkable for bowel decompression with laxatives - rectal tube inserted, improved abdominal pain, discontinuation of all antibiotics, pain control issues (better on current rx). Stepped down to  10/1/23. On 10/2/23, IR  performed spinal angiogram - came back normal. NSGy recommended Neurology consult for LP and signed off. Hernandez catheter inserted the same day for urinary retention. Neurology recommended repeating MRI spine to look for interval change. Will ultimately need inpatient rehab based on therapy assessments once she completes inpatient work up. PMR is consulted and following daily.            Interval History: rectal tube removed yesterday due to decreased stool output. Neurology recommended repeating MRI spine before recommending LP.     Review of Systems   Constitutional:  Negative for chills and fever.   Respiratory:  Negative for shortness of breath.    Cardiovascular:  Negative for chest pain.   Gastrointestinal:  Negative for abdominal pain, diarrhea, nausea and vomiting.   All other systems reviewed and are negative.    Objective:     Vital Signs (Most Recent):  Temp: 97.2 °F (36.2 °C) (10/04/23 1517)  Pulse: 87 (10/04/23 1517)  Resp: 18 (10/04/23 1517)  BP: (!) 129/55 (10/04/23 1517)  SpO2: (!) 94 % (10/04/23 1517) Vital Signs (24h Range):  Temp:  [97.2 °F (36.2 °C)-99.2 °F (37.3 °C)] 97.2 °F (36.2 °C)  Pulse:  [73-88] 87  Resp:  [16-23] 18  SpO2:  [91 %-97 %] 94 %  BP: (125-136)/(55-77) 129/55     Weight: 91.6 kg (201 lb 15.1 oz)  Body mass index is 34.66 kg/m².    Intake/Output Summary (Last 24 hours) at 10/4/2023 1653  Last data filed at 10/4/2023 0615  Gross per 24 hour   Intake 240 ml   Output 1750 ml   Net -1510 ml         Physical Exam  Vitals and nursing note reviewed.   Constitutional:       General: She is not in acute distress.     Appearance: She is not toxic-appearing.   HENT:      Head: Normocephalic and atraumatic.   Eyes:      General: No scleral icterus.     Extraocular Movements: Extraocular movements intact.   Cardiovascular:      Rate and Rhythm: Normal rate and regular rhythm.      Pulses: Normal pulses.   Pulmonary:      Effort: Pulmonary effort is normal. No respiratory distress.   Abdominal:       General: Bowel sounds are normal.      Palpations: Abdomen is soft.      Tenderness: There is no abdominal tenderness. There is no guarding or rebound.   Skin:     General: Skin is warm.   Neurological:      Mental Status: She is alert and oriented to person, place, and time.      Cranial Nerves: No cranial nerve deficit.      Motor: Weakness present.             Significant Labs: All pertinent labs within the past 24 hours have been reviewed.  CBC:   Recent Labs   Lab 10/03/23  0500 10/04/23  0641   WBC 4.67 4.19   HGB 11.6* 11.8*   HCT 34.2* 35.1*    241     CMP:   Recent Labs   Lab 10/03/23  0500 10/04/23  0641   * 130*   K 4.4 4.4   CL 99 98   CO2 24 27   * 153*   BUN 9 10   CREATININE 0.6 0.5   CALCIUM 8.6* 8.8   PROT 5.8* 6.1   ALBUMIN 2.1* 2.2*   BILITOT 0.5 0.6   ALKPHOS 152* 145*   AST 34 35   ALT 52* 52*   ANIONGAP 8 5*       Significant Imaging: I have reviewed all pertinent imaging results/findings within the past 24 hours.      Assessment/Plan:      * Acute paraplegia  66 y/o female presented as a direct transfer from OSH to Neuro-ICU for hourly neuro-checks 9/25 with sudden onset bilateral lower extremity paresis, urinary retention and paresthesia that began after heavy lifting on 9/18/23.   OSH MRI cervical and thoracic spine revealed stenosis and T9-L1 central cord edema with mild enhancement. MRI Brain w/wo 9/25: patchy periventricular white matter T2/flair hyperintensities demylination vs microvascular disease  MRI C/T/L spine 9/25: Abnormal cord edema from T9 to conus  MRI spine demyelinating/MRA: possible diffusion restriction in distal thoracic spine/conus, non diagnostic MRA    --Cord findings suspicious for mass vs ischemia vs demyelinating disorder  -serum NMO negative  -Spinal angiogram to evaluate for possible cord ischemia 10/2 - normal   -Neurosurgery signed off, recommends LP with Neurology consult. Neurology decided to post-pone LP and to repeat spinal MRI -  ordered today, f/u results.     PT/OT - inpatient rehab      Urinary retention  Related to suspected spinal cord injury.   CIC while in ICU, had high bladder residual 10/2 >800 on nursing assessment therefore barber catheter insertion ordered  -maintain barber catheter until more mobile and neurologic work up is completed        Debility  PT/OT evaluation - inpatient rehab      Colitis  Diagnosed at OSH based on upper quadrant abdominal pain and CT abdomen pelvis suggesting colitis. She was started on cipro and flagyl. Has not had any bowel movements since  despite aggressive bowel regimen at OSH.   CT a/p  - Large stool ball within the rectum with a suggestion of some rectal wall thickening.  Mild presacral soft tissue thickening/edema.  Findings raise the question of possible proctitis.  Improved symptoms w bowel movement  Off abx by the time she stepped down to me, has rectal tube in -> removed 10/3 since stool output was minimal  Bowel regimen stopped due to diarrhea      GERD (gastroesophageal reflux disease)  On famotidine 20 bid    Depression  Per Neuro-crit care team: Hx of depression. 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 Pt's son committed suicide and her mother  after her son passed. Pt now lives alone and has minimal interaction with her 2 living children. Pt found to have several psychosomatic complaints at OSH related to medication. She stated she takes her home medications 1-2x per month as she is scared of the side effects.     -no active symptoms of depression - will not consult Psychiatry at this time         VTE Risk Mitigation (From admission, onward)         Ordered     heparin (porcine) injection 5,000 Units  Every 8 hours         23 0942     IP VTE HIGH RISK PATIENT  Once         23 1119     Place sequential compression device  Until discontinued         23 1119     Reason for No Pharmacological VTE Prophylaxis  Once        Question:   Reasons:  Answer:  Risk of Bleeding    09/25/23 1119                Discharge Planning   DEMETRICE: 10/5/2023     Code Status: Full Code   Is the patient medically ready for discharge?: No    Reason for patient still in hospital (select all that apply): Imaging and Consult recommendations  Discharge Plan A: Rehab   Discharge Delays: None known at this time              Katya Smith MD  Department of Hospital Medicine   [facility] - Neurosurgery ([facility])

## 2023-10-04 NOTE — ASSESSMENT & PLAN NOTE
Per Neuro-crit care team: Hx of depression. 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 Pt's son committed suicide and her mother  after her son passed. Pt now lives alone and has minimal interaction with her 2 living children. Pt found to have several psychosomatic complaints at OSH related to medication. She stated she takes her home medications 1-2x per month as she is scared of the side effects.     -no active symptoms of depression - will not consult Psychiatry at this time

## 2023-10-05 PROBLEM — K21.9 GERD (GASTROESOPHAGEAL REFLUX DISEASE): Status: RESOLVED | Noted: 2023-09-25 | Resolved: 2023-10-05

## 2023-10-05 PROBLEM — F32.A DEPRESSION: Status: RESOLVED | Noted: 2023-09-25 | Resolved: 2023-10-05

## 2023-10-05 PROBLEM — R53.81 DEBILITY: Status: RESOLVED | Noted: 2023-09-27 | Resolved: 2023-10-05

## 2023-10-05 LAB
ALBUMIN SERPL BCP-MCNC: 2.1 G/DL (ref 3.5–5.2)
ALP SERPL-CCNC: 151 U/L (ref 55–135)
ALT SERPL W/O P-5'-P-CCNC: 51 U/L (ref 10–44)
ANION GAP SERPL CALC-SCNC: 7 MMOL/L (ref 8–16)
AST SERPL-CCNC: 34 U/L (ref 10–40)
BASOPHILS # BLD AUTO: 0.03 K/UL (ref 0–0.2)
BASOPHILS NFR BLD: 0.9 % (ref 0–1.9)
BILIRUB SERPL-MCNC: 0.5 MG/DL (ref 0.1–1)
BUN SERPL-MCNC: 10 MG/DL (ref 8–23)
CALCIUM SERPL-MCNC: 8.5 MG/DL (ref 8.7–10.5)
CHLORIDE SERPL-SCNC: 99 MMOL/L (ref 95–110)
CLARITY CSF: CLEAR
CLARITY CSF: CLEAR
CO2 SERPL-SCNC: 26 MMOL/L (ref 23–29)
COLOR CSF: COLORLESS
COLOR CSF: COLORLESS
CREAT SERPL-MCNC: 0.6 MG/DL (ref 0.5–1.4)
CSF TUBE NUMBER: 2
CSF TUBE NUMBER: 2
DIFFERENTIAL METHOD: ABNORMAL
EOSINOPHIL # BLD AUTO: 0.1 K/UL (ref 0–0.5)
EOSINOPHIL NFR BLD: 2.6 % (ref 0–8)
ERYTHROCYTE [DISTWIDTH] IN BLOOD BY AUTOMATED COUNT: 12.2 % (ref 11.5–14.5)
EST. GFR  (NO RACE VARIABLE): >60 ML/MIN/1.73 M^2
GLUCOSE CSF-MCNC: 94 MG/DL (ref 40–70)
GLUCOSE SERPL-MCNC: 159 MG/DL (ref 70–110)
HCT VFR BLD AUTO: 32 % (ref 37–48.5)
HGB BLD-MCNC: 10.8 G/DL (ref 12–16)
IMM GRANULOCYTES # BLD AUTO: 0.02 K/UL (ref 0–0.04)
IMM GRANULOCYTES NFR BLD AUTO: 0.6 % (ref 0–0.5)
LYMPHOCYTES # BLD AUTO: 0.8 K/UL (ref 1–4.8)
LYMPHOCYTES NFR BLD: 24.2 % (ref 18–48)
LYMPHOCYTES NFR CSF MANUAL: 100 % (ref 40–80)
LYMPHOCYTES NFR CSF MANUAL: 100 % (ref 40–80)
MCH RBC QN AUTO: 31.5 PG (ref 27–31)
MCHC RBC AUTO-ENTMCNC: 33.8 G/DL (ref 32–36)
MCV RBC AUTO: 93 FL (ref 82–98)
MONOCYTES # BLD AUTO: 0.4 K/UL (ref 0.3–1)
MONOCYTES NFR BLD: 11.7 % (ref 4–15)
NEUTROPHILS # BLD AUTO: 2.1 K/UL (ref 1.8–7.7)
NEUTROPHILS NFR BLD: 60 % (ref 38–73)
NRBC BLD-RTO: 0 /100 WBC
PHOSPHATE SERPL-MCNC: 3.2 MG/DL (ref 2.7–4.5)
PLATELET # BLD AUTO: 249 K/UL (ref 150–450)
PMV BLD AUTO: 9.3 FL (ref 9.2–12.9)
POTASSIUM SERPL-SCNC: 4.1 MMOL/L (ref 3.5–5.1)
PROT CSF-MCNC: 41 MG/DL (ref 15–40)
PROT SERPL-MCNC: 5.7 G/DL (ref 6–8.4)
RBC # BLD AUTO: 3.43 M/UL (ref 4–5.4)
RBC # CSF: 0 /CU MM
RBC # CSF: 0 /CU MM
SODIUM SERPL-SCNC: 132 MMOL/L (ref 136–145)
SPECIMEN VOL CSF: 6 ML
SPECIMEN VOL CSF: 6 ML
WBC # BLD AUTO: 3.43 K/UL (ref 3.9–12.7)
WBC # CSF: 1 /CU MM (ref 0–5)
WBC # CSF: 1 /CU MM (ref 0–5)

## 2023-10-05 PROCEDURE — 87529 HSV DNA AMP PROBE: CPT

## 2023-10-05 PROCEDURE — 87798 DETECT AGENT NOS DNA AMP: CPT

## 2023-10-05 PROCEDURE — 25000003 PHARM REV CODE 250: Performed by: NURSE PRACTITIONER

## 2023-10-05 PROCEDURE — 99233 SBSQ HOSP IP/OBS HIGH 50: CPT | Mod: GC,,, | Performed by: STUDENT IN AN ORGANIZED HEALTH CARE EDUCATION/TRAINING PROGRAM

## 2023-10-05 PROCEDURE — 99499 LUMBAR PUNCTURE: ICD-10-PCS | Mod: GC,,, | Performed by: STUDENT IN AN ORGANIZED HEALTH CARE EDUCATION/TRAINING PROGRAM

## 2023-10-05 PROCEDURE — 11000001 HC ACUTE MED/SURG PRIVATE ROOM

## 2023-10-05 PROCEDURE — 82945 GLUCOSE OTHER FLUID: CPT

## 2023-10-05 PROCEDURE — 86255 FLUORESCENT ANTIBODY SCREEN: CPT | Mod: 59

## 2023-10-05 PROCEDURE — 87798 DETECT AGENT NOS DNA AMP: CPT | Mod: 59

## 2023-10-05 PROCEDURE — 63600175 PHARM REV CODE 636 W HCPCS: Performed by: NURSE PRACTITIONER

## 2023-10-05 PROCEDURE — 85025 COMPLETE CBC W/AUTO DIFF WBC: CPT | Performed by: NURSE PRACTITIONER

## 2023-10-05 PROCEDURE — 87206 SMEAR FLUORESCENT/ACID STAI: CPT

## 2023-10-05 PROCEDURE — 83521 IG LIGHT CHAINS FREE EACH: CPT

## 2023-10-05 PROCEDURE — 97530 THERAPEUTIC ACTIVITIES: CPT | Mod: CO

## 2023-10-05 PROCEDURE — 82164 ANGIOTENSIN I ENZYME TEST: CPT | Mod: 91

## 2023-10-05 PROCEDURE — 36415 COLL VENOUS BLD VENIPUNCTURE: CPT

## 2023-10-05 PROCEDURE — 99233 SBSQ HOSP IP/OBS HIGH 50: CPT | Mod: ,,, | Performed by: HOSPITALIST

## 2023-10-05 PROCEDURE — 80053 COMPREHEN METABOLIC PANEL: CPT | Performed by: NURSE PRACTITIONER

## 2023-10-05 PROCEDURE — 63600175 PHARM REV CODE 636 W HCPCS: Performed by: HOSPITALIST

## 2023-10-05 PROCEDURE — 87102 FUNGUS ISOLATION CULTURE: CPT

## 2023-10-05 PROCEDURE — 87070 CULTURE OTHR SPECIMN AEROBIC: CPT

## 2023-10-05 PROCEDURE — 99499 UNLISTED E&M SERVICE: CPT | Mod: GC,,, | Performed by: STUDENT IN AN ORGANIZED HEALTH CARE EDUCATION/TRAINING PROGRAM

## 2023-10-05 PROCEDURE — 99499 NO LOS: ICD-10-PCS | Mod: ,,, | Performed by: PHYSICIAN ASSISTANT

## 2023-10-05 PROCEDURE — 97530 THERAPEUTIC ACTIVITIES: CPT

## 2023-10-05 PROCEDURE — 86053 AQAPRN-4 ANTB FLO CYTMTRY EA: CPT | Mod: 59 | Performed by: PHYSICIAN ASSISTANT

## 2023-10-05 PROCEDURE — 25500020 PHARM REV CODE 255: Performed by: HOSPITALIST

## 2023-10-05 PROCEDURE — 86363 MOG-IGG1 ANTB FLO CYTMTRY EA: CPT | Performed by: PHYSICIAN ASSISTANT

## 2023-10-05 PROCEDURE — 84157 ASSAY OF PROTEIN OTHER: CPT

## 2023-10-05 PROCEDURE — 36415 COLL VENOUS BLD VENIPUNCTURE: CPT | Performed by: PHYSICIAN ASSISTANT

## 2023-10-05 PROCEDURE — 86255 FLUORESCENT ANTIBODY SCREEN: CPT | Mod: 59 | Performed by: PHYSICIAN ASSISTANT

## 2023-10-05 PROCEDURE — 87205 SMEAR GRAM STAIN: CPT

## 2023-10-05 PROCEDURE — 99499 UNLISTED E&M SERVICE: CPT | Mod: ,,, | Performed by: PHYSICIAN ASSISTANT

## 2023-10-05 PROCEDURE — 36415 COLL VENOUS BLD VENIPUNCTURE: CPT | Performed by: NURSE PRACTITIONER

## 2023-10-05 PROCEDURE — 89051 BODY FLUID CELL COUNT: CPT

## 2023-10-05 PROCEDURE — 84100 ASSAY OF PHOSPHORUS: CPT | Performed by: NURSE PRACTITIONER

## 2023-10-05 PROCEDURE — A9585 GADOBUTROL INJECTION: HCPCS | Performed by: HOSPITALIST

## 2023-10-05 PROCEDURE — 63600175 PHARM REV CODE 636 W HCPCS: Performed by: PHYSICIAN ASSISTANT

## 2023-10-05 PROCEDURE — 83916 OLIGOCLONAL BANDS: CPT

## 2023-10-05 PROCEDURE — 25000003 PHARM REV CODE 250

## 2023-10-05 PROCEDURE — 87498 ENTEROVIRUS PROBE&REVRS TRNS: CPT

## 2023-10-05 PROCEDURE — 97542 WHEELCHAIR MNGMENT TRAINING: CPT

## 2023-10-05 PROCEDURE — 99000 SPECIMEN HANDLING OFFICE-LAB: CPT

## 2023-10-05 PROCEDURE — 86592 SYPHILIS TEST NON-TREP QUAL: CPT

## 2023-10-05 PROCEDURE — 99233 PR SUBSEQUENT HOSPITAL CARE,LEVL III: ICD-10-PCS | Mod: GC,,, | Performed by: STUDENT IN AN ORGANIZED HEALTH CARE EDUCATION/TRAINING PROGRAM

## 2023-10-05 PROCEDURE — 99233 PR SUBSEQUENT HOSPITAL CARE,LEVL III: ICD-10-PCS | Mod: ,,, | Performed by: HOSPITALIST

## 2023-10-05 PROCEDURE — 87116 MYCOBACTERIA CULTURE: CPT

## 2023-10-05 PROCEDURE — 86053 AQAPRN-4 ANTB FLO CYTMTRY EA: CPT

## 2023-10-05 PROCEDURE — 83916 OLIGOCLONAL BANDS: CPT | Mod: 59

## 2023-10-05 PROCEDURE — 82164 ANGIOTENSIN I ENZYME TEST: CPT

## 2023-10-05 RX ORDER — ENOXAPARIN SODIUM 100 MG/ML
40 INJECTION SUBCUTANEOUS EVERY 24 HOURS
Status: DISCONTINUED | OUTPATIENT
Start: 2023-10-05 | End: 2023-10-11 | Stop reason: HOSPADM

## 2023-10-05 RX ORDER — LIDOCAINE HYDROCHLORIDE 10 MG/ML
10 INJECTION INFILTRATION; PERINEURAL ONCE
Status: COMPLETED | OUTPATIENT
Start: 2023-10-05 | End: 2023-10-05

## 2023-10-05 RX ORDER — GADOBUTROL 604.72 MG/ML
10 INJECTION INTRAVENOUS
Status: COMPLETED | OUTPATIENT
Start: 2023-10-05 | End: 2023-10-05

## 2023-10-05 RX ADMIN — GABAPENTIN 800 MG: 400 CAPSULE ORAL at 03:10

## 2023-10-05 RX ADMIN — FAMOTIDINE 20 MG: 20 TABLET ORAL at 09:10

## 2023-10-05 RX ADMIN — FAMOTIDINE 20 MG: 20 TABLET ORAL at 08:10

## 2023-10-05 RX ADMIN — GADOBUTROL 10 ML: 604.72 INJECTION INTRAVENOUS at 02:10

## 2023-10-05 RX ADMIN — MORPHINE SULFATE 2 MG: 2 INJECTION, SOLUTION INTRAMUSCULAR; INTRAVENOUS at 01:10

## 2023-10-05 RX ADMIN — OXYCODONE HYDROCHLORIDE 10 MG: 10 TABLET ORAL at 05:10

## 2023-10-05 RX ADMIN — ATORVASTATIN CALCIUM 20 MG: 20 TABLET, FILM COATED ORAL at 08:10

## 2023-10-05 RX ADMIN — ONDANSETRON 4 MG: 2 INJECTION INTRAMUSCULAR; INTRAVENOUS at 01:10

## 2023-10-05 RX ADMIN — LIDOCAINE HYDROCHLORIDE 10 ML: 10 INJECTION, SOLUTION INFILTRATION; PERINEURAL at 09:10

## 2023-10-05 RX ADMIN — OXYCODONE HYDROCHLORIDE 10 MG: 10 TABLET ORAL at 09:10

## 2023-10-05 RX ADMIN — HEPARIN SODIUM 5000 UNITS: 5000 INJECTION INTRAVENOUS; SUBCUTANEOUS at 05:10

## 2023-10-05 RX ADMIN — ENOXAPARIN SODIUM 40 MG: 40 INJECTION SUBCUTANEOUS at 05:10

## 2023-10-05 RX ADMIN — GABAPENTIN 800 MG: 400 CAPSULE ORAL at 09:10

## 2023-10-05 RX ADMIN — OXYCODONE HYDROCHLORIDE 10 MG: 10 TABLET ORAL at 03:10

## 2023-10-05 RX ADMIN — GABAPENTIN 800 MG: 400 CAPSULE ORAL at 08:10

## 2023-10-05 NOTE — PLAN OF CARE
10/05/23 1137   Post-Acute Status   Post-Acute Authorization Placement   Post-Acute Placement Status Referrals Sent   Coverage MEDICARE - MEDICARE PART A & B   Patient choice form signed by patient/caregiver List with quality metrics by geographic area provided   Discharge Delays None known at this time   Discharge Plan   Discharge Plan A Rehab   Discharge Plan B Skilled Nursing Facility     SW informed by CHW that additional rehab referrals sent via CarePort. SW updated family.    Patient/family provided list of additional facilities in-network with patient's payor plan. Providers that are owned, operated, or affiliated with [facility] are included on the list.     Notified that referral sent to below listed facilities from in-network list based on proximity to home/family support:     1. [facility] Phone: [phone]    2. [facility] [place]: [phone]    3. [facility] Phone: [phone]    4. [facility] Phone: [phone]    5. [facility] Phone: [phone]      Patient/family instructed to identify preference.    Preferred Facility: (if more than 1, listed in order of descending preference)  1.    If an additional preferred facility not listed above is identified, additional referral to be sent. If above facilities unable to accept, will send additional referrals to in-network providers.     1236  SON spoke w/[facility] ([phone]), who requested additional clinicals from Neurology and LP results when available. SON sent requested clinics via CarePort. Will continue to follow for needs.    Francia Sparks, XOCHITL, CSW    Case Management Department

## 2023-10-05 NOTE — SUBJECTIVE & OBJECTIVE
Interval History:  Repeat MRI is negative for any new or acute findings.  Neurology performed a bedside LP on 10/05/2023.  Patient affirms no motor strength in her lower extremities today.  Affirms no bowel or bladder urge sensation.  She is very worried about her condition    Review of Systems  Objective:     Vital Signs (Most Recent):  Temp: 98.2 °F (36.8 °C) (10/05/23 1610)  Pulse: 75 (10/05/23 1610)  Resp: 16 (10/05/23 1610)  BP: (!) 128/56 (10/05/23 1610)  SpO2: 99 % (10/05/23 1610) Vital Signs (24h Range):  Temp:  [98.2 °F (36.8 °C)-99.5 °F (37.5 °C)] 98.2 °F (36.8 °C)  Pulse:  [73-92] 75  Resp:  [16-19] 16  SpO2:  [92 %-99 %] 99 %  BP: (107-133)/(51-69) 128/56     Weight: 91.6 kg (201 lb 15.1 oz)  Body mass index is 34.66 kg/m².    Intake/Output Summary (Last 24 hours) at 10/5/2023 1641  Last data filed at 10/5/2023 1606  Gross per 24 hour   Intake 720 ml   Output 2375 ml   Net -1655 ml         Physical Exam      Clear lungs bilaterally, unlabored breathing, on room air, no cyanosis  Heart sounds indicate a regular rate and rhythm   Awake alert, occasionally tearful, anxious   5/5 proximal upper extremity strength bilaterally   Pupils equal bilaterally   No facial droop, no slurred speech   0/5 lower extremity movement noted  No obvious lower extremity edema   Hernandez catheter in place

## 2023-10-05 NOTE — ASSESSMENT & PLAN NOTE
67 y.o. female with HTN, GERD, depression and tobacco abuse presented 9/25/23 with BLE plegia that began abruptly after lifting her rollator with groceries on 9/18. Says over a matter of 3 hours, she was unable to move her legs at all and had abnormal sensation from waist down including lack of urge to urinate and have a bowel movement. Given rapid progression of symptoms concern for vascular etiology from injury. MRI showed anterior cord involvement in the distal T spine with owl eye sign c/w ischemia. Conus enhancement is unusual and raises the question of concurrent demyelinating polyneuropathy in light of recent colitis.     10/5--repeat MRI overnight showed persistent abnormal central cord edema extending from T9 through the tip of the conus with associated increased patchy enhancement and interval development of cauda equina nerve root enhancement. No change in clinical exam    Recommendations:  --awaiting LP, will attempt at bedside.  If unable to tolerate due to positioning and back pain, may need IR LP   --cell ct, glu, pro, CNS demyelinating panel, autoimmune panel ([place]), bands  --following LP, will start IV Solumedrol 1 gram qd x 3 days  --message sent to Neuroimmunology clinic support staff to arrange follow-up with Dr. Ware in next few weeks to review pending serum and CSF studies  --PT/OT   --pain control per primary team

## 2023-10-05 NOTE — PLAN OF CARE
10/05/23 1451   Post-Acute Status   Post-Acute Authorization Placement   Post-Acute Placement Status Set-up Complete/Auth obtained   Coverage MEDICARE - MEDICARE PART A & B   Discharge Delays None known at this time   Discharge Plan   Discharge Plan A Rehab   Discharge Plan B Skilled Nursing Facility     SON benson w/[facility], [facility] ([facility]) ([phone]) and they have accepted pt. [facility] was pt's preference as well. Updated pt/family.    Francia Sparks, XOCHITL, CSW    Case Management Department

## 2023-10-05 NOTE — ASSESSMENT & PLAN NOTE
sudden onset bilateral lower extremity paresis, urinary retention and paresthesia that began after heavy lifting on 9/18/23.   OSH MRI cervical and thoracic spine revealed stenosis and T9-L1 central cord edema with mild enhancement. MRI Brain w/wo 9/25: patchy periventricular white matter T2/flair hyperintensities demylination vs microvascular disease  MRI C/T/L spine 9/25: Abnormal cord edema from T9 to conus  MRI spine demyelinating/MRA: possible diffusion restriction in distal thoracic spine/conus, non diagnostic MRA  --Cord findings suspicious for mass vs ischemia vs demyelinating disorder  -serum NMO negative  -Spinal angiogram on 10/2 neg for ischemia  -repeat MRI on 10/5 unremarkable for any new changes; s/p bedside LP by neurology   - PT/OT

## 2023-10-05 NOTE — PROGRESS NOTES
[facility] ([facility])  [facility] Medicine  Progress Note    Patient Name: Rhina Forrest  MRN: [id number]  Patient Class: IP- Inpatient   Admission Date: 9/25/2023  Length of Stay: 10 days  Attending Physician: James Baker MD  Primary Care Provider: Cal Alvarado FNP-C        Subjective:     Principal Problem:Acute paraplegia        HPI:  Copied from Neuro-crit care team:  68 yo F PMHx GERD, HTN, smoker, depression presenting with bilateral lower extremity plegia that began after heavy lifting on 9/18/23. Pt walks with a walker at baseline and lives at home alone. On 9/18 she was walking home with groceries on her walker and lifted the walker over a curb and hurt her back. Pt was able to walk home and put her groceries away. Once she sat on the couch, she was unable to get back up as her legs stopped moving. She also had decreased sensation from T12 down. Pt called 911 and was taken via EMS to [facility]. MRI cervical and thoracic spine revealed stenosis and T9-L1 cord edema with mild enhancement suspicious for evolving cord infarct vs demyelinating process per radiology report (no imaging was sent with patient). Pt was found to have NSTEMI with troponin 1.1 and was started on ASA and plavix. Pt also c/o bl upper quadrant abdominal pain and CT abdomen pelvis revealed colitis. She was started on cipro and flagyl. Pt has had a barber since 9/18 and has not had any bowel movements since 9/18 despite aggressive bowel regimen at OSH. Transferred to [facility] for neurosurgical evaluation and further workup. Pt currently c/o severe abdominal pain, n/v, severe back pain, BUE rash x 1 month, BLE plegia and T12 sensory level.          Overview/Hospital Course:  ICU course - remarkable for bowel decompression with laxatives - rectal tube inserted, improved abdominal pain, discontinuation of all antibiotics, pain control issues (better on current rx). Stepped down to  10/1/23. On 10/2/23,  IR performed spinal angiogram - came back normal. NSGy recommended Neurology consult for LP and signed off. Hernandez catheter inserted the same day for urinary retention.  Repeat MRI is negative for any new or acute findings.  Neurology performed a bedside LP on 10/05/2023.            Interval History:  Repeat MRI is negative for any new or acute findings.  Neurology performed a bedside LP on 10/05/2023.  Patient affirms no motor strength in her lower extremities today.  Affirms no bowel or bladder urge sensation.  She is very worried about her condition    Review of Systems  Objective:     Vital Signs (Most Recent):  Temp: 98.2 °F (36.8 °C) (10/05/23 1610)  Pulse: 75 (10/05/23 1610)  Resp: 16 (10/05/23 1610)  BP: (!) 128/56 (10/05/23 1610)  SpO2: 99 % (10/05/23 1610) Vital Signs (24h Range):  Temp:  [98.2 °F (36.8 °C)-99.5 °F (37.5 °C)] 98.2 °F (36.8 °C)  Pulse:  [73-92] 75  Resp:  [16-19] 16  SpO2:  [92 %-99 %] 99 %  BP: (107-133)/(51-69) 128/56     Weight: 91.6 kg (201 lb 15.1 oz)  Body mass index is 34.66 kg/m².    Intake/Output Summary (Last 24 hours) at 10/5/2023 1641  Last data filed at 10/5/2023 1606  Gross per 24 hour   Intake 720 ml   Output 2375 ml   Net -1655 ml         Physical Exam      Clear lungs bilaterally, unlabored breathing, on room air, no cyanosis  Heart sounds indicate a regular rate and rhythm   Awake alert, occasionally tearful, anxious   5/5 proximal upper extremity strength bilaterally   Pupils equal bilaterally   No facial droop, no slurred speech   0/5 lower extremity movement noted  No obvious lower extremity edema   Hernandez catheter in place         Assessment/Plan:      * Acute paraplegia   sudden onset bilateral lower extremity paresis, urinary retention and paresthesia that began after heavy lifting on 9/18/23.   OSH MRI cervical and thoracic spine revealed stenosis and T9-L1 central cord edema with mild enhancement. MRI Brain w/wo 9/25: patchy periventricular white matter T2/flair  hyperintensities demylination vs microvascular disease  MRI C/T/L spine 9/25: Abnormal cord edema from T9 to conus  MRI spine demyelinating/MRA: possible diffusion restriction in distal thoracic spine/conus, non diagnostic MRA  --Cord findings suspicious for mass vs ischemia vs demyelinating disorder  -serum NMO negative  -Spinal angiogram on 10/2 neg for ischemia  -repeat MRI on 10/5 unremarkable for any new changes; s/p bedside LP by neurology   - PT/OT      Urinary retention  Related to suspected spinal cord injury.   CIC while in ICU, had high bladder residual 10/2 >800 on nursing assessment therefore barber catheter insertion ordered  -maintain barber catheter until more mobile and neurologic work up is completed        Colitis  Diagnosed at OSH based on upper quadrant abdominal pain and CT abdomen pelvis suggesting colitis. She was started on cipro and flagyl. Has not had any bowel movements since 9/18 despite aggressive bowel regimen at OSH.   CT a/p 9/26 - Large stool ball within the rectum with a suggestion of some rectal wall thickening.  Mild presacral soft tissue thickening/edema.  Findings raise the question of possible proctitis.  Improved symptoms w bowel movement  Off abx by the time she stepped down to me, has rectal tube in -> removed 10/3 since stool output was minimal  Bowel regimen stopped due to diarrhea        VTE Risk Mitigation (From admission, onward)         Ordered     enoxaparin injection 40 mg  Every 24 hours         10/05/23 0845     IP VTE HIGH RISK PATIENT  Once         09/25/23 1119     Place sequential compression device  Until discontinued         09/25/23 1119     Reason for No Pharmacological VTE Prophylaxis  Once        Question:  Reasons:  Answer:  Risk of Bleeding    09/25/23 1119                Discharge Planning   DEMETRICE: 10/10/2023     Code Status: Full Code   Is the patient medically ready for discharge?: No    Reason for patient still in hospital (select all that apply):  Patient trending condition, Laboratory test, Treatment and Consult recommendations  Discharge Plan A: Rehab   Discharge Delays: None known at this time              James Baker MD  Department of Hospital Medicine   [facility] - Neurosurgery ([facility])

## 2023-10-05 NOTE — PT/OT/SLP PROGRESS
Occupational Therapy   Treatment    Name: Rhina Forrest  MRN: [id number]  Admitting Diagnosis:  Acute paraplegia     Pre-op Diagnosis: * No surgery found *    Recommendations:     Discharge Recommendations: rehabilitation facility  Discharge Equipment Recommendations:  to be determined by next level of care  Barriers to discharge:  None    Assessment:     Rhina Forrest is a 67 y.o. female with a medical diagnosis of Acute paraplegia.  She presents with the following performance deficits affecting function are weakness, gait instability, impaired endurance, impaired balance, impaired self care skills, pain, decreased safety awareness, decreased lower extremity function, impaired coordination, decreased ROM, decreased coordination, impaired functional mobility, impaired fine motor.     Rehab Prognosis:  Good; patient would benefit from acute skilled OT services to address these deficits and reach maximum level of function.       Plan:     Patient to be seen 4 x/week to address the above listed problems via self-care/home management, therapeutic exercises, therapeutic activities, neuromuscular re-education  Plan of Care Expires: 11/03/23  Plan of Care Reviewed with: patient    Subjective     Chief Complaint: c/o pain  Patient/Family Comments/goals: be more independent  Pain/Comfort:  Pain Rating 1: 4/10  Location - Orientation 1: lower  Location 1: back  Pain Rating Post-Intervention 1: 4/10  Location - Orientation 2: lower  Location 2: back  Pain Addressed 2: Reposition, Distraction    Objective:     Communicated with: nurse Azul prior to session.  Patient found  seated in wheelchair  with SCD, barber catheter, telemetry upon OT entry to room.    General Precautions: Standard, fall    Orthopedic Precautions:N/A  Braces: N/A  Respiratory Status: Room air     Occupational Performance:     Bed Mobility:    Patient completed Sit to Supine with maximal assistance with HOB flat using bed rail.    Functional  Mobility/Transfers:  Patient completed Bed <wheelchair Transfer using Slide Board technique with total assistance and of 2 persons with slide board  Functional Mobility: deferred 2/2 patient fatigue. Patient tolerated 1.5 hours in wheelchair before requesting to return back to bed 2/2 increase back pain.    Activities of Daily Living:  NT      [facility] 6 Click ADL: 9    Treatment & Education:  Patient educated on OT POC, goals, current progress, safe functional transfer techniques, and fall prevention strategies. Patient required max cues for hand placement, weight shifting, slide board placement, and proper body mechanics. Patient would benefit from wheelchair built up for next transfer. Addressed all patient questions/concerns within LEON scope of practice. Linens changed prior to returning to bed upon patient request. Nurse notified of patient actively having incontinence of BM end of therapy.     Patient left HOB elevated with all lines intact, call button in reach, bed alarm on, and nurse notified.    GOALS:   Multidisciplinary Problems       Occupational Therapy Goals          Problem: Occupational Therapy    Goal Priority Disciplines Outcome Interventions   Occupational Therapy Goal     OT, PT/OT Ongoing, Progressing    Description: Goals to be met by: 11/3/23    Patient will increase functional independence with ADLs by performing:    UE Dressing with [place].  LE Dressing with Moderate Assistance.  Grooming while seated at sink with Minimal Assistance.  Toileting from bedside commode with Moderate Assistance for hygiene and clothing management.   Toilet transfer to bedside commode with Moderate Assistance.                       Time Tracking:     OT Date of Treatment: 10/05/23  OT Start Time: 1105  OT Stop Time: 1143  OT Total Time (min): 38 min    Billable Minutes:Therapeutic Activity 38    OT/RAHUL: RAHUL     Number of RAHUL visits since last OT visit: 2    10/5/2023

## 2023-10-05 NOTE — PLAN OF CARE
[facility] - Neurosurgery ([facility])  Discharge Reassessment    Per MD team, pt is not medically ready to d/c at this time. DEMETRICE updated to 10/10/23 for now. Pt to have LP today.    Plan is to d/c to rehab when ready. Per CHW, [facility] is still reviewing pt's case and should have a decision later today. CHW provided [facility] w/SW's number for f/u and sent additional rehab referrals via [facility]. Updated pt. Will continue to follow for needs.    Primary Care Provider: Cal Alvarado FNP-C    Expected Discharge Date: 10/10/2023    Reassessment (most recent)       Discharge Reassessment - 10/05/23 1129          Discharge Reassessment    Assessment Type Discharge Planning Reassessment     Did the patient's condition or plan change since previous assessment? No     Discharge Plan discussed with: Patient     Communicated DEMETRICE with patient/caregiver Yes (P)      Discharge Plan A Rehab (P)      Discharge Plan B Skilled Nursing Facility (P)      DME Needed Upon Discharge  other (see comments) (P)    TBD    Why the patient remains in the hospital Requires continued medical care (P)         Post-Acute Status    Post-Acute Authorization Placement (P)      Post-Acute Placement Status Referrals Sent (P)      Coverage MEDICARE - MEDICARE PART A & B (P)      Discharge Delays None known at this time (P)                    Francia Sparks, XOCHITL, CSW    Case Management Department
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
## 2023-10-05 NOTE — SUBJECTIVE & OBJECTIVE
"  Subjective:     Interval History:   No change in exam, still has sensory level around umbilicus  Repeat MRI C/T/L spine W WO contrast with "persistent abnormal central cord edema T9-tip of conus with patchy enhancement   and interval development of cauda equina nerve root enhancement"   Awaiting LP    Current Facility-Administered Medications   Medication Dose Route Frequency Provider Last Rate Last Admin    atorvastatin tablet 20 mg  20 mg Oral Daily Miinea, Jud, NP   20 mg at 10/05/23 0819    diazePAM tablet 5 mg  5 mg Oral Q4H PRN Miinea, Jud, NP   5 mg at 10/04/23 0540    enoxaparin injection 40 mg  40 mg Subcutaneous Q24H (prophylaxis, 1700) James Baker MD        famotidine tablet 20 mg  20 mg Oral BID Love, Clemencia L, NP   20 mg at 10/05/23 0819    gabapentin capsule 800 mg  800 mg Oral TID Miinea, Jud, NP   800 mg at 10/05/23 0819    ondansetron injection 4 mg  4 mg Intravenous Q8H PRN Miinea, Jud, NP   4 mg at 10/05/23 0114    oxyCODONE immediate release tablet 5 mg  5 mg Oral Q4H PRN Miinea, Jud, NP   5 mg at 10/01/23 1632    oxyCODONE immediate release tablet Tab 10 mg  10 mg Oral Q4H PRN Miinea, Jud, NP   10 mg at 10/05/23 0949    polyethylene glycol packet 17 g  17 g Oral Daily Love, Clemencia L, NP   17 g at 10/04/23 0857    simethicone chewable tablet 80 mg  1 tablet Oral TID PRN Miinea, Jud, NP   80 mg at 10/03/23 1326    sodium chloride 0.9% flush 10 mL  10 mL Intravenous PRN Tenzin Verdin MD        sodium chloride 0.9% flush 10 mL  10 mL Intravenous PRN Ronnie Gardner MD         Review of Systems   Constitutional:  Positive for activity change and fatigue. Negative for fever.   HENT:  Negative for trouble swallowing and voice change.    Eyes:  Negative for visual disturbance.   Respiratory:  Negative for shortness of breath.    Cardiovascular:  Negative for chest pain and leg swelling.   Gastrointestinal:  Negative for nausea and vomiting.   Genitourinary:  Positive for " difficulty urinating.   Musculoskeletal:  Positive for back pain and gait problem. Negative for neck stiffness.   Neurological:  Positive for weakness and numbness. Negative for tremors, seizures, syncope, facial asymmetry and speech difficulty.   Psychiatric/Behavioral:  Positive for dysphoric mood. Negative for confusion. The patient is nervous/anxious.      Objective:     Vital Signs (Most Recent):  Temp: 98.5 °F (36.9 °C) (10/05/23 1141)  Pulse: 75 (10/05/23 1141)  Resp: 16 (10/05/23 1141)  BP: 126/65 (10/05/23 1141)  SpO2: (!) 92 % (10/05/23 1141) Vital Signs (24h Range):  Temp:  [97.2 °F (36.2 °C)-99.5 °F (37.5 °C)] 98.5 °F (36.9 °C)  Pulse:  [73-92] 75  Resp:  [16-19] 16  SpO2:  [92 %-95 %] 92 %  BP: (107-133)/(51-69) 126/65     Weight: 91.6 kg (201 lb 15.1 oz)  Body mass index is 34.66 kg/m².     Physical Exam  Eyes:      Extraocular Movements: EOM normal.      Pupils: Pupils are equal, round, and reactive to light.   Neurological:      Mental Status: She is oriented to person, place, and time.      Coordination: Finger-Nose-Finger Test normal.      Deep Tendon Reflexes:      Reflex Scores:       Bicep reflexes are 2+ on the right side and 2+ on the left side.       Brachioradialis reflexes are 2+ on the right side and 2+ on the left side.       Patellar reflexes are 0 on the right side and 0 on the left side.       Achilles reflexes are 0 on the right side and 0 on the left side.  Psychiatric:         Speech: Speech normal.          NEUROLOGICAL EXAMINATION:     MENTAL STATUS   Oriented to person, place, and time.   Follows 2 step commands.   Attention: normal. Concentration: normal.   Speech: speech is normal   Level of consciousness: alert  Knowledge: good.   Normal comprehension.     CRANIAL NERVES     CN III, IV, VI   Pupils are equal, round, and reactive to light.  Extraocular motions are normal.   Nystagmus: none   Diplopia: none  Ophthalmoparesis: none    CN V   Facial sensation intact.     CN VII    Facial expression full, symmetric.     CN VIII   Hearing: intact    CN IX, X   Palate: symmetric    CN XI   CN XI normal.     CN XII   CN XII normal.     MOTOR EXAM   Muscle bulk: normal  Right arm pronator drift: absent  Left arm pronator drift: absent    Strength   Right deltoid: 5/5  Left deltoid: 5/5  Right biceps: 5/5  Left biceps: 5/5  Right triceps: 5/5  Left triceps: 5/5  Right interossei: 5/5  Left interossei: 5/5       BLE plegia  Unable to wiggle toes or move legs off mattress     REFLEXES     Reflexes   Right brachioradialis: 2+  Left brachioradialis: 2+  Right biceps: 2+  Left biceps: 2+  Right patellar: 0  Left patellar: 0  Right achilles: 0  Left achilles: 0    SENSORY EXAM   Right leg light touch: decreased from knee  Left leg light touch: decreased from knee  Right leg vibration: decreased from knee  Left leg vibration: decreased from knee    GAIT AND COORDINATION     Gait  Gait: (deferred)     Coordination   Finger to nose coordination: normal    Tremor   Resting tremor: absent    Significant Labs: All pertinent lab results from the past 24 hours have been reviewed.    Pending:  CSF: cell ct with diff, glu, protein, NMO, MS, HSV, VZV, Enterovirus, WNV, VDRL, paraneo, autoimmune panel, ACE  Serum: CNS demyelinating disease panel, oligoclonal bands, ACE, paraneoplastic and autoimmune panel    Significant Imaging: I have reviewed all pertinent imaging results/findings within the past 24 hours.    MRI C/T/L spine W WO contrast (10/5/23):  Persistent abnormal central cord edema extending from T9 through the tip of the conus with associated increased patchy enhancement and interval development of cauda equina nerve root enhancement, findings that favor sequela of transverse myelitis/demyelinating disease or a post infectious process.  A subacute infarct is felt to be less likely given aforementioned nerve root enhancement.

## 2023-10-05 NOTE — PROGRESS NOTES
"[facility] ([facility])  Neurology  Progress Note    Patient Name: Rhina Forrest  MRN: [id number]  Admission Date: 9/25/2023  Hospital Length of Stay: 10 days  Code Status: Full Code   Attending Provider: James Baker MD  Primary Care Physician: Cal Alvarado, ROCIOP-SALONI   Principal Problem:Acute paraplegia    HPI:   67 y.o. female with HTN, GERD, depression and tobacco abuse presented 9/25/23 with BLE plegia that began abruptly after lifting her rollator with groceries. Pt reports using a rollator x 3 years after a L knee injury (ACL, meniscus). She walked to [employer] to get groceries and tried to lift her walker and felt a pop. She knew she injured something in her back. She was able to make it home and walk to the bathroom, felt weak getting off the toilet, but was able to make it to her couch. Once lying down on the sofa, she was unable to get back up and called 911. Says over a matter of 3 hours, she was unable to move her legs at all and had abnormal sensation from waist down including lack of urge to urinate and have a bowel movement. She was brought to [facility] and underwent MRI neuro-axis showing T9-L1 central cord edema. CT Ab/pelvis also showed colitis and she was treated with cipro and flagyl. She was transferred to [facility] for higher level of care/NSGY evaluation. Underwent repeating imaging of neuro-axis and spinal angiogram (10/2) that was unremarkable. Neurology consulted 10/4 for evaluation of transverse myelitis and consideration for LP. Pt still with ongoing BLE plegia, urinary retention s/p barber catheter.     Subjective:     Interval History:   No change in exam, still has sensory level around umbilicus  Repeat MRI C/T/L spine W WO contrast with "persistent abnormal central cord edema T9-tip of conus with patchy enhancement   and interval development of cauda equina nerve root enhancement"   Awaiting LP    Current Facility-Administered Medications   Medication Dose Route " Frequency Provider Last Rate Last Admin    atorvastatin tablet 20 mg  20 mg Oral Daily Miinea, Jud, NP   20 mg at 10/05/23 0819    diazePAM tablet 5 mg  5 mg Oral Q4H PRN Miinea, Jud, NP   5 mg at 10/04/23 0540    enoxaparin injection 40 mg  40 mg Subcutaneous Q24H (prophylaxis, 1700) James Baker MD        famotidine tablet 20 mg  20 mg Oral BID Yolanda, Clemencia L, NP   20 mg at 10/05/23 0819    gabapentin capsule 800 mg  800 mg Oral TID Miinea, Jud, NP   800 mg at 10/05/23 0819    ondansetron injection 4 mg  4 mg Intravenous Q8H PRN Miinea, Jud, NP   4 mg at 10/05/23 0114    oxyCODONE immediate release tablet 5 mg  5 mg Oral Q4H PRN Miinea, Jud, NP   5 mg at 10/01/23 1632    oxyCODONE immediate release tablet Tab 10 mg  10 mg Oral Q4H PRN Micorbin, Jud, NP   10 mg at 10/05/23 0949    polyethylene glycol packet 17 g  17 g Oral Daily Yolanda, Clemencia L, NP   17 g at 10/04/23 0857    simethicone chewable tablet 80 mg  1 tablet Oral TID PRN Micorbin, Jud, NP   80 mg at 10/03/23 1326    sodium chloride 0.9% flush 10 mL  10 mL Intravenous PRN Tenzin Verdin MD        sodium chloride 0.9% flush 10 mL  10 mL Intravenous PRN Ronnie Gardner MD         Review of Systems   Constitutional:  Positive for activity change and fatigue. Negative for fever.   HENT:  Negative for trouble swallowing and voice change.    Eyes:  Negative for visual disturbance.   Respiratory:  Negative for shortness of breath.    Cardiovascular:  Negative for chest pain and leg swelling.   Gastrointestinal:  Negative for nausea and vomiting.   Genitourinary:  Positive for difficulty urinating.   Musculoskeletal:  Positive for back pain and gait problem. Negative for neck stiffness.   Neurological:  Positive for weakness and numbness. Negative for tremors, seizures, syncope, facial asymmetry and speech difficulty.   Psychiatric/Behavioral:  Positive for dysphoric mood. Negative for confusion. The patient is nervous/anxious.       Objective:     Vital Signs (Most Recent):  Temp: 98.5 °F (36.9 °C) (10/05/23 1141)  Pulse: 75 (10/05/23 1141)  Resp: 16 (10/05/23 1141)  BP: 126/65 (10/05/23 1141)  SpO2: (!) 92 % (10/05/23 1141) Vital Signs (24h Range):  Temp:  [97.2 °F (36.2 °C)-99.5 °F (37.5 °C)] 98.5 °F (36.9 °C)  Pulse:  [73-92] 75  Resp:  [16-19] 16  SpO2:  [92 %-95 %] 92 %  BP: (107-133)/(51-69) 126/65     Weight: 91.6 kg (201 lb 15.1 oz)  Body mass index is 34.66 kg/m².     Physical Exam  Eyes:      Extraocular Movements: EOM normal.      Pupils: Pupils are equal, round, and reactive to light.   Neurological:      Mental Status: She is oriented to person, place, and time.      Coordination: Finger-Nose-Finger Test normal.      Deep Tendon Reflexes:      Reflex Scores:       Bicep reflexes are 2+ on the right side and 2+ on the left side.       Brachioradialis reflexes are 2+ on the right side and 2+ on the left side.       Patellar reflexes are 0 on the right side and 0 on the left side.       Achilles reflexes are 0 on the right side and 0 on the left side.  Psychiatric:         Speech: Speech normal.          NEUROLOGICAL EXAMINATION:     MENTAL STATUS   Oriented to person, place, and time.   Follows 2 step commands.   Attention: normal. Concentration: normal.   Speech: speech is normal   Level of consciousness: alert  Knowledge: good.   Normal comprehension.     CRANIAL NERVES     CN III, IV, VI   Pupils are equal, round, and reactive to light.  Extraocular motions are normal.   Nystagmus: none   Diplopia: none  Ophthalmoparesis: none    CN V   Facial sensation intact.     CN VII   Facial expression full, symmetric.     CN VIII   Hearing: intact    CN IX, X   Palate: symmetric    CN XI   CN XI normal.     CN XII   CN XII normal.     MOTOR EXAM   Muscle bulk: normal  Right arm pronator drift: absent  Left arm pronator drift: absent    Strength   Right deltoid: 5/5  Left deltoid: 5/5  Right biceps: 5/5  Left biceps: 5/5  Right triceps:  5/5  Left triceps: 5/5  Right interossei: 5/5  Left interossei: 5/5       BLE plegia  Unable to wiggle toes or move legs off mattress     REFLEXES     Reflexes   Right brachioradialis: 2+  Left brachioradialis: 2+  Right biceps: 2+  Left biceps: 2+  Right patellar: 0  Left patellar: 0  Right achilles: 0  Left achilles: 0    SENSORY EXAM   Right leg light touch: decreased from knee  Left leg light touch: decreased from knee  Right leg vibration: decreased from knee  Left leg vibration: decreased from knee    GAIT AND COORDINATION     Gait  Gait: (deferred)     Coordination   Finger to nose coordination: normal    Tremor   Resting tremor: absent    Significant Labs: All pertinent lab results from the past 24 hours have been reviewed.    Pending:  CSF: cell ct with diff, glu, protein, NMO, MS, HSV, VZV, Enterovirus, WNV, VDRL, paraneo, autoimmune panel, ACE  Serum: CNS demyelinating disease panel, oligoclonal bands, ACE, paraneoplastic and autoimmune panel    Significant Imaging: I have reviewed all pertinent imaging results/findings within the past 24 hours.    MRI C/T/L spine W WO contrast (10/5/23):  Persistent abnormal central cord edema extending from T9 through the tip of the conus with associated increased patchy enhancement and interval development of cauda equina nerve root enhancement, findings that favor sequela of transverse myelitis/demyelinating disease or a post infectious process.  A subacute infarct is felt to be less likely given aforementioned nerve root enhancement.    Assessment and Plan:     Paraplegia  67 y.o. female with HTN, GERD, depression and tobacco abuse presented 9/25/23 with BLE plegia that began abruptly after lifting her rollator with groceries on 9/18. Says over a matter of 3 hours, she was unable to move her legs at all and had abnormal sensation from waist down including lack of urge to urinate and have a bowel movement. Given rapid progression of symptoms concern for vascular  etiology from injury. MRI showed anterior cord involvement in the distal T spine with owl eye sign c/w ischemia. Conus enhancement is unusual and raises the question of concurrent demyelinating polyneuropathy in light of recent colitis.     10/5--repeat MRI overnight showed persistent abnormal central cord edema extending from T9 through the tip of the conus with associated increased patchy enhancement and interval development of cauda equina nerve root enhancement. No change in clinical exam    Recommendations:  --awaiting LP, will attempt at bedside.  If unable to tolerate due to positioning and back pain, may need IR LP   --cell ct, glu, pro, CNS demyelinating panel, autoimmune panel ([place]), bands  --following LP, will start IV Solumedrol 1 gram qd x 3 days  --message sent to Neuroimmunology clinic support staff to arrange follow-up with Dr. Ware in next few weeks to review pending serum and CSF studies  --PT/OT   --pain control per primary team    VTE Risk Mitigation (From admission, onward)         Ordered     enoxaparin injection 40 mg  Every 24 hours         10/05/23 0845     IP VTE HIGH RISK PATIENT  Once         09/25/23 1119     Place sequential compression device  Until discontinued         09/25/23 1119     Reason for No Pharmacological VTE Prophylaxis  Once        Question:  Reasons:  Answer:  Risk of Bleeding    09/25/23 1119              Will continue to follow along. Please call with questions/clarifications    Lakshmi White PA-C  General Neurology Consult

## 2023-10-05 NOTE — PROCEDURES
"Rhina Forrest is a 67 y.o. female patient.    Temp: 98.5 °F (36.9 °C) (10/05/23 1141)  Pulse: 75 (10/05/23 1141)  Resp: 16 (10/05/23 1141)  BP: 126/65 (10/05/23 1141)  SpO2: (!) 92 % (10/05/23 1141)  Weight: 91.6 kg (201 lb 15.1 oz) (10/02/23 0956)  Height: 5' 4" (162.6 cm) (10/02/23 0956)       Lumbar Puncture    Date/Time: 10/5/2023 3:56 PM  Location procedure was performed: [facility] NEUROSCIENCE PROGRESSIVE UNIT    Performed by: Babar Sharp MD  Authorized by: Babar Sharp MD  Assisting provider: Tio Aguilar MD  Pre-operative diagnosis:  Acute paraplegia  Post-operative diagnosis: acute paraplegia  Consent Done: Yes  Indications: myelitis evaluation.    Anesthesia:  Local Anesthetic: lidocaine 1% without epinephrine  Anesthetic total: 10 mL    Patient sedated: no  Preparation: Patient was prepped and draped in the usual sterile fashion.  Lumbar space: L3-L4 interspace  Patient's position: sitting  Needle type: spinal needle - Quincke tip  Number of attempts: 1  Opening pressure: 29 cm H2O  Fluid appearance: clear  Tubes of fluid: 4  Total volume: 29 ml  Post-procedure: site cleaned, pressure dressing applied and adhesive bandage applied  Complications: No  Estimated blood loss (mL): 1  Specimens: No  Implants: No  Patient tolerance: Patient tolerated the procedure well with no immediate complications          10/5/2023    "

## 2023-10-05 NOTE — PLAN OF CARE
VSS  No signs of evident distress.  Pt. With complaints of back pain.  Pain medication admin as per MAR  Worked with PT/OT.  Sat up in wheelchair for approx. 2 hours  Indwelling urinary catheter with clear yellow urine.  400mL UOP this shift  One BM noted this shift  Right AC 20g PIV removed.  Right AC 22g PIV placed.  Dressing CDI.  Patient tolerated well.  Left sided limb alert in place.  No indication in chart as to reason for limb alert.  When asked pt. States she has bone spurs in her left upper arm.  LP complete.  Samples sent to lab for analysis.    Bed in lowest locked position.  Call light within reach.  Instructed to call for assistance.  Pt. Expressed understanding  Educated on plan of care.

## 2023-10-06 LAB
ACE SERPL-CCNC: 49 U/L (ref 16–85)
ALBUMIN SERPL BCP-MCNC: 2 G/DL (ref 3.5–5.2)
ALP SERPL-CCNC: 143 U/L (ref 55–135)
ALT SERPL W/O P-5'-P-CCNC: 48 U/L (ref 10–44)
ANION GAP SERPL CALC-SCNC: 5 MMOL/L (ref 8–16)
AST SERPL-CCNC: 30 U/L (ref 10–40)
BASOPHILS # BLD AUTO: 0.04 K/UL (ref 0–0.2)
BASOPHILS NFR BLD: 1.1 % (ref 0–1.9)
BILIRUB SERPL-MCNC: 0.5 MG/DL (ref 0.1–1)
BUN SERPL-MCNC: 10 MG/DL (ref 8–23)
CALCIUM SERPL-MCNC: 8.7 MG/DL (ref 8.7–10.5)
CHLORIDE SERPL-SCNC: 102 MMOL/L (ref 95–110)
CO2 SERPL-SCNC: 25 MMOL/L (ref 23–29)
CREAT SERPL-MCNC: 0.6 MG/DL (ref 0.5–1.4)
DIFFERENTIAL METHOD: ABNORMAL
EOSINOPHIL # BLD AUTO: 0.1 K/UL (ref 0–0.5)
EOSINOPHIL NFR BLD: 3 % (ref 0–8)
ERYTHROCYTE [DISTWIDTH] IN BLOOD BY AUTOMATED COUNT: 12.2 % (ref 11.5–14.5)
EST. GFR  (NO RACE VARIABLE): >60 ML/MIN/1.73 M^2
GLUCOSE SERPL-MCNC: 111 MG/DL (ref 70–110)
HCT VFR BLD AUTO: 30.6 % (ref 37–48.5)
HGB BLD-MCNC: 10.1 G/DL (ref 12–16)
IMM GRANULOCYTES # BLD AUTO: 0.01 K/UL (ref 0–0.04)
IMM GRANULOCYTES NFR BLD AUTO: 0.3 % (ref 0–0.5)
LYMPHOCYTES # BLD AUTO: 1.2 K/UL (ref 1–4.8)
LYMPHOCYTES NFR BLD: 33.5 % (ref 18–48)
MCH RBC QN AUTO: 31.7 PG (ref 27–31)
MCHC RBC AUTO-ENTMCNC: 33 G/DL (ref 32–36)
MCV RBC AUTO: 96 FL (ref 82–98)
MONOCYTES # BLD AUTO: 0.4 K/UL (ref 0.3–1)
MONOCYTES NFR BLD: 10.6 % (ref 4–15)
NEUTROPHILS # BLD AUTO: 1.9 K/UL (ref 1.8–7.7)
NEUTROPHILS NFR BLD: 51.5 % (ref 38–73)
NRBC BLD-RTO: 0 /100 WBC
PHOSPHATE SERPL-MCNC: 3.8 MG/DL (ref 2.7–4.5)
PLATELET # BLD AUTO: 232 K/UL (ref 150–450)
PMV BLD AUTO: 9.3 FL (ref 9.2–12.9)
POTASSIUM SERPL-SCNC: 4.3 MMOL/L (ref 3.5–5.1)
PROT SERPL-MCNC: 5.6 G/DL (ref 6–8.4)
RBC # BLD AUTO: 3.19 M/UL (ref 4–5.4)
SODIUM SERPL-SCNC: 132 MMOL/L (ref 136–145)
WBC # BLD AUTO: 3.67 K/UL (ref 3.9–12.7)

## 2023-10-06 PROCEDURE — 99232 SBSQ HOSP IP/OBS MODERATE 35: CPT | Mod: ,,, | Performed by: PSYCHIATRY & NEUROLOGY

## 2023-10-06 PROCEDURE — 36415 COLL VENOUS BLD VENIPUNCTURE: CPT | Performed by: NURSE PRACTITIONER

## 2023-10-06 PROCEDURE — 11000001 HC ACUTE MED/SURG PRIVATE ROOM

## 2023-10-06 PROCEDURE — 97530 THERAPEUTIC ACTIVITIES: CPT

## 2023-10-06 PROCEDURE — 63600175 PHARM REV CODE 636 W HCPCS: Performed by: HOSPITALIST

## 2023-10-06 PROCEDURE — 25000003 PHARM REV CODE 250: Performed by: HOSPITALIST

## 2023-10-06 PROCEDURE — 25000003 PHARM REV CODE 250: Performed by: NURSE PRACTITIONER

## 2023-10-06 PROCEDURE — 84100 ASSAY OF PHOSPHORUS: CPT | Performed by: NURSE PRACTITIONER

## 2023-10-06 PROCEDURE — 99232 SBSQ HOSP IP/OBS MODERATE 35: CPT | Mod: ,,, | Performed by: HOSPITALIST

## 2023-10-06 PROCEDURE — 25000003 PHARM REV CODE 250

## 2023-10-06 PROCEDURE — 94761 N-INVAS EAR/PLS OXIMETRY MLT: CPT

## 2023-10-06 PROCEDURE — 85025 COMPLETE CBC W/AUTO DIFF WBC: CPT | Performed by: NURSE PRACTITIONER

## 2023-10-06 PROCEDURE — 80053 COMPREHEN METABOLIC PANEL: CPT | Performed by: NURSE PRACTITIONER

## 2023-10-06 PROCEDURE — 99232 PR SUBSEQUENT HOSPITAL CARE,LEVL II: ICD-10-PCS | Mod: ,,, | Performed by: HOSPITALIST

## 2023-10-06 PROCEDURE — 63600175 PHARM REV CODE 636 W HCPCS

## 2023-10-06 PROCEDURE — 99232 PR SUBSEQUENT HOSPITAL CARE,LEVL II: ICD-10-PCS | Mod: ,,, | Performed by: PSYCHIATRY & NEUROLOGY

## 2023-10-06 RX ORDER — DIPHENHYDRAMINE HCL 25 MG
25 CAPSULE ORAL ONCE
Status: COMPLETED | OUTPATIENT
Start: 2023-10-06 | End: 2023-10-06

## 2023-10-06 RX ADMIN — GABAPENTIN 800 MG: 400 CAPSULE ORAL at 03:10

## 2023-10-06 RX ADMIN — GABAPENTIN 800 MG: 400 CAPSULE ORAL at 08:10

## 2023-10-06 RX ADMIN — ATORVASTATIN CALCIUM 20 MG: 20 TABLET, FILM COATED ORAL at 08:10

## 2023-10-06 RX ADMIN — GABAPENTIN 800 MG: 400 CAPSULE ORAL at 09:10

## 2023-10-06 RX ADMIN — POLYETHYLENE GLYCOL 3350 17 G: 17 POWDER, FOR SOLUTION ORAL at 08:10

## 2023-10-06 RX ADMIN — DIPHENHYDRAMINE HYDROCHLORIDE 25 MG: 25 CAPSULE ORAL at 03:10

## 2023-10-06 RX ADMIN — OXYCODONE HYDROCHLORIDE 10 MG: 10 TABLET ORAL at 01:10

## 2023-10-06 RX ADMIN — ENOXAPARIN SODIUM 40 MG: 40 INJECTION SUBCUTANEOUS at 05:10

## 2023-10-06 RX ADMIN — FAMOTIDINE 20 MG: 20 TABLET ORAL at 09:10

## 2023-10-06 RX ADMIN — FAMOTIDINE 20 MG: 20 TABLET ORAL at 08:10

## 2023-10-06 RX ADMIN — METHYLPREDNISOLONE SODIUM SUCCINATE 1000 MG: 1 INJECTION, POWDER, LYOPHILIZED, FOR SOLUTION INTRAMUSCULAR; INTRAVENOUS at 04:10

## 2023-10-06 NOTE — PLAN OF CARE
Safety measures maintained. Patient bed in low position. Bed alarm set. Patient call bell with in reach. Patient belongings with in reach. VSS. Full assessment in chart. PAOLAEO.       Problem: Adult Inpatient Plan of Care  Goal: Plan of Care Review  Outcome: Ongoing, Progressing  Goal: Patient-Specific Goal (Individualized)  Outcome: Ongoing, Progressing  Goal: Absence of Hospital-Acquired Illness or Injury  Outcome: Ongoing, Progressing  Goal: Optimal Comfort and Wellbeing  Outcome: Ongoing, Progressing  Goal: Readiness for Transition of Care  Outcome: Ongoing, Progressing     Problem: Adjustment to Illness (Stroke, Hemorrhagic)  Goal: Optimal Coping  Outcome: Ongoing, Progressing     Problem: Bowel Elimination Impaired (Stroke, Hemorrhagic)  Goal: Effective Bowel Elimination  Outcome: Ongoing, Progressing     Problem: Cerebral Tissue Perfusion (Stroke, Hemorrhagic)  Goal: Optimal Cerebral Tissue Perfusion  Outcome: Ongoing, Progressing     Problem: Cognitive Impairment (Stroke, Hemorrhagic)  Goal: Optimal Cognitive Function  Outcome: Ongoing, Progressing     Problem: Communication Impairment (Stroke, Hemorrhagic)  Goal: Effective Communication Skills  Outcome: Ongoing, Progressing     Problem: Functional Ability Impaired (Stroke, Hemorrhagic)  Goal: Optimal Functional Ability  Outcome: Ongoing, Progressing     Problem: Pain (Stroke, Hemorrhagic)  Goal: Acceptable Pain Control  Outcome: Ongoing, Progressing     Problem: Respiratory Compromise (Stroke, Hemorrhagic)  Goal: Effective Oxygenation and Ventilation  Outcome: Ongoing, Progressing     Problem: Sensorimotor Impairment (Stroke, Hemorrhagic)  Goal: Improved Sensorimotor Function  Outcome: Ongoing, Progressing     Problem: Swallowing Impairment (Stroke, Hemorrhagic)  Goal: Optimal Eating and Swallowing Without Aspiration  Outcome: Ongoing, Progressing     Problem: Urinary Elimination Impaired (Stroke, Hemorrhagic)  Goal: Effective Urinary Elimination  Outcome:  Ongoing, Progressing     Problem: Skin Injury Risk Increased  Goal: Skin Health and Integrity  Outcome: Ongoing, Progressing     Problem: Impaired Wound Healing  Goal: Optimal Wound Healing  Outcome: Ongoing, Progressing     Problem: Infection  Goal: Absence of Infection Signs and Symptoms  Outcome: Ongoing, Progressing

## 2023-10-06 NOTE — SUBJECTIVE & OBJECTIVE
Interval History:  No chest pain, no shortness a breath.  Patient again affirms no motor recovery upper and lower extremities.    Review of Systems  Objective:     Vital Signs (Most Recent):  Temp: 97.3 °F (36.3 °C) (10/06/23 1152)  Pulse: 77 (10/06/23 1152)  Resp: 16 (10/06/23 1318)  BP: 128/71 (10/06/23 1152)  SpO2: (!) 94 % (10/06/23 1152) Vital Signs (24h Range):  Temp:  [97.3 °F (36.3 °C)-99.5 °F (37.5 °C)] 97.3 °F (36.3 °C)  Pulse:  [75-83] 77  Resp:  [16-20] 16  SpO2:  [93 %-99 %] 94 %  BP: (112-134)/(56-71) 128/71     Weight: 91.6 kg (201 lb 15.1 oz)  Body mass index is 34.66 kg/m².    Intake/Output Summary (Last 24 hours) at 10/6/2023 1419  Last data filed at 10/6/2023 0634  Gross per 24 hour   Intake 480 ml   Output 1400 ml   Net -920 ml         Physical Exam      Clear lungs bilaterally, unlabored breathing, on room air, no cyanosis   Heart sounds indicate a regular rate and rhythm  Awake alert, no acute distress   No facial droop, no slurred speech   5/5 proximal upper extremity strength bilaterally  0/5 lower extremity strength   No obvious lower extremity edema   Sitting up in bed

## 2023-10-06 NOTE — PROGRESS NOTES
[facility] - Neurosurgery ([facility])  Neurology  Progress Note    Patient Name: Rhina Forrest  MRN: [id number]  Admission Date: 9/25/2023  Hospital Length of Stay: 11 days  Code Status: Full Code   Attending Provider: James Baker MD  Primary Care Physician: Cal Alvarado, ROCIOP-C   Principal Problem:Acute paraplegia    HPI:   67 y.o. female with HTN, GERD, depression and tobacco abuse presented 9/25/23 with BLE plegia that began abruptly after lifting her rollator with groceries. Pt reports using a rollator x 3 years after a L knee injury (ACL, meniscus). She walked to [employer] to get groceries and tried to lift her walker and felt a pop. She knew she injured something in her back. She was able to make it home and walk to the bathroom, felt weak getting off the toilet, but was able to make it to her couch. Once lying down on the sofa, she was unable to get back up and called 911. Says over a matter of 3 hours, she was unable to move her legs at all and had abnormal sensation from waist down including lack of urge to urinate and have a bowel movement. She was brought to [facility] and underwent MRI neuro-axis showing T9-L1 central cord edema. CT Ab/pelvis also showed colitis and she was treated with cipro and flagyl. She was transferred to [facility] for higher level of care/NSGY evaluation. Underwent repeating imaging of neuro-axis and spinal angiogram (10/2) that was unremarkable. Neurology consulted 10/4 for evaluation of transverse myelitis and consideration for LP. Pt still with ongoing BLE plegia, urinary retention s/p barber catheter.           Overview/Hospital Course:  No notes on file        Subjective:     Interval History:   No change in numbness or weakness. Sensation present at level around umbilicus  LP done, mildly elevated protein and elevated glucose noted. Cell count with 1 WBC, clear with no RBCs  Awaiting ACE, MO, MS, HSV, VZV, Enterovirus, WNV, VDRL, paraneo, autoimmune panel  Patient  still expressing concern regarding initiation of steroids, has yet to start trial of IV solumedrol for 3 days.    Current Facility-Administered Medications   Medication Dose Route Frequency Provider Last Rate Last Admin    atorvastatin tablet 20 mg  20 mg Oral Daily Miinea, Jud, NP   20 mg at 10/06/23 0806    diazePAM tablet 5 mg  5 mg Oral Q4H PRN Miinea, Jud, NP   5 mg at 10/04/23 0540    enoxaparin injection 40 mg  40 mg Subcutaneous Q24H (prophylaxis, 1700) James Baker MD   40 mg at 10/05/23 1704    famotidine tablet 20 mg  20 mg Oral BID Yolanda, Clemencia L, NP   20 mg at 10/06/23 0806    gabapentin capsule 800 mg  800 mg Oral TID Miinea, Jud, NP   800 mg at 10/06/23 0806    ondansetron injection 4 mg  4 mg Intravenous Q8H PRN Miinea, Jud, NP   4 mg at 10/05/23 0114    oxyCODONE immediate release tablet 5 mg  5 mg Oral Q4H PRN Miinea, Jud, NP   5 mg at 10/01/23 1632    oxyCODONE immediate release tablet Tab 10 mg  10 mg Oral Q4H PRN Miinea, Jud, NP   10 mg at 10/05/23 2100    polyethylene glycol packet 17 g  17 g Oral Daily Love, Clemencia L, NP   17 g at 10/06/23 0806    simethicone chewable tablet 80 mg  1 tablet Oral TID PRN Miinea, Jud, NP   80 mg at 10/03/23 1326    sodium chloride 0.9% flush 10 mL  10 mL Intravenous PRN Tenzin Verdin MD        sodium chloride 0.9% flush 10 mL  10 mL Intravenous PRN Ronnie Gardner MD         Review of Systems   Constitutional:  Positive for activity change and fatigue. Negative for fever.   HENT:  Negative for trouble swallowing and voice change.    Eyes:  Negative for visual disturbance.   Respiratory:  Negative for shortness of breath.    Cardiovascular:  Negative for chest pain and leg swelling.   Gastrointestinal:  Negative for nausea and vomiting.   Genitourinary:  Positive for difficulty urinating.   Musculoskeletal:  Positive for back pain and gait problem. Negative for neck stiffness.   Neurological:  Positive for weakness and  numbness. Negative for tremors, seizures, syncope, facial asymmetry and speech difficulty.   Psychiatric/Behavioral:  Positive for dysphoric mood. Negative for confusion. The patient is nervous/anxious.      Objective:     Vital Signs (Most Recent):  Temp: 98.8 °F (37.1 °C) (10/06/23 0721)  Pulse: 78 (10/06/23 0721)  Resp: 18 (10/06/23 0721)  BP: 134/66 (10/06/23 0721)  SpO2: (!) 93 % (10/06/23 0721) Vital Signs (24h Range):  Temp:  [98.2 °F (36.8 °C)-99.5 °F (37.5 °C)] 98.8 °F (37.1 °C)  Pulse:  [75-83] 78  Resp:  [16-20] 18  SpO2:  [92 %-99 %] 93 %  BP: (112-134)/(56-67) 134/66     Weight: 91.6 kg (201 lb 15.1 oz)  Body mass index is 34.66 kg/m².     Physical Exam  Eyes:      Extraocular Movements: EOM normal.      Pupils: Pupils are equal, round, and reactive to light.   Neurological:      Mental Status: She is oriented to person, place, and time.      Coordination: Finger-Nose-Finger Test normal.      Deep Tendon Reflexes:      Reflex Scores:       Bicep reflexes are 2+ on the right side and 2+ on the left side.       Brachioradialis reflexes are 2+ on the right side and 2+ on the left side.       Patellar reflexes are 0 on the right side and 0 on the left side.       Achilles reflexes are 0 on the right side and 0 on the left side.  Psychiatric:         Speech: Speech normal.          NEUROLOGICAL EXAMINATION:     MENTAL STATUS   Oriented to person, place, and time.   Follows 2 step commands.   Attention: normal. Concentration: normal.   Speech: speech is normal   Level of consciousness: alert  Knowledge: good.   Normal comprehension.     CRANIAL NERVES     CN III, IV, VI   Pupils are equal, round, and reactive to light.  Extraocular motions are normal.   Nystagmus: none   Diplopia: none  Ophthalmoparesis: none    CN V   Facial sensation intact.     CN VII   Facial expression full, symmetric.     CN VIII   Hearing: intact    CN IX, X   Palate: symmetric    CN XI   CN XI normal.     CN XII   CN XII normal.      MOTOR EXAM   Muscle bulk: normal  Right arm pronator drift: absent  Left arm pronator drift: absent    Strength   Right deltoid: 5/5  Left deltoid: 5/5  Right biceps: 5/5  Left biceps: 5/5  Right triceps: 5/5  Left triceps: 5/5  Right interossei: 5/5  Left interossei: 5/5       BLE plegia  Unable to wiggle toes or move legs off mattress     REFLEXES     Reflexes   Right brachioradialis: 2+  Left brachioradialis: 2+  Right biceps: 2+  Left biceps: 2+  Right patellar: 0  Left patellar: 0  Right achilles: 0  Left achilles: 0    SENSORY EXAM   Right leg light touch: decreased from knee  Left leg light touch: decreased from knee  Right leg vibration: decreased from knee  Left leg vibration: decreased from knee    GAIT AND COORDINATION     Gait  Gait: (deferred)     Coordination   Finger to nose coordination: normal    Tremor   Resting tremor: absent    Significant Labs: All pertinent lab results from the past 24 hours have been reviewed.    CSF: Protein 41, glucose 94, cell count clear 1 WBC, 0 % lymph    Pending:  CSF: NMO, MS, HSV, VZV, Enterovirus, WNV, VDRL, paraneo, autoimmune panel, ACE  Serum: CNS demyelinating disease panel, oligoclonal bands, ACE, paraneoplastic and autoimmune panel    Significant Imaging: I have reviewed all pertinent imaging results/findings within the past 24 hours.    MRI C/T/L spine W WO contrast (10/5/23):  Persistent abnormal central cord edema extending from T9 through the tip of the conus with associated increased patchy enhancement and interval development of cauda equina nerve root enhancement, findings that favor sequela of transverse myelitis/demyelinating disease or a post infectious process.  A subacute infarct is felt to be less likely given aforementioned nerve root enhancement.    Assessment and Plan:     Paraplegia  67 y.o. female with HTN, GERD, depression and tobacco abuse presented 9/25/23 with BLE plegia that began abruptly after lifting her rollator with  groceries on 9/18. Says over a matter of 3 hours, she was unable to move her legs at all and had abnormal sensation from waist down including lack of urge to urinate and have a bowel movement. Given rapid progression of symptoms concern for vascular etiology from injury. MRI showed anterior cord involvement in the distal T spine with owl eye sign c/w ischemia. Conus enhancement is unusual and raises the question of concurrent demyelinating polyneuropathy in light of recent colitis.     10/5--repeat MRI overnight showed persistent abnormal central cord edema extending from T9 through the tip of the conus with associated increased patchy enhancement and interval development of cauda equina nerve root enhancement. No change in clinical exam    10/6--LP done, CSF clear will cell count 1 WBC, no RBCs. Glucose elevated, protein mildly elevated. Rest of labs currently pending at this time    Recommendations:  --F/U LP results at this time  --Ordered IV Solumedrol 1 gram qd x 3 days. Close monitoring in setting of possible prior allergy to steroids, consider anxiolytic medications.  --Message sent to Neuroimmunology clinic support staff to arrange follow-up with Dr. Ware in next few weeks to review pending serum and CSF studies   --PT/OT. Likely will need long term rehab   --pain control per primary team        VTE Risk Mitigation (From admission, onward)         Ordered     enoxaparin injection 40 mg  Every 24 hours         10/05/23 0845     IP VTE HIGH RISK PATIENT  Once         09/25/23 1119     Place sequential compression device  Until discontinued         09/25/23 1119     Reason for No Pharmacological VTE Prophylaxis  Once        Question:  Reasons:  Answer:  Risk of Bleeding    09/25/23 1119                Jaison Vargas MD PGY-III  Neurology  [facility] - Neurosurgery ([facility])

## 2023-10-06 NOTE — ASSESSMENT & PLAN NOTE
67 y.o. female with HTN, GERD, depression and tobacco abuse presented 9/25/23 with BLE plegia that began abruptly after lifting her rollator with groceries on 9/18. Says over a matter of 3 hours, she was unable to move her legs at all and had abnormal sensation from waist down including lack of urge to urinate and have a bowel movement. Given rapid progression of symptoms concern for vascular etiology from injury. MRI showed anterior cord involvement in the distal T spine with owl eye sign c/w ischemia. Conus enhancement is unusual and raises the question of concurrent demyelinating polyneuropathy in light of recent colitis.     10/5--repeat MRI overnight showed persistent abnormal central cord edema extending from T9 through the tip of the conus with associated increased patchy enhancement and interval development of cauda equina nerve root enhancement. No change in clinical exam    10/6--LP done, CSF clear will cell count 1 WBC, no RBCs. Glucose elevated, protein mildly elevated. Rest of labs currently pending at this time    Recommendations:  --F/U LP results at this time  --Ordered IV Solumedrol 1 gram qd x 3 days. Close monitoring in setting of possible prior allergy to steroids, consider anxiolytic medications.  --Message sent to Neuroimmunology clinic support staff to arrange follow-up with Dr. Ware in next few weeks to review pending serum and CSF studies   --PT/OT. Likely will need long term rehab   --pain control per primary team

## 2023-10-06 NOTE — PT/OT/SLP PROGRESS
Physical Therapy      Patient Name:  Rhina Forrest   MRN:  [id number]    Patient not up for participation in functional mobility 2/2 pain and fatigue. PT assisted pt w/ repositioning in bed, educated on use of bed rails to adjust positioning and HOB elevation as needed. Pt w/ multiple c/o feeling helpless and as if staff isn't addressing her needs, PT provided emotional support and assistance as able. Will continue to follow.    My [facility] PT   Time: 6848-2426  Billing: Therapeutic Activity 15

## 2023-10-06 NOTE — SUBJECTIVE & OBJECTIVE
Subjective:     Interval History:   No change in numbness or weakness. Sensation present at level around umbilicus  LP done, mildly elevated protein and elevated glucose noted. Cell count with 1 WBC, clear with no RBCs  Awaiting ACE, MO, MS, HSV, VZV, Enterovirus, WNV, VDRL, paraneo, autoimmune panel  Patient still expressing concern regarding initiation of steroids, has yet to start trial of IV solumedrol for 3 days.    Current Facility-Administered Medications   Medication Dose Route Frequency Provider Last Rate Last Admin    atorvastatin tablet 20 mg  20 mg Oral Daily Miinea, Jud, NP   20 mg at 10/06/23 0806    diazePAM tablet 5 mg  5 mg Oral Q4H PRN Miinea, Jud, NP   5 mg at 10/04/23 0540    enoxaparin injection 40 mg  40 mg Subcutaneous Q24H (prophylaxis, 1700) James Baker MD   40 mg at 10/05/23 1704    famotidine tablet 20 mg  20 mg Oral BID Yolanda, Clemencia L, NP   20 mg at 10/06/23 0806    gabapentin capsule 800 mg  800 mg Oral TID Miinea, Jud, NP   800 mg at 10/06/23 0806    ondansetron injection 4 mg  4 mg Intravenous Q8H PRN Miinea, Jud, NP   4 mg at 10/05/23 0114    oxyCODONE immediate release tablet 5 mg  5 mg Oral Q4H PRN Miinea, Jud, NP   5 mg at 10/01/23 1632    oxyCODONE immediate release tablet Tab 10 mg  10 mg Oral Q4H PRN Miinea, Jud, NP   10 mg at 10/05/23 2100    polyethylene glycol packet 17 g  17 g Oral Daily Yolanda, Clemencia L, NP   17 g at 10/06/23 0806    simethicone chewable tablet 80 mg  1 tablet Oral TID PRN Miinea, Jud, NP   80 mg at 10/03/23 1326    sodium chloride 0.9% flush 10 mL  10 mL Intravenous PRN Tenzin Verdin MD        sodium chloride 0.9% flush 10 mL  10 mL Intravenous PRN Ronnie Gardner MD         Review of Systems   Constitutional:  Positive for activity change and fatigue. Negative for fever.   HENT:  Negative for trouble swallowing and voice change.    Eyes:  Negative for visual disturbance.   Respiratory:  Negative for shortness of breath.     Cardiovascular:  Negative for chest pain and leg swelling.   Gastrointestinal:  Negative for nausea and vomiting.   Genitourinary:  Positive for difficulty urinating.   Musculoskeletal:  Positive for back pain and gait problem. Negative for neck stiffness.   Neurological:  Positive for weakness and numbness. Negative for tremors, seizures, syncope, facial asymmetry and speech difficulty.   Psychiatric/Behavioral:  Positive for dysphoric mood. Negative for confusion. The patient is nervous/anxious.      Objective:     Vital Signs (Most Recent):  Temp: 98.8 °F (37.1 °C) (10/06/23 0721)  Pulse: 78 (10/06/23 0721)  Resp: 18 (10/06/23 0721)  BP: 134/66 (10/06/23 0721)  SpO2: (!) 93 % (10/06/23 0721) Vital Signs (24h Range):  Temp:  [98.2 °F (36.8 °C)-99.5 °F (37.5 °C)] 98.8 °F (37.1 °C)  Pulse:  [75-83] 78  Resp:  [16-20] 18  SpO2:  [92 %-99 %] 93 %  BP: (112-134)/(56-67) 134/66     Weight: 91.6 kg (201 lb 15.1 oz)  Body mass index is 34.66 kg/m².     Physical Exam  Eyes:      Extraocular Movements: EOM normal.      Pupils: Pupils are equal, round, and reactive to light.   Neurological:      Mental Status: She is oriented to person, place, and time.      Coordination: Finger-Nose-Finger Test normal.      Deep Tendon Reflexes:      Reflex Scores:       Bicep reflexes are 2+ on the right side and 2+ on the left side.       Brachioradialis reflexes are 2+ on the right side and 2+ on the left side.       Patellar reflexes are 0 on the right side and 0 on the left side.       Achilles reflexes are 0 on the right side and 0 on the left side.  Psychiatric:         Speech: Speech normal.          NEUROLOGICAL EXAMINATION:     MENTAL STATUS   Oriented to person, place, and time.   Follows 2 step commands.   Attention: normal. Concentration: normal.   Speech: speech is normal   Level of consciousness: alert  Knowledge: good.   Normal comprehension.     CRANIAL NERVES     CN III, IV, VI   Pupils are equal, round, and reactive to  light.  Extraocular motions are normal.   Nystagmus: none   Diplopia: none  Ophthalmoparesis: none    CN V   Facial sensation intact.     CN VII   Facial expression full, symmetric.     CN VIII   Hearing: intact    CN IX, X   Palate: symmetric    CN XI   CN XI normal.     CN XII   CN XII normal.     MOTOR EXAM   Muscle bulk: normal  Right arm pronator drift: absent  Left arm pronator drift: absent    Strength   Right deltoid: 5/5  Left deltoid: 5/5  Right biceps: 5/5  Left biceps: 5/5  Right triceps: 5/5  Left triceps: 5/5  Right interossei: 5/5  Left interossei: 5/5       BLE plegia  Unable to wiggle toes or move legs off mattress     REFLEXES     Reflexes   Right brachioradialis: 2+  Left brachioradialis: 2+  Right biceps: 2+  Left biceps: 2+  Right patellar: 0  Left patellar: 0  Right achilles: 0  Left achilles: 0    SENSORY EXAM   Right leg light touch: decreased from knee  Left leg light touch: decreased from knee  Right leg vibration: decreased from knee  Left leg vibration: decreased from knee    GAIT AND COORDINATION     Gait  Gait: (deferred)     Coordination   Finger to nose coordination: normal    Tremor   Resting tremor: absent    Significant Labs: All pertinent lab results from the past 24 hours have been reviewed.    CSF: Protein 41, glucose 94, cell count clear 1 WBC, 0 % lymph    Pending:  CSF: NMO, MS, HSV, VZV, Enterovirus, WNV, VDRL, paraneo, autoimmune panel, ACE  Serum: CNS demyelinating disease panel, oligoclonal bands, ACE, paraneoplastic and autoimmune panel    Significant Imaging: I have reviewed all pertinent imaging results/findings within the past 24 hours.    MRI C/T/L spine W WO contrast (10/5/23):  Persistent abnormal central cord edema extending from T9 through the tip of the conus with associated increased patchy enhancement and interval development of cauda equina nerve root enhancement, findings that favor sequela of transverse myelitis/demyelinating disease or a post  infectious process.  A subacute infarct is felt to be less likely given aforementioned nerve root enhancement.

## 2023-10-06 NOTE — ASSESSMENT & PLAN NOTE
sudden onset bilateral lower extremity paresis, urinary retention and paresthesia that began after heavy lifting on 9/18/23.   [facility] MRI cervical and thoracic spine revealed stenosis and T9-L1 central cord edema with mild enhancement. MRI Brain w/wo 9/25: patchy periventricular white matter T2/flair hyperintensities demylination vs microvascular disease  MRI C/T/L spine 9/25: Abnormal cord edema from T9 to conus  MRI spine demyelinating/MRA: possible diffusion restriction in distal thoracic spine/conus, non diagnostic MRA  --Cord findings suspicious for mass vs ischemia vs demyelinating disorder  -serum NMO negative  -Spinal angiogram on 10/2 neg for ischemia  -repeat MRI on 10/5 unremarkable for any new changes; s/p bedside LP by neurology largely unrevealing   - PT/OT  - high-dose steroid trial for 3 days per Neurology

## 2023-10-06 NOTE — PROGRESS NOTES
[facility]ad[facility] - Neurosurgery ([facility])  [facility] Medicine  Progress Note    Patient Name: Rhina Forrest  MRN: [id number]  Patient Class: IP- Inpatient   Admission Date: 9/25/2023  Length of Stay: 11 days  Attending Physician: James Baker MD  Primary Care Provider: Cal Alvarado FNP-C        Subjective:     Principal Problem:Acute paraplegia        HPI:  Copied from Neuro-crit care team:  68 yo F PMHx GERD, HTN, smoker, depression presenting with bilateral lower extremity plegia that began after heavy lifting on 9/18/23. Pt walks with a walker at baseline and lives at home alone. On 9/18 she was walking home with groceries on her walker and lifted the walker over a curb and hurt her back. Pt was able to walk home and put her groceries away. Once she sat on the couch, she was unable to get back up as her legs stopped moving. She also had decreased sensation from T12 down. Pt called 911 and was taken via EMS to [facility]. MRI cervical and thoracic spine revealed stenosis and T9-L1 cord edema with mild enhancement suspicious for evolving cord infarct vs demyelinating process per radiology report (no imaging was sent with patient). Pt was found to have NSTEMI with troponin 1.1 and was started on ASA and plavix. Pt also c/o bl upper quadrant abdominal pain and CT abdomen pelvis revealed colitis. She was started on cipro and flagyl. Pt has had a barber since 9/18 and has not had any bowel movements since 9/18 despite aggressive bowel regimen at OSH. Transferred to [facility] for neurosurgical evaluation and further workup. Pt currently c/o severe abdominal pain, n/v, severe back pain, BUE rash x 1 month, BLE plegia and T12 sensory level.          Overview/Hospital Course:  ICU course - remarkable for bowel decompression with laxatives - rectal tube inserted, improved abdominal pain, discontinuation of all antibiotics, pain control issues (better on current rx). Stepped down to  10/1/23. On 10/2/23,  IR performed spinal angiogram - came back normal. NSGy recommended Neurology consult for LP and signed off. Hernandez catheter inserted the same day for urinary retention.  Repeat MRI is negative for any new or acute findings.  Neurology performed a bedside LP on 10/05/2023 with no significantly elevated WBCs.            Interval History:  No chest pain, no shortness a breath.  Patient again affirms no motor recovery upper and lower extremities.    Review of Systems  Objective:     Vital Signs (Most Recent):  Temp: 97.3 °F (36.3 °C) (10/06/23 1152)  Pulse: 77 (10/06/23 1152)  Resp: 16 (10/06/23 1318)  BP: 128/71 (10/06/23 1152)  SpO2: (!) 94 % (10/06/23 1152) Vital Signs (24h Range):  Temp:  [97.3 °F (36.3 °C)-99.5 °F (37.5 °C)] 97.3 °F (36.3 °C)  Pulse:  [75-83] 77  Resp:  [16-20] 16  SpO2:  [93 %-99 %] 94 %  BP: (112-134)/(56-71) 128/71     Weight: 91.6 kg (201 lb 15.1 oz)  Body mass index is 34.66 kg/m².    Intake/Output Summary (Last 24 hours) at 10/6/2023 1419  Last data filed at 10/6/2023 0634  Gross per 24 hour   Intake 480 ml   Output 1400 ml   Net -920 ml         Physical Exam      Clear lungs bilaterally, unlabored breathing, on room air, no cyanosis   Heart sounds indicate a regular rate and rhythm  Awake alert, no acute distress   No facial droop, no slurred speech   5/5 proximal upper extremity strength bilaterally  0/5 lower extremity strength   No obvious lower extremity edema   Sitting up in bed       Assessment/Plan:      * Acute paraplegia   sudden onset bilateral lower extremity paresis, urinary retention and paresthesia that began after heavy lifting on 9/18/23.   OSH MRI cervical and thoracic spine revealed stenosis and T9-L1 central cord edema with mild enhancement. MRI Brain w/wo 9/25: patchy periventricular white matter T2/flair hyperintensities demylination vs microvascular disease  MRI C/T/L spine 9/25: Abnormal cord edema from T9 to conus  MRI spine demyelinating/MRA: possible diffusion  restriction in distal thoracic spine/conus, non diagnostic MRA  --Cord findings suspicious for mass vs ischemia vs demyelinating disorder  -serum NMO negative  -Spinal angiogram on 10/2 neg for ischemia  -repeat MRI on 10/5 unremarkable for any new changes; s/p bedside LP by neurology largely unrevealing   - PT/OT  - high-dose steroid trial for 3 days per Neurology      Urinary retention  Related to suspected spinal cord injury.   CIC while in ICU, had high bladder residual 10/2 >800 on nursing assessment therefore barber catheter insertion ordered  -maintain barber catheter until more mobile and neurologic work up is completed        Colitis  Diagnosed at OSH based on upper quadrant abdominal pain and CT abdomen pelvis suggesting colitis. She was started on cipro and flagyl. Has not had any bowel movements since 9/18 despite aggressive bowel regimen at OSH.   CT a/p 9/26 - Large stool ball within the rectum with a suggestion of some rectal wall thickening.  Mild presacral soft tissue thickening/edema.  Findings raise the question of possible proctitis.  Improved symptoms w bowel movement  Off abx by the time she stepped down to me, has rectal tube in -> removed 10/3 since stool output was minimal  Bowel regimen stopped due to diarrhea        VTE Risk Mitigation (From admission, onward)         Ordered     enoxaparin injection 40 mg  Every 24 hours         10/05/23 0845     IP VTE HIGH RISK PATIENT  Once         09/25/23 1119     Place sequential compression device  Until discontinued         09/25/23 1119     Reason for No Pharmacological VTE Prophylaxis  Once        Question:  Reasons:  Answer:  Risk of Bleeding    09/25/23 1119                Discharge Planning   DEMETRICE: 10/10/2023     Code Status: Full Code   Is the patient medically ready for discharge?: No    Reason for patient still in hospital (select all that apply): Patient trending condition, Laboratory test, Treatment and Consult recommendations  Discharge  Plan A: Rehab   Discharge Delays: None known at this time              James Baker MD  Department of Hospital Medicine   [facility] - Neurosurgery [facility])

## 2023-10-07 LAB
ALBUMIN SERPL BCP-MCNC: 2.3 G/DL (ref 3.5–5.2)
ALP SERPL-CCNC: 178 U/L (ref 55–135)
ALT SERPL W/O P-5'-P-CCNC: 62 U/L (ref 10–44)
ANION GAP SERPL CALC-SCNC: 6 MMOL/L (ref 8–16)
AST SERPL-CCNC: 37 U/L (ref 10–40)
BASOPHILS # BLD AUTO: 0.01 K/UL (ref 0–0.2)
BASOPHILS NFR BLD: 0.4 % (ref 0–1.9)
BILIRUB SERPL-MCNC: 0.4 MG/DL (ref 0.1–1)
BUN SERPL-MCNC: 12 MG/DL (ref 8–23)
CALCIUM SERPL-MCNC: 9.4 MG/DL (ref 8.7–10.5)
CHLORIDE SERPL-SCNC: 103 MMOL/L (ref 95–110)
CO2 SERPL-SCNC: 24 MMOL/L (ref 23–29)
CREAT SERPL-MCNC: 0.6 MG/DL (ref 0.5–1.4)
DIFFERENTIAL METHOD: ABNORMAL
EOSINOPHIL # BLD AUTO: 0 K/UL (ref 0–0.5)
EOSINOPHIL NFR BLD: 0 % (ref 0–8)
ERYTHROCYTE [DISTWIDTH] IN BLOOD BY AUTOMATED COUNT: 12.2 % (ref 11.5–14.5)
EST. GFR  (NO RACE VARIABLE): >60 ML/MIN/1.73 M^2
GLUCOSE SERPL-MCNC: 267 MG/DL (ref 70–110)
HCT VFR BLD AUTO: 35.1 % (ref 37–48.5)
HGB BLD-MCNC: 11.4 G/DL (ref 12–16)
HSV1, PCR, CSF: NEGATIVE
HSV2, PCR, CSF: NEGATIVE
IMM GRANULOCYTES # BLD AUTO: 0.01 K/UL (ref 0–0.04)
IMM GRANULOCYTES NFR BLD AUTO: 0.4 % (ref 0–0.5)
LYMPHOCYTES # BLD AUTO: 0.6 K/UL (ref 1–4.8)
LYMPHOCYTES NFR BLD: 22.5 % (ref 18–48)
MCH RBC QN AUTO: 31.5 PG (ref 27–31)
MCHC RBC AUTO-ENTMCNC: 32.5 G/DL (ref 32–36)
MCV RBC AUTO: 97 FL (ref 82–98)
MONOCYTES # BLD AUTO: 0 K/UL (ref 0.3–1)
MONOCYTES NFR BLD: 1.4 % (ref 4–15)
NEUTROPHILS # BLD AUTO: 2.1 K/UL (ref 1.8–7.7)
NEUTROPHILS NFR BLD: 75.3 % (ref 38–73)
NRBC BLD-RTO: 0 /100 WBC
PHOSPHATE SERPL-MCNC: 3.4 MG/DL (ref 2.7–4.5)
PLATELET # BLD AUTO: 259 K/UL (ref 150–450)
PMV BLD AUTO: 9.4 FL (ref 9.2–12.9)
POCT GLUCOSE: 243 MG/DL (ref 70–110)
POCT GLUCOSE: 265 MG/DL (ref 70–110)
POCT GLUCOSE: 305 MG/DL (ref 70–110)
POCT GLUCOSE: 330 MG/DL (ref 70–110)
POTASSIUM SERPL-SCNC: 4.4 MMOL/L (ref 3.5–5.1)
PROT SERPL-MCNC: 6.5 G/DL (ref 6–8.4)
RBC # BLD AUTO: 3.62 M/UL (ref 4–5.4)
SODIUM SERPL-SCNC: 133 MMOL/L (ref 136–145)
WBC # BLD AUTO: 2.8 K/UL (ref 3.9–12.7)

## 2023-10-07 PROCEDURE — 99232 SBSQ HOSP IP/OBS MODERATE 35: CPT | Mod: ,,, | Performed by: PSYCHIATRY & NEUROLOGY

## 2023-10-07 PROCEDURE — 25000003 PHARM REV CODE 250: Performed by: NURSE PRACTITIONER

## 2023-10-07 PROCEDURE — 84100 ASSAY OF PHOSPHORUS: CPT | Performed by: NURSE PRACTITIONER

## 2023-10-07 PROCEDURE — 11000001 HC ACUTE MED/SURG PRIVATE ROOM

## 2023-10-07 PROCEDURE — 80053 COMPREHEN METABOLIC PANEL: CPT | Performed by: NURSE PRACTITIONER

## 2023-10-07 PROCEDURE — 63600175 PHARM REV CODE 636 W HCPCS: Performed by: HOSPITALIST

## 2023-10-07 PROCEDURE — 36415 COLL VENOUS BLD VENIPUNCTURE: CPT | Performed by: NURSE PRACTITIONER

## 2023-10-07 PROCEDURE — 99232 SBSQ HOSP IP/OBS MODERATE 35: CPT | Mod: ,,, | Performed by: HOSPITALIST

## 2023-10-07 PROCEDURE — 99232 PR SUBSEQUENT HOSPITAL CARE,LEVL II: ICD-10-PCS | Mod: ,,, | Performed by: HOSPITALIST

## 2023-10-07 PROCEDURE — 25000003 PHARM REV CODE 250

## 2023-10-07 PROCEDURE — 25000003 PHARM REV CODE 250: Performed by: HOSPITALIST

## 2023-10-07 PROCEDURE — 63600175 PHARM REV CODE 636 W HCPCS

## 2023-10-07 PROCEDURE — 85025 COMPLETE CBC W/AUTO DIFF WBC: CPT | Performed by: NURSE PRACTITIONER

## 2023-10-07 PROCEDURE — 99232 PR SUBSEQUENT HOSPITAL CARE,LEVL II: ICD-10-PCS | Mod: ,,, | Performed by: PSYCHIATRY & NEUROLOGY

## 2023-10-07 PROCEDURE — 25000003 PHARM REV CODE 250: Performed by: STUDENT IN AN ORGANIZED HEALTH CARE EDUCATION/TRAINING PROGRAM

## 2023-10-07 RX ORDER — IBUPROFEN 200 MG
16 TABLET ORAL
Status: DISCONTINUED | OUTPATIENT
Start: 2023-10-07 | End: 2023-10-11 | Stop reason: HOSPADM

## 2023-10-07 RX ORDER — IBUPROFEN 200 MG
24 TABLET ORAL
Status: DISCONTINUED | OUTPATIENT
Start: 2023-10-07 | End: 2023-10-11 | Stop reason: HOSPADM

## 2023-10-07 RX ORDER — GUAIFENESIN 100 MG/5ML
200 SOLUTION ORAL EVERY 6 HOURS PRN
Status: DISCONTINUED | OUTPATIENT
Start: 2023-10-07 | End: 2023-10-11

## 2023-10-07 RX ORDER — GLUCAGON 1 MG
1 KIT INJECTION
Status: DISCONTINUED | OUTPATIENT
Start: 2023-10-07 | End: 2023-10-11 | Stop reason: HOSPADM

## 2023-10-07 RX ORDER — INSULIN ASPART 100 [IU]/ML
0-5 INJECTION, SOLUTION INTRAVENOUS; SUBCUTANEOUS
Status: DISCONTINUED | OUTPATIENT
Start: 2023-10-07 | End: 2023-10-07

## 2023-10-07 RX ORDER — INSULIN ASPART 100 [IU]/ML
0-10 INJECTION, SOLUTION INTRAVENOUS; SUBCUTANEOUS
Status: DISCONTINUED | OUTPATIENT
Start: 2023-10-07 | End: 2023-10-11 | Stop reason: HOSPADM

## 2023-10-07 RX ORDER — PANTOPRAZOLE SODIUM 40 MG/1
40 TABLET, DELAYED RELEASE ORAL DAILY
Status: DISCONTINUED | OUTPATIENT
Start: 2023-10-07 | End: 2023-10-11 | Stop reason: HOSPADM

## 2023-10-07 RX ADMIN — GABAPENTIN 800 MG: 400 CAPSULE ORAL at 02:10

## 2023-10-07 RX ADMIN — GUAIFENESIN 200 MG: 200 SOLUTION ORAL at 10:10

## 2023-10-07 RX ADMIN — OXYCODONE HYDROCHLORIDE 10 MG: 10 TABLET ORAL at 11:10

## 2023-10-07 RX ADMIN — INSULIN ASPART 2 UNITS: 100 INJECTION, SOLUTION INTRAVENOUS; SUBCUTANEOUS at 12:10

## 2023-10-07 RX ADMIN — INSULIN ASPART 3 UNITS: 100 INJECTION, SOLUTION INTRAVENOUS; SUBCUTANEOUS at 08:10

## 2023-10-07 RX ADMIN — PANTOPRAZOLE SODIUM 40 MG: 40 TABLET, DELAYED RELEASE ORAL at 08:10

## 2023-10-07 RX ADMIN — FAMOTIDINE 20 MG: 20 TABLET ORAL at 08:10

## 2023-10-07 RX ADMIN — ENOXAPARIN SODIUM 40 MG: 40 INJECTION SUBCUTANEOUS at 04:10

## 2023-10-07 RX ADMIN — GABAPENTIN 800 MG: 400 CAPSULE ORAL at 08:10

## 2023-10-07 RX ADMIN — INSULIN ASPART 4 UNITS: 100 INJECTION, SOLUTION INTRAVENOUS; SUBCUTANEOUS at 10:10

## 2023-10-07 RX ADMIN — FAMOTIDINE 20 MG: 20 TABLET ORAL at 10:10

## 2023-10-07 RX ADMIN — INSULIN DETEMIR 5 UNITS: 100 INJECTION, SOLUTION SUBCUTANEOUS at 10:10

## 2023-10-07 RX ADMIN — ATORVASTATIN CALCIUM 20 MG: 20 TABLET, FILM COATED ORAL at 08:10

## 2023-10-07 RX ADMIN — INSULIN ASPART 4 UNITS: 100 INJECTION, SOLUTION INTRAVENOUS; SUBCUTANEOUS at 04:10

## 2023-10-07 RX ADMIN — GABAPENTIN 800 MG: 400 CAPSULE ORAL at 10:10

## 2023-10-07 RX ADMIN — METHYLPREDNISOLONE SODIUM SUCCINATE 1000 MG: 1 INJECTION, POWDER, LYOPHILIZED, FOR SOLUTION INTRAMUSCULAR; INTRAVENOUS at 08:10

## 2023-10-07 NOTE — ASSESSMENT & PLAN NOTE
67 y.o. female with HTN, GERD, depression and tobacco abuse presented 9/25/23 with BLE plegia that began abruptly after lifting her rollator with groceries on 9/18. Says over a matter of 3 hours, she was unable to move her legs at all and had abnormal sensation from waist down including lack of urge to urinate and have a bowel movement. Given rapid progression of symptoms concern for vascular etiology from injury. MRI showed anterior cord involvement in the distal T spine with owl eye sign c/w ischemia. Conus enhancement is unusual and raises the question of concurrent demyelinating polyneuropathy in light of recent colitis.     Interval update: No acute events overnight.  Patient tolerated 1st dose IV Solu-Medrol without incident.  She does note headache, and recommended taking some sort of anti headache NSAID that she is not allergic to.  No change in numbness or weakness. Sensation present at level around umbilicus.  Today will be IV solumedrol day 2 of 3.    Recommendations:  --F/U LP results at this time  --IV Solumedrol 1 gram qd x 3 days. Close monitoring in setting of possible prior allergy to steroids, consider anxiolytic medications.  --Message sent to Neuroimmunology clinic support staff to arrange follow-up with Dr. Ware in next few weeks to review pending serum and CSF studies   --PT/OT. Dispo: rehab   --pain control per primary team

## 2023-10-07 NOTE — PLAN OF CARE
Problem: Adult Inpatient Plan of Care  Goal: Absence of Hospital-Acquired Illness or Injury  Outcome: Met

## 2023-10-07 NOTE — ASSESSMENT & PLAN NOTE
67 y.o. female with HTN, GERD, depression and tobacco abuse presented 9/25/23 with BLE plegia that began abruptly after lifting her rollator with groceries on 9/18. Says over a matter of 3 hours, she was unable to move her legs at all and had abnormal sensation from waist down including lack of urge to urinate and have a bowel movement. Given rapid progression of symptoms concern for vascular etiology from injury. MRI showed anterior cord involvement in the distal T spine with owl eye sign c/w ischemia. Conus enhancement is unusual and raises the question of concurrent demyelinating polyneuropathy in light of recent colitis.     Interval update: No acute events overnight.  No significant change in neuro exam.  Patient notes maybe increased paresthesias in her right leg.  Sensation present above level around umbilicus.  Decision to increase duration of steroids to 5 days.  IV solumedrol day 3 of 5.  Patient interested in inpatient psychology consult.  She was previously on antidepressant and is unsure if she wants to restart at this time.    Recommendations:  --F/U LP results at this time  --IV Solumedrol 1 gram qd x 5 days.   --Message sent to Neuroimmunology clinic support staff to arrange follow-up with Dr. Ware in next few weeks to review pending serum and CSF studies   --PT/OT. Dispo: rehab   --pain control per primary team  --consider inpatient consult to Psychology  --consider restarting previous anti depressant if patient is willing to help with adjusting the new disability

## 2023-10-07 NOTE — SUBJECTIVE & OBJECTIVE
Subjective:     Interval History:   No acute events overnight.  Patient tolerated 1st dose IV Solu-Medrol without incident.  She does note headache, and recommended taking some sort of anti headache NSAID that she is not allergic to.  No change in numbness or weakness. Sensation present at level around umbilicus.  Today will be IV solumedrol day 2 of 3.    Current Facility-Administered Medications   Medication Dose Route Frequency Provider Last Rate Last Admin    atorvastatin tablet 20 mg  20 mg Oral Daily Jud Aguilar NP   20 mg at 10/07/23 0807    dextrose 10% bolus 125 mL 125 mL  12.5 g Intravenous PRN James Baker MD        dextrose 10% bolus 250 mL 250 mL  25 g Intravenous PRN James Baker MD        diazePAM tablet 5 mg  5 mg Oral Q4H PRN Jud Aguilar NP   5 mg at 10/04/23 0540    enoxaparin injection 40 mg  40 mg Subcutaneous Q24H (prophylaxis, 1700) James Baker MD   40 mg at 10/06/23 1700    famotidine tablet 20 mg  20 mg Oral BID Clemencia Guerrero NP   20 mg at 10/07/23 0807    gabapentin capsule 800 mg  800 mg Oral TID Jud Aguilar NP   800 mg at 10/07/23 1445    glucagon (human recombinant) injection 1 mg  1 mg Intramuscular PRN James Baker MD        glucose chewable tablet 16 g  16 g Oral PRN James Baker MD        glucose chewable tablet 24 g  24 g Oral PRN James Baker MD        insulin aspart U-100 pen 0-5 Units  0-5 Units Subcutaneous QID (AC + HS) PRN James Baker MD   2 Units at 10/07/23 1209    methylPREDNISolone sodium succinate (SOLU-MEDROL) 1,000 mg in dextrose 5 % (D5W) 100 mL IVPB  1,000 mg Intravenous Daily Tio Aguilar MD   Stopped at 10/07/23 0844    ondansetron injection 4 mg  4 mg Intravenous Q8H PRN Jud Aguilar NP   4 mg at 10/05/23 0114    oxyCODONE immediate release tablet 5 mg  5 mg Oral Q4H PRN Jud Aguilar NP   5 mg at 10/01/23 1632    oxyCODONE immediate release tablet Tab 10 mg  10 mg Oral Q4H PRN Jud Aguilar, NP    10 mg at 10/07/23 1101    pantoprazole EC tablet 40 mg  40 mg Oral Daily James Baker MD   40 mg at 10/07/23 0807    polyethylene glycol packet 17 g  17 g Oral Daily Clemencia Guerrero NAYAN   17 g at 10/06/23 0806    simethicone chewable tablet 80 mg  1 tablet Oral TID PRN Lauren JudNAYAN   80 mg at 10/03/23 1326    sodium chloride 0.9% flush 10 mL  10 mL Intravenous PRN Tenzin Verdin MD        sodium chloride 0.9% flush 10 mL  10 mL Intravenous PRN Ronnie Gardner MD         Review of Systems   Constitutional:  Positive for activity change and fatigue. Negative for fever.   HENT:  Negative for trouble swallowing and voice change.    Eyes:  Negative for visual disturbance.   Respiratory:  Negative for shortness of breath.    Cardiovascular:  Negative for chest pain and leg swelling.   Gastrointestinal:  Negative for nausea and vomiting.   Genitourinary:  Positive for difficulty urinating.   Musculoskeletal:  Positive for back pain and gait problem. Negative for neck stiffness.   Neurological:  Positive for weakness and numbness. Negative for tremors, seizures, syncope, facial asymmetry and speech difficulty.   Psychiatric/Behavioral:  Positive for dysphoric mood. Negative for confusion. The patient is nervous/anxious.      Objective:     Vital Signs (Most Recent):  Temp: 97.6 °F (36.4 °C) (10/07/23 1558)  Pulse: 80 (10/07/23 1558)  Resp: 18 (10/07/23 1558)  BP: 132/63 (10/07/23 1558)  SpO2: (!) 93 % (10/07/23 1558) Vital Signs (24h Range):  Temp:  [97.6 °F (36.4 °C)-98.7 °F (37.1 °C)] 97.6 °F (36.4 °C)  Pulse:  [70-89] 80  Resp:  [16-18] 18  SpO2:  [92 %-98 %] 93 %  BP: (132-170)/(63-84) 132/63     Weight: 91.6 kg (201 lb 15.1 oz)  Body mass index is 34.66 kg/m².     Physical Exam  Eyes:      Extraocular Movements: EOM normal.      Pupils: Pupils are equal, round, and reactive to light.   Neurological:      Mental Status: She is oriented to person, place, and time.      Coordination: Finger-Nose-Finger  Test normal.      Deep Tendon Reflexes:      Reflex Scores:       Bicep reflexes are 2+ on the right side and 2+ on the left side.       Brachioradialis reflexes are 2+ on the right side and 2+ on the left side.       Patellar reflexes are 0 on the right side and 0 on the left side.       Achilles reflexes are 0 on the right side and 0 on the left side.  Psychiatric:         Speech: Speech normal.          NEUROLOGICAL EXAMINATION:     MENTAL STATUS   Oriented to person, place, and time.   Follows 2 step commands.   Attention: normal. Concentration: normal.   Speech: speech is normal   Level of consciousness: alert  Knowledge: good.   Normal comprehension.     CRANIAL NERVES     CN III, IV, VI   Pupils are equal, round, and reactive to light.  Extraocular motions are normal.   Nystagmus: none   Diplopia: none  Ophthalmoparesis: none    CN V   Facial sensation intact.     CN VII   Facial expression full, symmetric.     CN VIII   Hearing: intact    CN IX, X   Palate: symmetric    CN XI   CN XI normal.     CN XII   CN XII normal.     MOTOR EXAM   Muscle bulk: normal  Right arm pronator drift: absent  Left arm pronator drift: absent    Strength   Right deltoid: 5/5  Left deltoid: 5/5  Right biceps: 5/5  Left biceps: 5/5  Right triceps: 5/5  Left triceps: 5/5  Right interossei: 5/5  Left interossei: 5/5       BLE plegia  Unable to wiggle toes or move legs off mattress     REFLEXES     Reflexes   Right brachioradialis: 2+  Left brachioradialis: 2+  Right biceps: 2+  Left biceps: 2+  Right patellar: 0  Left patellar: 0  Right achilles: 0  Left achilles: 0    SENSORY EXAM   Right leg light touch: decreased from knee  Left leg light touch: decreased from knee  Right leg vibration: decreased from knee  Left leg vibration: decreased from knee    GAIT AND COORDINATION     Gait  Gait: (deferred)     Coordination   Finger to nose coordination: normal    Tremor   Resting tremor: absent    Significant Labs: All pertinent lab  results from the past 24 hours have been reviewed.    CSF: Protein 41, glucose 94, cell count clear 1 WBC, 0 % lymph    Pending:  CSF: NMO, MS, HSV, VZV, Enterovirus, WNV, VDRL, paraneo, autoimmune panel, ACE  Serum: CNS demyelinating disease panel, oligoclonal bands, ACE, paraneoplastic and autoimmune panel    Significant Imaging: I have reviewed all pertinent imaging results/findings within the past 24 hours.    MRI C/T/L spine W WO contrast (10/5/23):  Persistent abnormal central cord edema extending from T9 through the tip of the conus with associated increased patchy enhancement and interval development of cauda equina nerve root enhancement, findings that favor sequela of transverse myelitis/demyelinating disease or a post infectious process.  A subacute infarct is felt to be less likely given aforementioned nerve root enhancement.

## 2023-10-07 NOTE — PROGRESS NOTES
[facility] - Neurosurgery ([facility])  Neurology  Progress Note    Patient Name: Rhina Forrest  MRN: [id number]  Admission Date: 9/25/2023  Hospital Length of Stay: 12 days  Code Status: Full Code   Attending Provider: James Baker MD  Primary Care Physician: Cal Alvarado, ROCIOP-C   Principal Problem:Acute paraplegia    HPI:   67 y.o. female with HTN, GERD, depression and tobacco abuse presented 9/25/23 with BLE plegia that began abruptly after lifting her rollator with groceries. Pt reports using a rollator x 3 years after a L knee injury (ACL, meniscus). She walked to [employer] to get groceries and tried to lift her walker and felt a pop. She knew she injured something in her back. She was able to make it home and walk to the bathroom, felt weak getting off the toilet, but was able to make it to her couch. Once lying down on the sofa, she was unable to get back up and called 911. Says over a matter of 3 hours, she was unable to move her legs at all and had abnormal sensation from waist down including lack of urge to urinate and have a bowel movement. She was brought to [facility] and underwent MRI neuro-axis showing T9-L1 central cord edema. CT Ab/pelvis also showed colitis and she was treated with cipro and flagyl. She was transferred to [facility] for higher level of care/NSGY evaluation. Underwent repeating imaging of neuro-axis and spinal angiogram (10/2) that was unremarkable. Neurology consulted 10/4 for evaluation of transverse myelitis and consideration for LP. Pt still with ongoing BLE plegia, urinary retention s/p barber catheter.           Overview/Hospital Course:  No notes on file        Subjective:     Interval History:   No acute events overnight.  Patient tolerated 1st dose IV Solu-Medrol without incident.  She does note headache, and recommended taking some sort of anti headache NSAID that she is not allergic to.  No change in numbness or weakness. Sensation present at level around  umbilicus.  Today will be IV solumedrol day 2 of 3.    Current Facility-Administered Medications   Medication Dose Route Frequency Provider Last Rate Last Admin    atorvastatin tablet 20 mg  20 mg Oral Daily Jud Aguilar, NP   20 mg at 10/07/23 0807    dextrose 10% bolus 125 mL 125 mL  12.5 g Intravenous PRN James Baker MD        dextrose 10% bolus 250 mL 250 mL  25 g Intravenous PRN James Baker MD        diazePAM tablet 5 mg  5 mg Oral Q4H PRN Jud Aguilar, NP   5 mg at 10/04/23 0540    enoxaparin injection 40 mg  40 mg Subcutaneous Q24H (prophylaxis, 1700) James Baker MD   40 mg at 10/06/23 1700    famotidine tablet 20 mg  20 mg Oral BID Clemencia Guerrero NP   20 mg at 10/07/23 0807    gabapentin capsule 800 mg  800 mg Oral TID Jud Aguilar, NP   800 mg at 10/07/23 1445    glucagon (human recombinant) injection 1 mg  1 mg Intramuscular PRN James Baker MD        glucose chewable tablet 16 g  16 g Oral PRN James Baker MD        glucose chewable tablet 24 g  24 g Oral PRN James Baker MD        insulin aspart U-100 pen 0-5 Units  0-5 Units Subcutaneous QID (AC + HS) PRN James Baker MD   2 Units at 10/07/23 1209    methylPREDNISolone sodium succinate (SOLU-MEDROL) 1,000 mg in dextrose 5 % (D5W) 100 mL IVPB  1,000 mg Intravenous Daily Tio Aguilar MD   Stopped at 10/07/23 0844    ondansetron injection 4 mg  4 mg Intravenous Q8H PRN Jud Aguilar, NP   4 mg at 10/05/23 0114    oxyCODONE immediate release tablet 5 mg  5 mg Oral Q4H PRN Jud Aguilar, NP   5 mg at 10/01/23 1632    oxyCODONE immediate release tablet Tab 10 mg  10 mg Oral Q4H PRN Jud Aguilar, NP   10 mg at 10/07/23 1101    pantoprazole EC tablet 40 mg  40 mg Oral Daily James Baker MD   40 mg at 10/07/23 0807    polyethylene glycol packet 17 g  17 g Oral Daily Clemencia Guerrero NP   17 g at 10/06/23 0806    simethicone chewable tablet 80 mg  1 tablet Oral TID PRN Lauren  Jud, NP   80 mg at 10/03/23 1326    sodium chloride 0.9% flush 10 mL  10 mL Intravenous PRN Tenzin Verdin MD        sodium chloride 0.9% flush 10 mL  10 mL Intravenous PRN Ronnie Gardner MD         Review of Systems   Constitutional:  Positive for activity change and fatigue. Negative for fever.   HENT:  Negative for trouble swallowing and voice change.    Eyes:  Negative for visual disturbance.   Respiratory:  Negative for shortness of breath.    Cardiovascular:  Negative for chest pain and leg swelling.   Gastrointestinal:  Negative for nausea and vomiting.   Genitourinary:  Positive for difficulty urinating.   Musculoskeletal:  Positive for back pain and gait problem. Negative for neck stiffness.   Neurological:  Positive for weakness and numbness. Negative for tremors, seizures, syncope, facial asymmetry and speech difficulty.   Psychiatric/Behavioral:  Positive for dysphoric mood. Negative for confusion. The patient is nervous/anxious.      Objective:     Vital Signs (Most Recent):  Temp: 97.6 °F (36.4 °C) (10/07/23 1558)  Pulse: 80 (10/07/23 1558)  Resp: 18 (10/07/23 1558)  BP: 132/63 (10/07/23 1558)  SpO2: (!) 93 % (10/07/23 1558) Vital Signs (24h Range):  Temp:  [97.6 °F (36.4 °C)-98.7 °F (37.1 °C)] 97.6 °F (36.4 °C)  Pulse:  [70-89] 80  Resp:  [16-18] 18  SpO2:  [92 %-98 %] 93 %  BP: (132-170)/(63-84) 132/63     Weight: 91.6 kg (201 lb 15.1 oz)  Body mass index is 34.66 kg/m².     Physical Exam  Eyes:      Extraocular Movements: EOM normal.      Pupils: Pupils are equal, round, and reactive to light.   Neurological:      Mental Status: She is oriented to person, place, and time.      Coordination: Finger-Nose-Finger Test normal.      Deep Tendon Reflexes:      Reflex Scores:       Bicep reflexes are 2+ on the right side and 2+ on the left side.       Brachioradialis reflexes are 2+ on the right side and 2+ on the left side.       Patellar reflexes are 0 on the right side and 0 on the left side.        Achilles reflexes are 0 on the right side and 0 on the left side.  Psychiatric:         Speech: Speech normal.          NEUROLOGICAL EXAMINATION:     MENTAL STATUS   Oriented to person, place, and time.   Follows 2 step commands.   Attention: normal. Concentration: normal.   Speech: speech is normal   Level of consciousness: alert  Knowledge: good.   Normal comprehension.     CRANIAL NERVES     CN III, IV, VI   Pupils are equal, round, and reactive to light.  Extraocular motions are normal.   Nystagmus: none   Diplopia: none  Ophthalmoparesis: none    CN V   Facial sensation intact.     CN VII   Facial expression full, symmetric.     CN VIII   Hearing: intact    CN IX, X   Palate: symmetric    CN XI   CN XI normal.     CN XII   CN XII normal.     MOTOR EXAM   Muscle bulk: normal  Right arm pronator drift: absent  Left arm pronator drift: absent    Strength   Right deltoid: 5/5  Left deltoid: 5/5  Right biceps: 5/5  Left biceps: 5/5  Right triceps: 5/5  Left triceps: 5/5  Right interossei: 5/5  Left interossei: 5/5       BLE plegia  Unable to wiggle toes or move legs off mattress     REFLEXES     Reflexes   Right brachioradialis: 2+  Left brachioradialis: 2+  Right biceps: 2+  Left biceps: 2+  Right patellar: 0  Left patellar: 0  Right achilles: 0  Left achilles: 0    SENSORY EXAM   Right leg light touch: decreased from knee  Left leg light touch: decreased from knee  Right leg vibration: decreased from knee  Left leg vibration: decreased from knee    GAIT AND COORDINATION     Gait  Gait: (deferred)     Coordination   Finger to nose coordination: normal    Tremor   Resting tremor: absent    Significant Labs: All pertinent lab results from the past 24 hours have been reviewed.    CSF: Protein 41, glucose 94, cell count clear 1 WBC, 0 % lymph    Pending:  CSF: NMO, MS, HSV, VZV, Enterovirus, WNV, VDRL, paraneo, autoimmune panel, ACE  Serum: CNS demyelinating disease panel, oligoclonal bands, ACE,  paraneoplastic and autoimmune panel    Significant Imaging: I have reviewed all pertinent imaging results/findings within the past 24 hours.    MRI C/T/L spine W WO contrast (10/5/23):  Persistent abnormal central cord edema extending from T9 through the tip of the conus with associated increased patchy enhancement and interval development of cauda equina nerve root enhancement, findings that favor sequela of transverse myelitis/demyelinating disease or a post infectious process.  A subacute infarct is felt to be less likely given aforementioned nerve root enhancement.    Assessment and Plan:     * Acute paraplegia  67 y.o. female with HTN, GERD, depression and tobacco abuse presented 9/25/23 with BLE plegia that began abruptly after lifting her rollator with groceries on 9/18. Says over a matter of 3 hours, she was unable to move her legs at all and had abnormal sensation from waist down including lack of urge to urinate and have a bowel movement. Given rapid progression of symptoms concern for vascular etiology from injury. MRI showed anterior cord involvement in the distal T spine with owl eye sign c/w ischemia. Conus enhancement is unusual and raises the question of concurrent demyelinating polyneuropathy in light of recent colitis.     Interval update: No acute events overnight.  Patient tolerated 1st dose IV Solu-Medrol without incident.  She does note headache, and recommended taking some sort of anti headache NSAID that she is not allergic to.  No change in numbness or weakness. Sensation present at level around umbilicus.  Today will be IV solumedrol day 2 of 3.    Recommendations:  --F/U LP results at this time  --IV Solumedrol 1 gram qd x 3 days. Close monitoring in setting of possible prior allergy to steroids, consider anxiolytic medications.  --Message sent to Neuroimmunology clinic support staff to arrange follow-up with Dr. Ware in next few weeks to review pending serum and CSF studies   --PT/OT.  Dispo: rehab   --pain control per primary team    Paraplegia  67 y.o. female with HTN, GERD, depression and tobacco abuse presented 9/25/23 with BLE plegia that began abruptly after lifting her rollator with groceries on 9/18. Says over a matter of 3 hours, she was unable to move her legs at all and had abnormal sensation from waist down including lack of urge to urinate and have a bowel movement. Given rapid progression of symptoms concern for vascular etiology from injury. MRI showed anterior cord involvement in the distal T spine with owl eye sign c/w ischemia. Conus enhancement is unusual and raises the question of concurrent demyelinating polyneuropathy in light of recent colitis.     10/5--repeat MRI overnight showed persistent abnormal central cord edema extending from T9 through the tip of the conus with associated increased patchy enhancement and interval development of cauda equina nerve root enhancement. No change in clinical exam    10/6--LP done, CSF clear will cell count 1 WBC, no RBCs. Glucose elevated, protein mildly elevated. Rest of labs currently pending at this time    Recommendations:  --F/U LP results at this time  --Ordered IV Solumedrol 1 gram qd x 3 days. Close monitoring in setting of possible prior allergy to steroids, consider anxiolytic medications.  --Message sent to Neuroimmunology clinic support staff to arrange follow-up with Dr. Ware in next few weeks to review pending serum and CSF studies   --PT/OT. Likely will need long term rehab   --pain control per primary team        VTE Risk Mitigation (From admission, onward)         Ordered     enoxaparin injection 40 mg  Every 24 hours         10/05/23 0845     IP VTE HIGH RISK PATIENT  Once         09/25/23 1119     Place sequential compression device  Until discontinued         09/25/23 1119     Reason for No Pharmacological VTE Prophylaxis  Once        Question:  Reasons:  Answer:  Risk of Bleeding    09/25/23 1119                 Tio Aguilar MD  Neurology  [facility] - Neurosurgery ([facility])

## 2023-10-07 NOTE — ASSESSMENT & PLAN NOTE
sudden onset bilateral lower extremity paresis, urinary retention and paresthesia that began after heavy lifting on 9/18/23.   OSH MRI cervical and thoracic spine revealed stenosis and T9-L1 central cord edema with mild enhancement. MRI Brain w/wo 9/25: patchy periventricular white matter T2/flair hyperintensities demylination vs microvascular disease  MRI C/T/L spine 9/25: Abnormal cord edema from T9 to conus  MRI spine demyelinating/MRA: possible diffusion restriction in distal thoracic spine/conus, non diagnostic MRA  --Cord findings suspicious for mass vs ischemia vs demyelinating disorder  -serum NMO negative  -Spinal angiogram on 10/2 neg for ischemia  -repeat MRI on 10/5 unremarkable for any new changes; s/p bedside LP by neurology largely unrevealing   - PT/OT  - high-dose steroid trial for 3 days per Neurology started on 10/6/23

## 2023-10-07 NOTE — PROGRESS NOTES
[facility] - Neurosurgery ([facility])  [facility] Medicine  Progress Note    Patient Name: Rhina Forrest  MRN: [id number]  Patient Class: IP- Inpatient   Admission Date: 9/25/2023  Length of Stay: 12 days  Attending Physician: James Baker MD  Primary Care Provider: Cal Alvarado FNP-C        Subjective:     Principal Problem:Acute paraplegia        HPI:  Copied from Neuro-crit care team:  66 yo F PMHx GERD, HTN, smoker, depression presenting with bilateral lower extremity plegia that began after heavy lifting on 9/18/23. Pt walks with a walker at baseline and lives at home alone. On 9/18 she was walking home with groceries on her walker and lifted the walker over a curb and hurt her back. Pt was able to walk home and put her groceries away. Once she sat on the couch, she was unable to get back up as her legs stopped moving. She also had decreased sensation from T12 down. Pt called 911 and was taken via EMS to [facility]. MRI cervical and thoracic spine revealed stenosis and T9-L1 cord edema with mild enhancement suspicious for evolving cord infarct vs demyelinating process per radiology report (no imaging was sent with patient). Pt was found to have NSTEMI with troponin 1.1 and was started on ASA and plavix. Pt also c/o bl upper quadrant abdominal pain and CT abdomen pelvis revealed colitis. She was started on cipro and flagyl. Pt has had a barber since 9/18 and has not had any bowel movements since 9/18 despite aggressive bowel regimen at OSH. Transferred to [facility] for neurosurgical evaluation and further workup. Pt currently c/o severe abdominal pain, n/v, severe back pain, BUE rash x 1 month, BLE plegia and T12 sensory level.          Overview/Hospital Course:  ICU course - remarkable for bowel decompression with laxatives - rectal tube inserted, improved abdominal pain, discontinuation of all antibiotics, pain control issues (better on current rx). Stepped down to  10/1/23. On 10/2/23,  IR performed spinal angiogram - came back normal. NSGy recommended Neurology consult for LP and signed off. Hernandez catheter inserted the same day for urinary retention.  Repeat MRI is negative for any new or acute findings.  Neurology performed a bedside LP on 10/05/2023 with no significantly elevated WBCs. Hi dose steroids started on 10/6/23.               Interval History:  pt started on solumedrol yesterday; tolerating well. However no improvement in leg movement; no cp, no SOB.     Review of Systems  Objective:     Vital Signs (Most Recent):  Temp: 97.8 °F (36.6 °C) (10/07/23 0751)  Pulse: 73 (10/07/23 0751)  Resp: 18 (10/07/23 0751)  BP: (!) 170/77 (10/07/23 0751)  SpO2: 98 % (10/07/23 0751) Vital Signs (24h Range):  Temp:  [97.3 °F (36.3 °C)-98.8 °F (37.1 °C)] 97.8 °F (36.6 °C)  Pulse:  [70-77] 73  Resp:  [16-18] 18  SpO2:  [92 %-98 %] 98 %  BP: (128-170)/(66-77) 170/77     Weight: 91.6 kg (201 lb 15.1 oz)  Body mass index is 34.66 kg/m².    Intake/Output Summary (Last 24 hours) at 10/7/2023 1024  Last data filed at 10/6/2023 2000  Gross per 24 hour   Intake 99.42 ml   Output 1725 ml   Net -1625.58 ml         Physical Exam      Clear lungs bilaterally, unlabored breathing, on room air, no cyanosis   Heart sounds indicate a regular rate and rhythm  Awake alert, no acute distress   No facial droop, no slurred speech   5/5 proximal upper extremity strength bilaterally including fist   0/5 hip flexor strength  No obvious lower extremity edema   Sitting up in bed   Does have some sensation 2 finger touch/pressure over the lower extremities bilaterally         Assessment/Plan:      * Acute paraplegia   sudden onset bilateral lower extremity paresis, urinary retention and paresthesia that began after heavy lifting on 9/18/23.   OSH MRI cervical and thoracic spine revealed stenosis and T9-L1 central cord edema with mild enhancement. MRI Brain w/wo 9/25: patchy periventricular white matter T2/flair hyperintensities  demylination vs microvascular disease  MRI C/T/L spine 9/25: Abnormal cord edema from T9 to conus  MRI spine demyelinating/MRA: possible diffusion restriction in distal thoracic spine/conus, non diagnostic MRA  --Cord findings suspicious for mass vs ischemia vs demyelinating disorder  -serum NMO negative  -Spinal angiogram on 10/2 neg for ischemia  -repeat MRI on 10/5 unremarkable for any new changes; s/p bedside LP by neurology largely unrevealing   - PT/OT  - high-dose steroid trial for 3 days per Neurology started on 10/6/23      Urinary retention  Related to suspected spinal cord injury.   CIC while in ICU, had high bladder residual 10/2 >800 on nursing assessment therefore barber catheter insertion ordered  -maintain barber catheter until more mobile and neurologic work up is completed        Colitis  Diagnosed at OSH based on upper quadrant abdominal pain and CT abdomen pelvis suggesting colitis. She was started on cipro and flagyl. Has not had any bowel movements since 9/18 despite aggressive bowel regimen at OSH.   CT a/p 9/26 - Large stool ball within the rectum with a suggestion of some rectal wall thickening.  Mild presacral soft tissue thickening/edema.  Findings raise the question of possible proctitis.  Improved symptoms w bowel movement  Off abx by the time she stepped down to me, has rectal tube in -> removed 10/3 since stool output was minimal  Bowel regimen stopped due to diarrhea        VTE Risk Mitigation (From admission, onward)         Ordered     enoxaparin injection 40 mg  Every 24 hours         10/05/23 0845     IP VTE HIGH RISK PATIENT  Once         09/25/23 1119     Place sequential compression device  Until discontinued         09/25/23 1119     Reason for No Pharmacological VTE Prophylaxis  Once        Question:  Reasons:  Answer:  Risk of Bleeding    09/25/23 1119                Discharge Planning   DEMETRICE: 10/10/2023     Code Status: Full Code   Is the patient medically ready for  discharge?: No    Reason for patient still in hospital (select all that apply): Patient trending condition, Treatment and Consult recommendations  Discharge Plan A: Rehab   Discharge Delays: None known at this time              James Baker MD  Department of Hospital Medicine   [facility] - Neurosurgery ([facility])

## 2023-10-07 NOTE — SUBJECTIVE & OBJECTIVE
Interval History:  pt started on solumedrol yesterday; tolerating well. However no improvement in leg movement; no cp, no SOB.     Review of Systems  Objective:     Vital Signs (Most Recent):  Temp: 97.8 °F (36.6 °C) (10/07/23 0751)  Pulse: 73 (10/07/23 0751)  Resp: 18 (10/07/23 0751)  BP: (!) 170/77 (10/07/23 0751)  SpO2: 98 % (10/07/23 0751) Vital Signs (24h Range):  Temp:  [97.3 °F (36.3 °C)-98.8 °F (37.1 °C)] 97.8 °F (36.6 °C)  Pulse:  [70-77] 73  Resp:  [16-18] 18  SpO2:  [92 %-98 %] 98 %  BP: (128-170)/(66-77) 170/77     Weight: 91.6 kg (201 lb 15.1 oz)  Body mass index is 34.66 kg/m².    Intake/Output Summary (Last 24 hours) at 10/7/2023 1024  Last data filed at 10/6/2023 2000  Gross per 24 hour   Intake 99.42 ml   Output 1725 ml   Net -1625.58 ml         Physical Exam      Clear lungs bilaterally, unlabored breathing, on room air, no cyanosis   Heart sounds indicate a regular rate and rhythm  Awake alert, no acute distress   No facial droop, no slurred speech   5/5 proximal upper extremity strength bilaterally including fist   0/5 hip flexor strength  No obvious lower extremity edema   Sitting up in bed   Does have some sensation 2 finger touch/pressure over the lower extremities bilaterally

## 2023-10-08 PROBLEM — R05.9 COUGH: Status: ACTIVE | Noted: 2023-10-08

## 2023-10-08 LAB
ALBUMIN SERPL BCP-MCNC: 2.4 G/DL (ref 3.5–5.2)
ALP SERPL-CCNC: 182 U/L (ref 55–135)
ALT SERPL W/O P-5'-P-CCNC: 62 U/L (ref 10–44)
ANION GAP SERPL CALC-SCNC: 7 MMOL/L (ref 8–16)
AST SERPL-CCNC: 29 U/L (ref 10–40)
BASOPHILS # BLD AUTO: 0.01 K/UL (ref 0–0.2)
BASOPHILS NFR BLD: 0.2 % (ref 0–1.9)
BILIRUB SERPL-MCNC: 0.3 MG/DL (ref 0.1–1)
BUN SERPL-MCNC: 17 MG/DL (ref 8–23)
CALCIUM SERPL-MCNC: 9.6 MG/DL (ref 8.7–10.5)
CHLORIDE SERPL-SCNC: 107 MMOL/L (ref 95–110)
CO2 SERPL-SCNC: 23 MMOL/L (ref 23–29)
CREAT SERPL-MCNC: 0.6 MG/DL (ref 0.5–1.4)
DIFFERENTIAL METHOD: ABNORMAL
EOSINOPHIL # BLD AUTO: 0 K/UL (ref 0–0.5)
EOSINOPHIL NFR BLD: 0 % (ref 0–8)
ERYTHROCYTE [DISTWIDTH] IN BLOOD BY AUTOMATED COUNT: 12.5 % (ref 11.5–14.5)
EST. GFR  (NO RACE VARIABLE): >60 ML/MIN/1.73 M^2
EV RNA SPEC QL NAA+PROBE: NEGATIVE
GLUCOSE SERPL-MCNC: 254 MG/DL (ref 70–110)
HCT VFR BLD AUTO: 32.7 % (ref 37–48.5)
HGB BLD-MCNC: 11 G/DL (ref 12–16)
IMM GRANULOCYTES # BLD AUTO: 0.03 K/UL (ref 0–0.04)
IMM GRANULOCYTES NFR BLD AUTO: 0.6 % (ref 0–0.5)
LYMPHOCYTES # BLD AUTO: 0.7 K/UL (ref 1–4.8)
LYMPHOCYTES NFR BLD: 13.8 % (ref 18–48)
MCH RBC QN AUTO: 31.2 PG (ref 27–31)
MCHC RBC AUTO-ENTMCNC: 33.6 G/DL (ref 32–36)
MCV RBC AUTO: 93 FL (ref 82–98)
MONOCYTES # BLD AUTO: 0.4 K/UL (ref 0.3–1)
MONOCYTES NFR BLD: 8.6 % (ref 4–15)
NEUTROPHILS # BLD AUTO: 3.7 K/UL (ref 1.8–7.7)
NEUTROPHILS NFR BLD: 76.8 % (ref 38–73)
NRBC BLD-RTO: 0 /100 WBC
PHOSPHATE SERPL-MCNC: 2.8 MG/DL (ref 2.7–4.5)
PLATELET # BLD AUTO: 257 K/UL (ref 150–450)
PMV BLD AUTO: 9.3 FL (ref 9.2–12.9)
POCT GLUCOSE: 152 MG/DL (ref 70–110)
POCT GLUCOSE: 160 MG/DL (ref 70–110)
POCT GLUCOSE: 255 MG/DL (ref 70–110)
POCT GLUCOSE: 312 MG/DL (ref 70–110)
POTASSIUM SERPL-SCNC: 3.8 MMOL/L (ref 3.5–5.1)
PROT SERPL-MCNC: 6.4 G/DL (ref 6–8.4)
RBC # BLD AUTO: 3.53 M/UL (ref 4–5.4)
SODIUM SERPL-SCNC: 137 MMOL/L (ref 136–145)
SPECIMEN SOURCE: NORMAL
T GONDII DNA CSF QL NAA+PROBE: NEGATIVE
VARICELLA ZOSTER BY PCR RESULT: NEGATIVE
WBC # BLD AUTO: 4.78 K/UL (ref 3.9–12.7)

## 2023-10-08 PROCEDURE — 85025 COMPLETE CBC W/AUTO DIFF WBC: CPT | Performed by: NURSE PRACTITIONER

## 2023-10-08 PROCEDURE — 63600175 PHARM REV CODE 636 W HCPCS: Performed by: HOSPITALIST

## 2023-10-08 PROCEDURE — 36415 COLL VENOUS BLD VENIPUNCTURE: CPT | Performed by: NURSE PRACTITIONER

## 2023-10-08 PROCEDURE — 11000001 HC ACUTE MED/SURG PRIVATE ROOM

## 2023-10-08 PROCEDURE — 63600175 PHARM REV CODE 636 W HCPCS: Performed by: NURSE PRACTITIONER

## 2023-10-08 PROCEDURE — 99232 PR SUBSEQUENT HOSPITAL CARE,LEVL II: ICD-10-PCS | Mod: ,,, | Performed by: PSYCHIATRY & NEUROLOGY

## 2023-10-08 PROCEDURE — 25000003 PHARM REV CODE 250: Performed by: HOSPITALIST

## 2023-10-08 PROCEDURE — 99232 SBSQ HOSP IP/OBS MODERATE 35: CPT | Mod: ,,, | Performed by: PSYCHIATRY & NEUROLOGY

## 2023-10-08 PROCEDURE — 25000003 PHARM REV CODE 250

## 2023-10-08 PROCEDURE — 84100 ASSAY OF PHOSPHORUS: CPT | Performed by: NURSE PRACTITIONER

## 2023-10-08 PROCEDURE — 63600175 PHARM REV CODE 636 W HCPCS

## 2023-10-08 PROCEDURE — 25000003 PHARM REV CODE 250: Performed by: STUDENT IN AN ORGANIZED HEALTH CARE EDUCATION/TRAINING PROGRAM

## 2023-10-08 PROCEDURE — 80053 COMPREHEN METABOLIC PANEL: CPT | Performed by: NURSE PRACTITIONER

## 2023-10-08 PROCEDURE — 99232 PR SUBSEQUENT HOSPITAL CARE,LEVL II: ICD-10-PCS | Mod: ,,, | Performed by: HOSPITALIST

## 2023-10-08 PROCEDURE — 99232 SBSQ HOSP IP/OBS MODERATE 35: CPT | Mod: ,,, | Performed by: HOSPITALIST

## 2023-10-08 PROCEDURE — 25000003 PHARM REV CODE 250: Performed by: NURSE PRACTITIONER

## 2023-10-08 PROCEDURE — 25000242 PHARM REV CODE 250 ALT 637 W/ HCPCS: Performed by: HOSPITALIST

## 2023-10-08 RX ORDER — FLUTICASONE PROPIONATE 50 MCG
2 SPRAY, SUSPENSION (ML) NASAL DAILY
Status: DISCONTINUED | OUTPATIENT
Start: 2023-10-08 | End: 2023-10-11 | Stop reason: HOSPADM

## 2023-10-08 RX ADMIN — GUAIFENESIN 200 MG: 200 SOLUTION ORAL at 05:10

## 2023-10-08 RX ADMIN — GABAPENTIN 800 MG: 400 CAPSULE ORAL at 02:10

## 2023-10-08 RX ADMIN — METHYLPREDNISOLONE SODIUM SUCCINATE 1000 MG: 1 INJECTION, POWDER, LYOPHILIZED, FOR SOLUTION INTRAMUSCULAR; INTRAVENOUS at 10:10

## 2023-10-08 RX ADMIN — INSULIN ASPART 6 UNITS: 100 INJECTION, SOLUTION INTRAVENOUS; SUBCUTANEOUS at 04:10

## 2023-10-08 RX ADMIN — OXYCODONE HYDROCHLORIDE 5 MG: 5 TABLET ORAL at 05:10

## 2023-10-08 RX ADMIN — FAMOTIDINE 20 MG: 20 TABLET ORAL at 09:10

## 2023-10-08 RX ADMIN — PANTOPRAZOLE SODIUM 40 MG: 40 TABLET, DELAYED RELEASE ORAL at 09:10

## 2023-10-08 RX ADMIN — FLUTICASONE PROPIONATE 100 MCG: 50 SPRAY, METERED NASAL at 02:10

## 2023-10-08 RX ADMIN — ATORVASTATIN CALCIUM 20 MG: 20 TABLET, FILM COATED ORAL at 09:10

## 2023-10-08 RX ADMIN — INSULIN ASPART 2 UNITS: 100 INJECTION, SOLUTION INTRAVENOUS; SUBCUTANEOUS at 09:10

## 2023-10-08 RX ADMIN — ONDANSETRON 4 MG: 2 INJECTION INTRAMUSCULAR; INTRAVENOUS at 05:10

## 2023-10-08 RX ADMIN — ENOXAPARIN SODIUM 40 MG: 40 INJECTION SUBCUTANEOUS at 04:10

## 2023-10-08 RX ADMIN — INSULIN ASPART 8 UNITS: 100 INJECTION, SOLUTION INTRAVENOUS; SUBCUTANEOUS at 09:10

## 2023-10-08 RX ADMIN — GABAPENTIN 800 MG: 400 CAPSULE ORAL at 09:10

## 2023-10-08 RX ADMIN — OXYCODONE HYDROCHLORIDE 10 MG: 10 TABLET ORAL at 11:10

## 2023-10-08 NOTE — SUBJECTIVE & OBJECTIVE
Subjective:     Interval History:   No acute events overnight.  No significant change in neuro exam.  Patient notes maybe increased paresthesias in her right leg.  Sensation present above level around umbilicus.  Decision to increase duration of steroids to 5 days.  IV solumedrol day 3 of 5.  Patient interested in inpatient psychology consult.  She was previously on antidepressant and is unsure if she wants to restart at this time.    Current Facility-Administered Medications   Medication Dose Route Frequency Provider Last Rate Last Admin    atorvastatin tablet 20 mg  20 mg Oral Daily Jud Aguilar NP   20 mg at 10/08/23 0918    dextrose 10% bolus 125 mL 125 mL  12.5 g Intravenous PRN James Baker MD        dextrose 10% bolus 250 mL 250 mL  25 g Intravenous PRN James Baker MD        diazePAM tablet 5 mg  5 mg Oral Q4H PRN Jud Aguilar NP   5 mg at 10/04/23 0540    enoxaparin injection 40 mg  40 mg Subcutaneous Q24H (prophylaxis, 1700) James Baker MD   40 mg at 10/08/23 1649    famotidine tablet 20 mg  20 mg Oral BID Clemencia Guerrero NP   20 mg at 10/08/23 0918    fluticasone propionate 50 mcg/actuation nasal spray 100 mcg  2 spray Each Nostril Daily James Baker MD   100 mcg at 10/08/23 1444    gabapentin capsule 800 mg  800 mg Oral TID Jud Aguilar NP   800 mg at 10/08/23 1444    glucagon (human recombinant) injection 1 mg  1 mg Intramuscular PRN James Baker MD        glucose chewable tablet 16 g  16 g Oral PRN James Baker MD        glucose chewable tablet 24 g  24 g Oral PRN James Baker MD        guaiFENesin 100 mg/5 ml syrup 200 mg  200 mg Oral Q6H PRN Tenzin Ruiz MD   200 mg at 10/08/23 0512    insulin aspart U-100 pen 0-10 Units  0-10 Units Subcutaneous QID (AC + HS) PRN James Baker MD   6 Units at 10/08/23 1640    [START ON 10/9/2023] methylPREDNISolone sodium succinate (SOLU-MEDROL) 1,000 mg in dextrose 5 % (D5W) 400 mL IV syringe  (conc 2.5 mg/mL)  1,000 mg Intravenous Daily James Baker MD        ondansetron injection 4 mg  4 mg Intravenous Q8H PRN Miinea, Jud, NP   4 mg at 10/08/23 0512    oxyCODONE immediate release tablet 5 mg  5 mg Oral Q4H PRN Miinea, Jud, NP   5 mg at 10/08/23 0524    oxyCODONE immediate release tablet Tab 10 mg  10 mg Oral Q4H PRN Miinea, Jud, NP   10 mg at 10/08/23 1128    pantoprazole EC tablet 40 mg  40 mg Oral Daily James Baker MD   40 mg at 10/08/23 0918    polyethylene glycol packet 17 g  17 g Oral Daily Clemencia Guerrero NP   17 g at 10/06/23 0806    simethicone chewable tablet 80 mg  1 tablet Oral TID PRN Lauren Jud, NP   80 mg at 10/03/23 1326    sodium chloride 0.9% flush 10 mL  10 mL Intravenous PRN Tenzin Verdin MD        sodium chloride 0.9% flush 10 mL  10 mL Intravenous PRN Ronnie Gardner MD         Review of Systems   Constitutional:  Positive for activity change and fatigue. Negative for fever.   HENT:  Negative for trouble swallowing and voice change.    Eyes:  Negative for visual disturbance.   Respiratory:  Negative for shortness of breath.    Cardiovascular:  Negative for chest pain and leg swelling.   Gastrointestinal:  Negative for nausea and vomiting.   Genitourinary:  Positive for difficulty urinating.   Musculoskeletal:  Positive for back pain and gait problem. Negative for neck stiffness.   Neurological:  Positive for weakness and numbness. Negative for tremors, seizures, syncope, facial asymmetry and speech difficulty.   Psychiatric/Behavioral:  Positive for dysphoric mood. Negative for confusion. The patient is nervous/anxious.      Objective:     Vital Signs (Most Recent):  Temp: 98 °F (36.7 °C) (10/08/23 0741)  Pulse: 68 (10/08/23 0741)  Resp: 17 (10/08/23 1128)  BP: 138/64 (10/08/23 0741)  SpO2: (!) 94 % (10/08/23 0741) Vital Signs (24h Range):  Temp:  [98 °F (36.7 °C)-99 °F (37.2 °C)] 98 °F (36.7 °C)  Pulse:  [68-94] 68  Resp:  [16-18] 17  SpO2:  [92 %-94 %]  94 %  BP: (133-163)/(61-71) 138/64     Weight: 91.6 kg (201 lb 15.1 oz)  Body mass index is 34.66 kg/m².     Physical Exam  Eyes:      Extraocular Movements: EOM normal.      Pupils: Pupils are equal, round, and reactive to light.   Neurological:      Mental Status: She is oriented to person, place, and time.      Coordination: Finger-Nose-Finger Test normal.      Deep Tendon Reflexes:      Reflex Scores:       Bicep reflexes are 2+ on the right side and 2+ on the left side.       Brachioradialis reflexes are 2+ on the right side and 2+ on the left side.       Patellar reflexes are 0 on the right side and 0 on the left side.       Achilles reflexes are 0 on the right side and 0 on the left side.  Psychiatric:         Mood and Affect: Mood is anxious.         Speech: Speech is rapid and pressured.         Behavior: Behavior normal. Behavior is cooperative.          NEUROLOGICAL EXAMINATION:     MENTAL STATUS   Oriented to person, place, and time.   Follows 2 step commands.   Attention: normal. Concentration: normal.   Level of consciousness: alert  Knowledge: good.   Normal comprehension.     CRANIAL NERVES     CN III, IV, VI   Pupils are equal, round, and reactive to light.  Extraocular motions are normal.   Nystagmus: none   Diplopia: none  Ophthalmoparesis: none    CN V   Facial sensation intact.     CN VII   Facial expression full, symmetric.     CN VIII   Hearing: intact    CN IX, X   Palate: symmetric    CN XI   CN XI normal.     CN XII   CN XII normal.     MOTOR EXAM   Muscle bulk: normal  Right arm pronator drift: absent  Left arm pronator drift: absent    Strength   Right deltoid: 5/5  Left deltoid: 5/5  Right biceps: 5/5  Left biceps: 5/5  Right triceps: 5/5  Left triceps: 5/5  Right interossei: 5/5  Left interossei: 5/5       BLE plegia  Unable to wiggle toes or move legs off mattress     REFLEXES     Reflexes   Right brachioradialis: 2+  Left brachioradialis: 2+  Right biceps: 2+  Left biceps: 2+  Right  patellar: 0  Left patellar: 0  Right achilles: 0  Left achilles: 0    SENSORY EXAM   Right leg light touch: decreased from knee  Left leg light touch: decreased from knee  Right leg vibration: decreased from knee  Left leg vibration: decreased from knee    GAIT AND COORDINATION     Gait  Gait: (deferred)     Coordination   Finger to nose coordination: normal    Tremor   Resting tremor: absent    Significant Labs: All pertinent lab results from the past 24 hours have been reviewed.    CSF: Protein 41, glucose 94, cell count clear 1 WBC, 0 % lymph    Pending:  CSF: NMO, MS, HSV, VZV, Enterovirus, WNV, VDRL, paraneo, autoimmune panel, ACE  Serum: CNS demyelinating disease panel, oligoclonal bands, ACE, paraneoplastic and autoimmune panel    Significant Imaging: I have reviewed all pertinent imaging results/findings within the past 24 hours.    MRI C/T/L spine W WO contrast (10/5/23):  Persistent abnormal central cord edema extending from T9 through the tip of the conus with associated increased patchy enhancement and interval development of cauda equina nerve root enhancement, findings that favor sequela of transverse myelitis/demyelinating disease or a post infectious process.  A subacute infarct is felt to be less likely given aforementioned nerve root enhancement.

## 2023-10-08 NOTE — ASSESSMENT & PLAN NOTE
sudden onset bilateral lower extremity paresis, urinary retention and paresthesia that began after heavy lifting on 9/18/23.   [facility] MRI cervical and thoracic spine revealed stenosis and T9-L1 central cord edema with mild enhancement. MRI Brain w/wo 9/25: patchy periventricular white matter T2/flair hyperintensities demylination vs microvascular disease  MRI C/T/L spine 9/25: Abnormal cord edema from T9 to conus  MRI spine demyelinating/MRA: possible diffusion restriction in distal thoracic spine/conus, non diagnostic MRA  --Cord findings suspicious for mass vs ischemia vs demyelinating disorder  -serum NMO negative  -Spinal angiogram on 10/2 neg for ischemia  -repeat MRI on 10/5 unremarkable for any new changes; s/p bedside LP by neurology largely unrevealing   - PT/OT  - no improvement after 3 doses of steroid so far, continue for 2 more doses per Neurology through 10/10

## 2023-10-08 NOTE — SUBJECTIVE & OBJECTIVE
Interval History:  Patient again affirms no improvement.  No chest pain, shortness a breath.  She is repeatedly complaining and concerned of a new cough that she believes she developed since starting steroids and says that it is coming from the abdomen.  Patient again affirms no improvement in lower extremity strength after steroid dosing.    Review of Systems  Objective:     Vital Signs (Most Recent):  Temp: 98 °F (36.7 °C) (10/08/23 0741)  Pulse: 68 (10/08/23 0741)  Resp: 17 (10/08/23 1128)  BP: 138/64 (10/08/23 0741)  SpO2: (!) 94 % (10/08/23 0741) Vital Signs (24h Range):  Temp:  [97.6 °F (36.4 °C)-99 °F (37.2 °C)] 98 °F (36.7 °C)  Pulse:  [68-94] 68  Resp:  [16-18] 17  SpO2:  [92 %-95 %] 94 %  BP: (132-163)/(61-84) 138/64     Weight: 91.6 kg (201 lb 15.1 oz)  Body mass index is 34.66 kg/m².    Intake/Output Summary (Last 24 hours) at 10/8/2023 1213  Last data filed at 10/8/2023 0719  Gross per 24 hour   Intake --   Output 1000 ml   Net -1000 ml         Physical Exam      Clear lungs bilaterally unlabored breathing, on room air, no cyanosis   Heart sounds indicate a regular rate and rhythm  Awake alert, no acute distress   Abdomen appears nondistended   Mildly anxious   5/5 proximal upper extremities bilaterally   0/5 lower extremity strength   No obvious lower extremity edema   Sitting up in bed

## 2023-10-08 NOTE — PROGRESS NOTES
[facility] ([facility])  Neurology  Progress Note    Patient Name: Rhina Forrest  MRN: [id number]  Admission Date: 9/25/2023  Hospital Length of Stay: 13 days  Code Status: Full Code   Attending Provider: James Baker MD  Primary Care Physician: Cal Alvarado, ROCIOP-C   Principal Problem:Acute paraplegia    HPI:   67 y.o. female with HTN, GERD, depression and tobacco abuse presented 9/25/23 with BLE plegia that began abruptly after lifting her rollator with groceries. Pt reports using a rollator x 3 years after a L knee injury (ACL, meniscus). She walked to [employer] to get groceries and tried to lift her walker and felt a pop. She knew she injured something in her back. She was able to make it home and walk to the bathroom, felt weak getting off the toilet, but was able to make it to her couch. Once lying down on the sofa, she was unable to get back up and called 911. Says over a matter of 3 hours, she was unable to move her legs at all and had abnormal sensation from waist down including lack of urge to urinate and have a bowel movement. She was brought to [facility] and underwent MRI neuro-axis showing T9-L1 central cord edema. CT Ab/pelvis also showed colitis and she was treated with cipro and flagyl. She was transferred to [facility] for higher level of care/NSGY evaluation. Underwent repeating imaging of neuro-axis and spinal angiogram (10/2) that was unremarkable. Neurology consulted 10/4 for evaluation of transverse myelitis and consideration for LP. Pt still with ongoing BLE plegia, urinary retention s/p barber catheter.           Overview/Hospital Course:  No notes on file        Subjective:     Interval History:   No acute events overnight.  No significant change in neuro exam.  Patient notes maybe increased paresthesias in her right leg.  Sensation present above level around umbilicus.  Decision to increase duration of steroids to 5 days.  IV solumedrol day 3 of 5.  Patient  interested in inpatient psychology consult.  She was previously on antidepressant and is unsure if she wants to restart at this time.    Current Facility-Administered Medications   Medication Dose Route Frequency Provider Last Rate Last Admin    atorvastatin tablet 20 mg  20 mg Oral Daily Jud Aguilar NP   20 mg at 10/08/23 0918    dextrose 10% bolus 125 mL 125 mL  12.5 g Intravenous PRN James Baker MD        dextrose 10% bolus 250 mL 250 mL  25 g Intravenous PRN James Baker MD        diazePAM tablet 5 mg  5 mg Oral Q4H PRN Jud Aguilar, NP   5 mg at 10/04/23 0540    enoxaparin injection 40 mg  40 mg Subcutaneous Q24H (prophylaxis, 1700) James Baker MD   40 mg at 10/08/23 1649    famotidine tablet 20 mg  20 mg Oral BID Clemencia Guerrero NP   20 mg at 10/08/23 0918    fluticasone propionate 50 mcg/actuation nasal spray 100 mcg  2 spray Each Nostril Daily James Baker MD   100 mcg at 10/08/23 1444    gabapentin capsule 800 mg  800 mg Oral TID Jud Aguilar NP   800 mg at 10/08/23 1444    glucagon (human recombinant) injection 1 mg  1 mg Intramuscular PRN James Baker MD        glucose chewable tablet 16 g  16 g Oral PRN James Baker MD        glucose chewable tablet 24 g  24 g Oral PRN James Baker MD        guaiFENesin 100 mg/5 ml syrup 200 mg  200 mg Oral Q6H PRN Tenzin Ruiz MD   200 mg at 10/08/23 0512    insulin aspart U-100 pen 0-10 Units  0-10 Units Subcutaneous QID (AC + HS) PRN James Baker MD   6 Units at 10/08/23 1640    [START ON 10/9/2023] methylPREDNISolone sodium succinate (SOLU-MEDROL) 1,000 mg in dextrose 5 % (D5W) 400 mL IV syringe (conc 2.5 mg/mL)  1,000 mg Intravenous Daily James Baker MD        ondansetron injection 4 mg  4 mg Intravenous Q8H PRN Jud Aguilar NP   4 mg at 10/08/23 0512    oxyCODONE immediate release tablet 5 mg  5 mg Oral Q4H PRN Jud Aguilar NP   5 mg at 10/08/23 0524    oxyCODONE  immediate release tablet Tab 10 mg  10 mg Oral Q4H PRN Jud Aguilar NP   10 mg at 10/08/23 1128    pantoprazole EC tablet 40 mg  40 mg Oral Daily James Baker MD   40 mg at 10/08/23 0918    polyethylene glycol packet 17 g  17 g Oral Daily Clemencia Guerrero NP   17 g at 10/06/23 0806    simethicone chewable tablet 80 mg  1 tablet Oral TID PRN Jud Aguilar NAYAN   80 mg at 10/03/23 1326    sodium chloride 0.9% flush 10 mL  10 mL Intravenous PRN Tenzin Verdin MD        sodium chloride 0.9% flush 10 mL  10 mL Intravenous PRN Ronnie Gardner MD         Review of Systems   Constitutional:  Positive for activity change and fatigue. Negative for fever.   HENT:  Negative for trouble swallowing and voice change.    Eyes:  Negative for visual disturbance.   Respiratory:  Negative for shortness of breath.    Cardiovascular:  Negative for chest pain and leg swelling.   Gastrointestinal:  Negative for nausea and vomiting.   Genitourinary:  Positive for difficulty urinating.   Musculoskeletal:  Positive for back pain and gait problem. Negative for neck stiffness.   Neurological:  Positive for weakness and numbness. Negative for tremors, seizures, syncope, facial asymmetry and speech difficulty.   Psychiatric/Behavioral:  Positive for dysphoric mood. Negative for confusion. The patient is nervous/anxious.      Objective:     Vital Signs (Most Recent):  Temp: 98 °F (36.7 °C) (10/08/23 0741)  Pulse: 68 (10/08/23 0741)  Resp: 17 (10/08/23 1128)  BP: 138/64 (10/08/23 0741)  SpO2: (!) 94 % (10/08/23 0741) Vital Signs (24h Range):  Temp:  [98 °F (36.7 °C)-99 °F (37.2 °C)] 98 °F (36.7 °C)  Pulse:  [68-94] 68  Resp:  [16-18] 17  SpO2:  [92 %-94 %] 94 %  BP: (133-163)/(61-71) 138/64     Weight: 91.6 kg (201 lb 15.1 oz)  Body mass index is 34.66 kg/m².     Physical Exam  Eyes:      Extraocular Movements: EOM normal.      Pupils: Pupils are equal, round, and reactive to light.   Neurological:      Mental Status: She is  oriented to person, place, and time.      Coordination: Finger-Nose-Finger Test normal.      Deep Tendon Reflexes:      Reflex Scores:       Bicep reflexes are 2+ on the right side and 2+ on the left side.       Brachioradialis reflexes are 2+ on the right side and 2+ on the left side.       Patellar reflexes are 0 on the right side and 0 on the left side.       Achilles reflexes are 0 on the right side and 0 on the left side.  Psychiatric:         Mood and Affect: Mood is anxious.         Speech: Speech is rapid and pressured.         Behavior: Behavior normal. Behavior is cooperative.          NEUROLOGICAL EXAMINATION:     MENTAL STATUS   Oriented to person, place, and time.   Follows 2 step commands.   Attention: normal. Concentration: normal.   Level of consciousness: alert  Knowledge: good.   Normal comprehension.     CRANIAL NERVES     CN III, IV, VI   Pupils are equal, round, and reactive to light.  Extraocular motions are normal.   Nystagmus: none   Diplopia: none  Ophthalmoparesis: none    CN V   Facial sensation intact.     CN VII   Facial expression full, symmetric.     CN VIII   Hearing: intact    CN IX, X   Palate: symmetric    CN XI   CN XI normal.     CN XII   CN XII normal.     MOTOR EXAM   Muscle bulk: normal  Right arm pronator drift: absent  Left arm pronator drift: absent    Strength   Right deltoid: 5/5  Left deltoid: 5/5  Right biceps: 5/5  Left biceps: 5/5  Right triceps: 5/5  Left triceps: 5/5  Right interossei: 5/5  Left interossei: 5/5       BLE plegia  Unable to wiggle toes or move legs off mattress     REFLEXES     Reflexes   Right brachioradialis: 2+  Left brachioradialis: 2+  Right biceps: 2+  Left biceps: 2+  Right patellar: 0  Left patellar: 0  Right achilles: 0  Left achilles: 0    SENSORY EXAM   Right leg light touch: decreased from knee  Left leg light touch: decreased from knee  Right leg vibration: decreased from knee  Left leg vibration: decreased from knee    GAIT AND  COORDINATION     Gait  Gait: (deferred)     Coordination   Finger to nose coordination: normal    Tremor   Resting tremor: absent    Significant Labs: All pertinent lab results from the past 24 hours have been reviewed.    CSF: Protein 41, glucose 94, cell count clear 1 WBC, 0 % lymph    Pending:  CSF: NMO, MS, HSV, VZV, Enterovirus, WNV, VDRL, paraneo, autoimmune panel, ACE  Serum: CNS demyelinating disease panel, oligoclonal bands, ACE, paraneoplastic and autoimmune panel    Significant Imaging: I have reviewed all pertinent imaging results/findings within the past 24 hours.    MRI C/T/L spine W WO contrast (10/5/23):  Persistent abnormal central cord edema extending from T9 through the tip of the conus with associated increased patchy enhancement and interval development of cauda equina nerve root enhancement, findings that favor sequela of transverse myelitis/demyelinating disease or a post infectious process.  A subacute infarct is felt to be less likely given aforementioned nerve root enhancement.    Assessment and Plan:     * Acute paraplegia  67 y.o. female with HTN, GERD, depression and tobacco abuse presented 9/25/23 with BLE plegia that began abruptly after lifting her rollator with groceries on 9/18. Says over a matter of 3 hours, she was unable to move her legs at all and had abnormal sensation from waist down including lack of urge to urinate and have a bowel movement. Given rapid progression of symptoms concern for vascular etiology from injury. MRI showed anterior cord involvement in the distal T spine with owl eye sign c/w ischemia. Conus enhancement is unusual and raises the question of concurrent demyelinating polyneuropathy in light of recent colitis.     Interval update: No acute events overnight.  No significant change in neuro exam.  Patient notes maybe increased paresthesias in her right leg.  Sensation present above level around umbilicus.  Decision to increase duration of steroids to  5 days.  IV solumedrol day 3 of 5.  Patient interested in inpatient psychology consult.  She was previously on antidepressant and is unsure if she wants to restart at this time.    Recommendations:  --F/U LP results at this time  --IV Solumedrol 1 gram qd x 5 days.   --Message sent to Neuroimmunology clinic support staff to arrange follow-up with Dr. Ware in next few weeks to review pending serum and CSF studies   --PT/OT. Dispo: rehab   --pain control per primary team  --consider inpatient consult to Psychology  --consider restarting previous anti depressant if patient is willing to help with adjusting the new disability    Paraplegia  67 y.o. female with HTN, GERD, depression and tobacco abuse presented 9/25/23 with BLE plegia that began abruptly after lifting her rollator with groceries on 9/18. Says over a matter of 3 hours, she was unable to move her legs at all and had abnormal sensation from waist down including lack of urge to urinate and have a bowel movement. Given rapid progression of symptoms concern for vascular etiology from injury. MRI showed anterior cord involvement in the distal T spine with owl eye sign c/w ischemia. Conus enhancement is unusual and raises the question of concurrent demyelinating polyneuropathy in light of recent colitis.     10/5--repeat MRI overnight showed persistent abnormal central cord edema extending from T9 through the tip of the conus with associated increased patchy enhancement and interval development of cauda equina nerve root enhancement. No change in clinical exam    10/6--LP done, CSF clear will cell count 1 WBC, no RBCs. Glucose elevated, protein mildly elevated. Rest of labs currently pending at this time    Recommendations:  --F/U LP results at this time  --Ordered IV Solumedrol 1 gram qd x 3 days. Close monitoring in setting of possible prior allergy to steroids, consider anxiolytic medications.  --Message sent to Neuroimmunology clinic support staff to arrange  follow-up with Dr. Ware in next few weeks to review pending serum and CSF studies   --PT/OT. Likely will need long term rehab   --pain control per primary team        VTE Risk Mitigation (From admission, onward)         Ordered     enoxaparin injection 40 mg  Every 24 hours         10/05/23 0845     IP VTE HIGH RISK PATIENT  Once         09/25/23 1119     Place sequential compression device  Until discontinued         09/25/23 1119     Reason for No Pharmacological VTE Prophylaxis  Once        Question:  Reasons:  Answer:  Risk of Bleeding    09/25/23 1119                Tio Aguilar MD  Neurology  [facility] - Neurosurgery ([facility])

## 2023-10-08 NOTE — PROGRESS NOTES
Thaddeus Christy - Neurosurgery ([facility])  [facility] Medicine  Progress Note    Patient Name: Rhina Forrest  MRN: [id number]  Patient Class: IP- Inpatient   Admission Date: 9/25/2023  Length of Stay: 13 days  Attending Physician: James Baker MD  Primary Care Provider: Cal Alvarado FNP-C        Subjective:     Principal Problem:Acute paraplegia        HPI:  Copied from Neuro-crit care team:  68 yo F PMHx GERD, HTN, smoker, depression presenting with bilateral lower extremity plegia that began after heavy lifting on 9/18/23. Pt walks with a walker at baseline and lives at home alone. On 9/18 she was walking home with groceries on her walker and lifted the walker over a curb and hurt her back. Pt was able to walk home and put her groceries away. Once she sat on the couch, she was unable to get back up as her legs stopped moving. She also had decreased sensation from T12 down. Pt called 911 and was taken via EMS to [facility]. MRI cervical and thoracic spine revealed stenosis and T9-L1 cord edema with mild enhancement suspicious for evolving cord infarct vs demyelinating process per radiology report (no imaging was sent with patient). Pt was found to have NSTEMI with troponin 1.1 and was started on ASA and plavix. Pt also c/o bl upper quadrant abdominal pain and CT abdomen pelvis revealed colitis. She was started on cipro and flagyl. Pt has had a barber since 9/18 and has not had any bowel movements since 9/18 despite aggressive bowel regimen at OSH. Transferred to [facility] for neurosurgical evaluation and further workup. Pt currently c/o severe abdominal pain, n/v, severe back pain, BUE rash x 1 month, BLE plegia and T12 sensory level.          Overview/Hospital Course:  ICU course - remarkable for bowel decompression with laxatives - rectal tube inserted, improved abdominal pain, discontinuation of all antibiotics, pain control issues (better on current rx). Stepped down to  10/1/23. On 10/2/23,  IR performed spinal angiogram - came back normal. NSGy recommended Neurology consult for LP and signed off. Hernandez catheter inserted the same day for urinary retention.  Repeat MRI is negative for any new or acute findings.  Neurology performed a bedside LP on 10/05/2023 with no significantly elevated WBCs. Hi dose steroids started on 10/6/23.               Interval History:  Patient again affirms no improvement.  No chest pain, shortness a breath.  She is repeatedly complaining and concerned of a new cough that she believes she developed since starting steroids and says that it is coming from the abdomen.  Patient again affirms no improvement in lower extremity strength after steroid dosing.    Review of Systems  Objective:     Vital Signs (Most Recent):  Temp: 98 °F (36.7 °C) (10/08/23 0741)  Pulse: 68 (10/08/23 0741)  Resp: 17 (10/08/23 1128)  BP: 138/64 (10/08/23 0741)  SpO2: (!) 94 % (10/08/23 0741) Vital Signs (24h Range):  Temp:  [97.6 °F (36.4 °C)-99 °F (37.2 °C)] 98 °F (36.7 °C)  Pulse:  [68-94] 68  Resp:  [16-18] 17  SpO2:  [92 %-95 %] 94 %  BP: (132-163)/(61-84) 138/64     Weight: 91.6 kg (201 lb 15.1 oz)  Body mass index is 34.66 kg/m².    Intake/Output Summary (Last 24 hours) at 10/8/2023 1213  Last data filed at 10/8/2023 0719  Gross per 24 hour   Intake --   Output 1000 ml   Net -1000 ml         Physical Exam      Clear lungs bilaterally unlabored breathing, on room air, no cyanosis   Heart sounds indicate a regular rate and rhythm  Awake alert, no acute distress   Abdomen appears nondistended   Mildly anxious   5/5 proximal upper extremities bilaterally   0/5 lower extremity strength   No obvious lower extremity edema   Sitting up in bed         Assessment/Plan:      * Acute paraplegia   sudden onset bilateral lower extremity paresis, urinary retention and paresthesia that began after heavy lifting on 9/18/23.   OSH MRI cervical and thoracic spine revealed stenosis and T9-L1 central cord edema with  mild enhancement. MRI Brain w/wo 9/25: patchy periventricular white matter T2/flair hyperintensities demylination vs microvascular disease  MRI C/T/L spine 9/25: Abnormal cord edema from T9 to conus  MRI spine demyelinating/MRA: possible diffusion restriction in distal thoracic spine/conus, non diagnostic MRA  --Cord findings suspicious for mass vs ischemia vs demyelinating disorder  -serum NMO negative  -Spinal angiogram on 10/2 neg for ischemia  -repeat MRI on 10/5 unremarkable for any new changes; s/p bedside LP by neurology largely unrevealing   - PT/OT  - no improvement after 3 doses of steroid so far, continue for 2 more doses per Neurology through 10/10      Cough  xr chest      Urinary retention  Related to suspected spinal cord injury.   CIC while in ICU, had high bladder residual 10/2 >800 on nursing assessment therefore barber catheter insertion ordered  -maintain barber catheter until more mobile and neurologic work up is completed        Colitis  Diagnosed at OSH based on upper quadrant abdominal pain and CT abdomen pelvis suggesting colitis. She was started on cipro and flagyl. Has not had any bowel movements since 9/18 despite aggressive bowel regimen at OSH.   CT a/p 9/26 - Large stool ball within the rectum with a suggestion of some rectal wall thickening.  Mild presacral soft tissue thickening/edema.  Findings raise the question of possible proctitis.  Improved symptoms w bowel movement  Off abx by the time she stepped down to me, has rectal tube in -> removed 10/3 since stool output was minimal  Bowel regimen stopped due to diarrhea        VTE Risk Mitigation (From admission, onward)         Ordered     enoxaparin injection 40 mg  Every 24 hours         10/05/23 0845     IP VTE HIGH RISK PATIENT  Once         09/25/23 1119     Place sequential compression device  Until discontinued         09/25/23 1119     Reason for No Pharmacological VTE Prophylaxis  Once        Question:  Reasons:  Answer:  Risk  of Bleeding    09/25/23 1119                Discharge Planning   DEMETRICE: 10/10/2023     Code Status: Full Code   Is the patient medically ready for discharge?: No    Reason for patient still in hospital (select all that apply): Patient trending condition, Laboratory test and Treatment  Discharge Plan A: Rehab   Discharge Delays: None known at this time              James Baker MD  Department of Hospital Medicine   [facility] - Neurosurgery ([facility])

## 2023-10-09 PROBLEM — G82.20 PARAPLEGIA: Status: RESOLVED | Noted: 2023-10-04 | Resolved: 2023-10-09

## 2023-10-09 LAB
ALBUMIN SERPL BCP-MCNC: 2.5 G/DL (ref 3.5–5.2)
ALP SERPL-CCNC: 156 U/L (ref 55–135)
ALT SERPL W/O P-5'-P-CCNC: 61 U/L (ref 10–44)
ANION GAP SERPL CALC-SCNC: 7 MMOL/L (ref 8–16)
AST SERPL-CCNC: 30 U/L (ref 10–40)
BASOPHILS # BLD AUTO: 0.01 K/UL (ref 0–0.2)
BASOPHILS NFR BLD: 0.2 % (ref 0–1.9)
BILIRUB SERPL-MCNC: 0.3 MG/DL (ref 0.1–1)
BUN SERPL-MCNC: 21 MG/DL (ref 8–23)
CALCIUM SERPL-MCNC: 9.1 MG/DL (ref 8.7–10.5)
CHLORIDE SERPL-SCNC: 103 MMOL/L (ref 95–110)
CO2 SERPL-SCNC: 27 MMOL/L (ref 23–29)
CREAT SERPL-MCNC: 0.6 MG/DL (ref 0.5–1.4)
DIFFERENTIAL METHOD: ABNORMAL
EOSINOPHIL # BLD AUTO: 0 K/UL (ref 0–0.5)
EOSINOPHIL NFR BLD: 0 % (ref 0–8)
ERYTHROCYTE [DISTWIDTH] IN BLOOD BY AUTOMATED COUNT: 12.9 % (ref 11.5–14.5)
EST. GFR  (NO RACE VARIABLE): >60 ML/MIN/1.73 M^2
GLUCOSE SERPL-MCNC: 147 MG/DL (ref 70–110)
HCT VFR BLD AUTO: 34.4 % (ref 37–48.5)
HGB BLD-MCNC: 11.2 G/DL (ref 12–16)
IMM GRANULOCYTES # BLD AUTO: 0.04 K/UL (ref 0–0.04)
IMM GRANULOCYTES NFR BLD AUTO: 0.8 % (ref 0–0.5)
KAPPA LC FREE CSF-MCNC: 0.07 MG/DL
LYMPHOCYTES # BLD AUTO: 0.7 K/UL (ref 1–4.8)
LYMPHOCYTES NFR BLD: 13.6 % (ref 18–48)
MCH RBC QN AUTO: 31.9 PG (ref 27–31)
MCHC RBC AUTO-ENTMCNC: 32.6 G/DL (ref 32–36)
MCV RBC AUTO: 98 FL (ref 82–98)
MONOCYTES # BLD AUTO: 0.6 K/UL (ref 0.3–1)
MONOCYTES NFR BLD: 10.7 % (ref 4–15)
NEUTROPHILS # BLD AUTO: 3.9 K/UL (ref 1.8–7.7)
NEUTROPHILS NFR BLD: 74.7 % (ref 38–73)
NRBC BLD-RTO: 0 /100 WBC
PHOSPHATE SERPL-MCNC: 2.7 MG/DL (ref 2.7–4.5)
PLATELET # BLD AUTO: 271 K/UL (ref 150–450)
PMV BLD AUTO: 9.4 FL (ref 9.2–12.9)
POCT GLUCOSE: 129 MG/DL (ref 70–110)
POCT GLUCOSE: 192 MG/DL (ref 70–110)
POCT GLUCOSE: 240 MG/DL (ref 70–110)
POCT GLUCOSE: 270 MG/DL (ref 70–110)
POCT GLUCOSE: 277 MG/DL (ref 70–110)
POTASSIUM SERPL-SCNC: 4 MMOL/L (ref 3.5–5.1)
PROT SERPL-MCNC: 6.2 G/DL (ref 6–8.4)
RBC # BLD AUTO: 3.51 M/UL (ref 4–5.4)
SODIUM SERPL-SCNC: 137 MMOL/L (ref 136–145)
VDRL CSF QL: NEGATIVE
WBC # BLD AUTO: 5.21 K/UL (ref 3.9–12.7)
WNV RNA CSF QL NAA+PROBE: NEGATIVE

## 2023-10-09 PROCEDURE — 99232 PR SUBSEQUENT HOSPITAL CARE,LEVL II: ICD-10-PCS | Mod: ,,, | Performed by: PSYCHIATRY & NEUROLOGY

## 2023-10-09 PROCEDURE — 94761 N-INVAS EAR/PLS OXIMETRY MLT: CPT

## 2023-10-09 PROCEDURE — 25000003 PHARM REV CODE 250: Performed by: HOSPITALIST

## 2023-10-09 PROCEDURE — 25000003 PHARM REV CODE 250: Performed by: NURSE PRACTITIONER

## 2023-10-09 PROCEDURE — 11000001 HC ACUTE MED/SURG PRIVATE ROOM

## 2023-10-09 PROCEDURE — 97112 NEUROMUSCULAR REEDUCATION: CPT | Mod: CO

## 2023-10-09 PROCEDURE — 63600175 PHARM REV CODE 636 W HCPCS: Performed by: HOSPITALIST

## 2023-10-09 PROCEDURE — 85025 COMPLETE CBC W/AUTO DIFF WBC: CPT | Performed by: NURSE PRACTITIONER

## 2023-10-09 PROCEDURE — 99232 SBSQ HOSP IP/OBS MODERATE 35: CPT | Mod: ,,, | Performed by: HOSPITALIST

## 2023-10-09 PROCEDURE — 99232 PR SUBSEQUENT HOSPITAL CARE,LEVL II: ICD-10-PCS | Mod: ,,, | Performed by: HOSPITALIST

## 2023-10-09 PROCEDURE — 99232 SBSQ HOSP IP/OBS MODERATE 35: CPT | Mod: ,,, | Performed by: PSYCHIATRY & NEUROLOGY

## 2023-10-09 PROCEDURE — 84100 ASSAY OF PHOSPHORUS: CPT | Performed by: NURSE PRACTITIONER

## 2023-10-09 PROCEDURE — 97530 THERAPEUTIC ACTIVITIES: CPT

## 2023-10-09 PROCEDURE — 36415 COLL VENOUS BLD VENIPUNCTURE: CPT | Performed by: STUDENT IN AN ORGANIZED HEALTH CARE EDUCATION/TRAINING PROGRAM

## 2023-10-09 PROCEDURE — 80053 COMPREHEN METABOLIC PANEL: CPT | Performed by: NURSE PRACTITIONER

## 2023-10-09 PROCEDURE — 83520 IMMUNOASSAY QUANT NOS NONAB: CPT | Performed by: STUDENT IN AN ORGANIZED HEALTH CARE EDUCATION/TRAINING PROGRAM

## 2023-10-09 PROCEDURE — 97535 SELF CARE MNGMENT TRAINING: CPT | Mod: CO

## 2023-10-09 PROCEDURE — 36415 COLL VENOUS BLD VENIPUNCTURE: CPT | Performed by: NURSE PRACTITIONER

## 2023-10-09 RX ADMIN — PANTOPRAZOLE SODIUM 40 MG: 40 TABLET, DELAYED RELEASE ORAL at 08:10

## 2023-10-09 RX ADMIN — GABAPENTIN 800 MG: 400 CAPSULE ORAL at 09:10

## 2023-10-09 RX ADMIN — INSULIN ASPART 3 UNITS: 100 INJECTION, SOLUTION INTRAVENOUS; SUBCUTANEOUS at 09:10

## 2023-10-09 RX ADMIN — FAMOTIDINE 20 MG: 20 TABLET ORAL at 09:10

## 2023-10-09 RX ADMIN — GABAPENTIN 800 MG: 400 CAPSULE ORAL at 04:10

## 2023-10-09 RX ADMIN — INSULIN ASPART 6 UNITS: 100 INJECTION, SOLUTION INTRAVENOUS; SUBCUTANEOUS at 04:10

## 2023-10-09 RX ADMIN — GABAPENTIN 800 MG: 400 CAPSULE ORAL at 08:10

## 2023-10-09 RX ADMIN — OXYCODONE HYDROCHLORIDE 5 MG: 5 TABLET ORAL at 06:10

## 2023-10-09 RX ADMIN — ATORVASTATIN CALCIUM 20 MG: 20 TABLET, FILM COATED ORAL at 08:10

## 2023-10-09 RX ADMIN — ENOXAPARIN SODIUM 40 MG: 40 INJECTION SUBCUTANEOUS at 04:10

## 2023-10-09 RX ADMIN — INSULIN ASPART 2 UNITS: 100 INJECTION, SOLUTION INTRAVENOUS; SUBCUTANEOUS at 01:10

## 2023-10-09 RX ADMIN — FLUTICASONE PROPIONATE 100 MCG: 50 SPRAY, METERED NASAL at 08:10

## 2023-10-09 RX ADMIN — METHYLPREDNISOLONE SODIUM SUCCINATE 1000 MG: 1 INJECTION INTRAMUSCULAR; INTRAVENOUS at 08:10

## 2023-10-09 RX ADMIN — FAMOTIDINE 20 MG: 20 TABLET ORAL at 08:10

## 2023-10-09 NOTE — PROGRESS NOTES
Thaddeus Christy - Neurosurgery ([facility])  Neurology  Progress Note    Patient Name: Rhina Forrest  MRN: [id number]  Admission Date: 9/25/2023  Hospital Length of Stay: 14 days  Code Status: Full Code   Attending Provider: James Baker MD  Primary Care Physician: Cal Alvarado, ROCIOP-C   Principal Problem:Acute paraplegia    HPI:   67 y.o. female with HTN, GERD, depression and tobacco abuse presented 9/25/23 with BLE plegia that began abruptly after lifting her rollator with groceries. Pt reports using a rollator x 3 years after a L knee injury (ACL, meniscus). She walked to [employer] to get groceries and tried to lift her walker and felt a pop. She knew she injured something in her back. She was able to make it home and walk to the bathroom, felt weak getting off the toilet, but was able to make it to her couch. Once lying down on the sofa, she was unable to get back up and called 911. Says over a matter of 3 hours, she was unable to move her legs at all and had abnormal sensation from waist down including lack of urge to urinate and have a bowel movement. She was brought to [facility] and underwent MRI neuro-axis showing T9-L1 central cord edema. CT Ab/pelvis also showed colitis and she was treated with cipro and flagyl. She was transferred to [facility] for higher level of care/NSGY evaluation. Underwent repeating imaging of neuro-axis and spinal angiogram (10/2) that was unremarkable. Neurology consulted 10/4 for evaluation of transverse myelitis and consideration for LP. Pt still with ongoing BLE plegia, urinary retention s/p barber catheter.           Overview/Hospital Course:  No notes on file        Subjective:     Interval History:   Patient seen and examined at bedside this AM. No acute overnight events, neuro exam with some possible increase paraesthesias in b/l lower extremities with some sensation present however strength exam and rest of neuro exam essentially unchanged. Sensation present above  level around umbilicus. On day 4 of 5 of IV solumedrol. Still very nervous regarding prognosis.    Current Facility-Administered Medications   Medication Dose Route Frequency Provider Last Rate Last Admin    atorvastatin tablet 20 mg  20 mg Oral Daily Jud Aguilar, NP   20 mg at 10/09/23 0820    dextrose 10% bolus 125 mL 125 mL  12.5 g Intravenous PRN James Baker MD        dextrose 10% bolus 250 mL 250 mL  25 g Intravenous PRN James Baker MD        diazePAM tablet 5 mg  5 mg Oral Q4H PRN Jud Aguilar, NP   5 mg at 10/04/23 0540    enoxaparin injection 40 mg  40 mg Subcutaneous Q24H (prophylaxis, 1700) James Baker MD   40 mg at 10/08/23 1649    famotidine tablet 20 mg  20 mg Oral BID Clemencia Guerrero NP   20 mg at 10/09/23 0820    fluticasone propionate 50 mcg/actuation nasal spray 100 mcg  2 spray Each Nostril Daily James Baker MD   100 mcg at 10/09/23 0820    gabapentin capsule 800 mg  800 mg Oral TID Jud Aguilar NP   800 mg at 10/09/23 0821    glucagon (human recombinant) injection 1 mg  1 mg Intramuscular PRN James Baker MD        glucose chewable tablet 16 g  16 g Oral PRN James Baker MD        glucose chewable tablet 24 g  24 g Oral PRN James Baker MD        guaiFENesin 100 mg/5 ml syrup 200 mg  200 mg Oral Q6H PRN Tenzin Ruiz MD   200 mg at 10/08/23 0512    insulin aspart U-100 pen 0-10 Units  0-10 Units Subcutaneous QID (AC + HS) PRN James Baker MD   8 Units at 10/08/23 2159    methylPREDNISolone sodium succinate (SOLU-MEDROL) 1,000 mg in dextrose 5 % (D5W) 400 mL IV syringe (conc 2.5 mg/mL)  1,000 mg Intravenous Daily James Baker MD   Stopped at 10/09/23 0848    ondansetron injection 4 mg  4 mg Intravenous Q8H PRN Jud Aguilar NP   4 mg at 10/08/23 0512    oxyCODONE immediate release tablet 5 mg  5 mg Oral Q4H PRN Jud Aguilar NP   5 mg at 10/09/23 0617    pantoprazole EC tablet 40 mg  40 mg Oral  Daily James Baker MD   40 mg at 10/09/23 0820    polyethylene glycol packet 17 g  17 g Oral Daily Clemencia Guerrero NP   17 g at 10/06/23 0806    simethicone chewable tablet 80 mg  1 tablet Oral TID PRN Jud Aguilar NP   80 mg at 10/03/23 1326    sodium chloride 0.9% flush 10 mL  10 mL Intravenous PRN Tenzin Verdin MD        sodium chloride 0.9% flush 10 mL  10 mL Intravenous PRN Ronnie Gardner MD         Review of Systems   Constitutional:  Positive for activity change and fatigue. Negative for fever.   HENT:  Negative for trouble swallowing and voice change.    Eyes:  Negative for visual disturbance.   Respiratory:  Negative for shortness of breath.    Cardiovascular:  Negative for chest pain and leg swelling.   Gastrointestinal:  Negative for nausea and vomiting.   Genitourinary:  Positive for difficulty urinating.   Musculoskeletal:  Positive for back pain and gait problem. Negative for neck stiffness.   Neurological:  Positive for weakness and numbness. Negative for tremors, seizures, syncope, facial asymmetry and speech difficulty.   Psychiatric/Behavioral:  Positive for dysphoric mood. Negative for confusion. The patient is nervous/anxious.      Objective:     Vital Signs (Most Recent):  Temp: 98.7 °F (37.1 °C) (10/09/23 0712)  Pulse: 70 (10/09/23 0712)  Resp: 20 (10/09/23 0712)  BP: 118/77 (10/09/23 0712)  SpO2: (!) 91 % (10/09/23 0712) Vital Signs (24h Range):  Temp:  [98.6 °F (37 °C)-99.1 °F (37.3 °C)] 98.7 °F (37.1 °C)  Pulse:  [70-83] 70  Resp:  [16-20] 20  SpO2:  [91 %-95 %] 91 %  BP: (118-156)/(63-77) 118/77     Weight: 91.6 kg (201 lb 15.1 oz)  Body mass index is 34.66 kg/m².     Physical Exam  Eyes:      Extraocular Movements: EOM normal.      Pupils: Pupils are equal, round, and reactive to light.   Neurological:      Mental Status: She is oriented to person, place, and time.      Coordination: Finger-Nose-Finger Test normal.      Deep Tendon Reflexes:      Reflex Scores:        Bicep reflexes are 2+ on the right side and 2+ on the left side.       Brachioradialis reflexes are 2+ on the right side and 2+ on the left side.       Patellar reflexes are 0 on the right side and 0 on the left side.       Achilles reflexes are 0 on the right side and 0 on the left side.  Psychiatric:         Mood and Affect: Mood is anxious.         Speech: Speech is rapid and pressured.         Behavior: Behavior normal. Behavior is cooperative.          NEUROLOGICAL EXAMINATION:     MENTAL STATUS   Oriented to person, place, and time.   Follows 2 step commands.   Attention: normal. Concentration: normal.   Level of consciousness: alert  Knowledge: good.   Normal comprehension.     CRANIAL NERVES     CN III, IV, VI   Pupils are equal, round, and reactive to light.  Extraocular motions are normal.   Nystagmus: none   Diplopia: none  Ophthalmoparesis: none    CN V   Facial sensation intact.     CN VII   Facial expression full, symmetric.     CN VIII   Hearing: intact    CN IX, X   Palate: symmetric    CN XI   CN XI normal.     CN XII   CN XII normal.     MOTOR EXAM   Muscle bulk: normal  Right arm pronator drift: absent  Left arm pronator drift: absent    Strength   Right deltoid: 5/5  Left deltoid: 5/5  Right biceps: 5/5  Left biceps: 5/5  Right triceps: 5/5  Left triceps: 5/5  Right interossei: 5/5  Left interossei: 5/5       BLE plegia  Unable to wiggle toes or move legs off mattress     REFLEXES     Reflexes   Right brachioradialis: 2+  Left brachioradialis: 2+  Right biceps: 2+  Left biceps: 2+  Right patellar: 0  Left patellar: 0  Right achilles: 0  Left achilles: 0    SENSORY EXAM   Right leg light touch: decreased from knee  Left leg light touch: decreased from knee  Right leg vibration: decreased from knee  Left leg vibration: decreased from knee    GAIT AND COORDINATION     Gait  Gait: (deferred)     Coordination   Finger to nose coordination: normal    Tremor   Resting tremor: absent    Significant Labs:  All pertinent lab results from the past 24 hours have been reviewed.    CSF: Protein 41, glucose 94, cell count clear 1 WBC, 0 % lymph    Pending:  CSF: NMO, MS, HSV, VZV, Enterovirus, WNV, VDRL, paraneo, autoimmune panel, ACE  Serum: CNS demyelinating disease panel, oligoclonal bands, ACE, paraneoplastic and autoimmune panel    Significant Imaging: I have reviewed all pertinent imaging results/findings within the past 24 hours.    MRI C/T/L spine W WO contrast (10/5/23):  Persistent abnormal central cord edema extending from T9 through the tip of the conus with associated increased patchy enhancement and interval development of cauda equina nerve root enhancement, findings that favor sequela of transverse myelitis/demyelinating disease or a post infectious process.  A subacute infarct is felt to be less likely given aforementioned nerve root enhancement.    Assessment and Plan:     * Acute paraplegia  67 y.o. female with HTN, GERD, depression and tobacco abuse presented 9/25/23 with BLE plegia that began abruptly after lifting her rollator with groceries on 9/18. Says over a matter of 3 hours, she was unable to move her legs at all and had abnormal sensation from waist down including lack of urge to urinate and have a bowel movement. Given rapid progression of symptoms concern for vascular etiology from injury. MRI showed anterior cord involvement in the distal T spine with owl eye sign c/w ischemia. Conus enhancement is unusual and raises the question of concurrent demyelinating polyneuropathy in light of recent colitis.     - No significant change in neuro exam. Now reporting increased paraesthesias in b/l lower extremities  - Currently day 4 of 5 of IV solumedrol after agreeing to extend dose  - Remains interested in inpatient psychology consult due to prior antidepressant usage    Recommendations:  - Continue IV Solumedrol 1 gram qd x 5 days (Day 4 of 5)  - Add GQ1B autoantibody test to labs  - Likely  dispo to rehab if remains stable following completion of steroids  - If neurological exam is worsening after evaluation tomorrow will need to consider escalation of therapy such as IVIG vs PLEX  - Consider psych consult for reinitiation of antidepressants   - Pain control per primary team        VTE Risk Mitigation (From admission, onward)         Ordered     enoxaparin injection 40 mg  Every 24 hours         10/05/23 0845     IP VTE HIGH RISK PATIENT  Once         09/25/23 1119     Place sequential compression device  Until discontinued         09/25/23 1119     Reason for No Pharmacological VTE Prophylaxis  Once        Question:  Reasons:  Answer:  Risk of Bleeding    09/25/23 1119                Jaison Vargas MD PGY-III  Neurology  [facility] - Neurosurgery ([facility])

## 2023-10-09 NOTE — SUBJECTIVE & OBJECTIVE
Interval History:  No chest pain, shortness a breath.  Patient again affirms no significant motor improvement in the lower extremities.    Review of Systems  Objective:     Vital Signs (Most Recent):  Temp: 99 °F (37.2 °C) (10/09/23 1115)  Pulse: 85 (10/09/23 1117)  Resp: 20 (10/09/23 1115)  BP: (!) 178/91 (10/09/23 1117)  SpO2: (!) 92 % (10/09/23 1117) Vital Signs (24h Range):  Temp:  [98.6 °F (37 °C)-99.1 °F (37.3 °C)] 99 °F (37.2 °C)  Pulse:  [70-85] 85  Resp:  [16-20] 20  SpO2:  [91 %-95 %] 92 %  BP: (118-185)/(63-91) 178/91     Weight: 91.6 kg (201 lb 15.1 oz)  Body mass index is 34.66 kg/m².    Intake/Output Summary (Last 24 hours) at 10/9/2023 1436  Last data filed at 10/8/2023 1850  Gross per 24 hour   Intake --   Output 1000 ml   Net -1000 ml         Physical Exam      Clear lungs bilaterally, unlabored breathing, on room air, no cyanosis  Heart sounds indicate a regular rate and rhythm   5/5 proximal upper extremity strength bilaterally including fist   0/5 lower extremity strength   Hernandez catheter in place   No obvious lower extremity Edema  Awake alert

## 2023-10-09 NOTE — PT/OT/SLP PROGRESS
"Physical Therapy Treatment    Patient Name:  Rhina Forrest   MRN:  [id number]    Recent Surgery: * No surgery found *      Recommendations:     Discharge Recommendations:  rehabilitation facility   Discharge Equipment Recommendations: to be determined by next level of care   Barriers to discharge: Increased level of assist    Assessment:     Rhina Forrest is a 67 y.o. female admitted with a medical diagnosis of Acute paraplegia.    Pt met with HOB elevated and agreeable to PT treatment. Today's planned treatment focus was practicing lateral slideboard transfers from bed to w/c. Pt noted to be hypertensive above parameters (185/84) on PT attempt and pt c/o feeling "flush." OOB mobility deferred today. Writing therapist provided PROM B LEs to reduce the risk of contractures and educated pt on repositioning best practice. Pt is very motivated to participate.     Pt is progressing towards acute PT goals appropriately and continues to benefit from acute PT sessions. After hospital discharge, pt would benefit from inpatient rehab to maximize rehab potential.    Rehab Prognosis: Good; patient would benefit from acute skilled PT services to address these deficits and reach maximum level of function.      Plan:     During this hospitalization, patient to be seen 4 x/week to address the identified rehab impairments via therapeutic activities, therapeutic exercises, wheelchair management/training, neuromuscular re-education and progress toward the following goals:    Plan of Care Expires:  11/03/23    This plan of care has been discussed with the patient/caregiver, who was included in its development and is in agreement with the identified goals and treatment plan.     Subjective     Communicated with RN prior to session.  Patient agreeable to participate.     Pain/Comfort:  Pain Rating 1:  (unrated)  Location - Orientation 1: generalized  Location 1: back  Pain Addressed 1: Distraction, Reposition  Pain Rating " "Post-Intervention 1:  (unrated)    Chief Complaint: Acute paraplegia   Patient/Family Comments/goals: "I've been trying my best to move my legs"      Objective:     Patient found HOB elevated with telemetry, SCD, barber catheter  upon PT entry to room.    General Precautions: Standard, fall   Orthopedic Precautions:N/A   Braces: N/A         Exams:    Cognition:  Patient is oriented to Person, Place, Time, Situation  Follows multistep  commands  Insight to deficits/safety awareness: intact    Functional Mobility:  Deferred due to hypertension       Therapeutic Activities/Exercises     PT performed PROM B LEs:  Hip FL x15 reps  Hip ABD/ADD x15 reps  Knee FL/EXT x15 reps  Ankle DF/PF x15 reps  Patient instructed to reposition in bed at least every 2 hours to reduce the risk of skin breakdown during hospital stay.  White board updated regarding patient's safest level of mobility with staff assistance, RN also updated.     AM-PAC 6 CLICK MOBILITY  Turning over in bed (including adjusting bedclothes, sheets and blankets)?: 2  Sitting down on and standing up from a chair with arms (e.g., wheelchair, bedside commode, etc.): 1  Moving from lying on back to sitting on the side of the bed?: 2  Moving to and from a bed to a chair (including a wheelchair)?: 1  Need to walk in hospital room?: 1  Climbing 3-5 steps with a railing?: 1  Basic Mobility Total Score: 8     Patient left HOB elevated with all lines intact, call button in reach, bed alarm on, and RN notified.        History/Goals:     PAST MEDICAL HISTORY:  Past Medical History:   Diagnosis Date    Abnormal heart rhythm     Acid reflux     Bile duct perforation     Diverticular disease of large intestine without perforation or abscess     Essential (primary) hypertension     Fatigue     Gastritis     GERD (gastroesophageal reflux disease)     Hepatic dysfunction        Past Surgical History:   Procedure Laterality Date    CHOLECYSTECTOMY         GOALS:   Multidisciplinary " Problems       Physical Therapy Goals          Problem: Physical Therapy    Goal Priority Disciplines Outcome Goal Variances Interventions   Physical Therapy Goal     PT, PT/OT Ongoing, Progressing     Description: Goals to be completed by: 11/3/23    Pt will perform sup<>sit transfers w/ minimum assistance  Pt will have sufficient dynamic balance to sit EOB w/ stand by assistance for 5min  Pt will be able to transfer from bed to w/c w/ modA using slideboard  Pt will be independent w/ HEP therex on BLE w/ good form and ROM   Pt will be independent w/ propelling w/c 100'                       Time Tracking:     PT Received On: 10/09/23  PT Start Time: 1105     PT Stop Time: 1129  PT Total Time (min): 24 min     Billable Minutes: Therapeutic Activity 24      Ju Mendez, PT  10/09/2023  Pager# [phone]

## 2023-10-09 NOTE — PLAN OF CARE
Problem: Adult Inpatient Plan of Care  Goal: Plan of Care Review  Outcome: Ongoing, Progressing  Goal: Patient-Specific Goal (Individualized)  Outcome: Ongoing, Progressing  Goal: Optimal Comfort and Wellbeing  Outcome: Ongoing, Progressing  Goal: Readiness for Transition of Care  Outcome: Ongoing, Progressing     Problem: Adjustment to Illness (Stroke, Hemorrhagic)  Goal: Optimal Coping  Outcome: Ongoing, Progressing     Problem: Bowel Elimination Impaired (Stroke, Hemorrhagic)  Goal: Effective Bowel Elimination  Outcome: Ongoing, Progressing     Problem: Cerebral Tissue Perfusion (Stroke, Hemorrhagic)  Goal: Optimal Cerebral Tissue Perfusion  Outcome: Ongoing, Progressing     Problem: Cognitive Impairment (Stroke, Hemorrhagic)  Goal: Optimal Cognitive Function  Outcome: Ongoing, Progressing     Problem: Communication Impairment (Stroke, Hemorrhagic)  Goal: Effective Communication Skills  Outcome: Ongoing, Progressing     Problem: Functional Ability Impaired (Stroke, Hemorrhagic)  Goal: Optimal Functional Ability  Outcome: Ongoing, Progressing     Problem: Pain (Stroke, Hemorrhagic)  Goal: Acceptable Pain Control  Outcome: Ongoing, Progressing     Problem: Respiratory Compromise (Stroke, Hemorrhagic)  Goal: Effective Oxygenation and Ventilation  Outcome: Ongoing, Progressing     Problem: Sensorimotor Impairment (Stroke, Hemorrhagic)  Goal: Improved Sensorimotor Function  Outcome: Ongoing, Progressing     Problem: Swallowing Impairment (Stroke, Hemorrhagic)  Goal: Optimal Eating and Swallowing Without Aspiration  Outcome: Ongoing, Progressing     Problem: Urinary Elimination Impaired (Stroke, Hemorrhagic)  Goal: Effective Urinary Elimination  Outcome: Ongoing, Progressing     Problem: Skin Injury Risk Increased  Goal: Skin Health and Integrity  Outcome: Ongoing, Progressing     Problem: Impaired Wound Healing  Goal: Optimal Wound Healing  Outcome: Ongoing, Progressing     Problem: Infection  Goal: Absence of  Infection Signs and Symptoms  Outcome: Ongoing, Progressing

## 2023-10-09 NOTE — PROGRESS NOTES
[facility] - Neurosurgery ([facility])  [facility] Medicine  Progress Note    Patient Name: Rhina Forrest  MRN: [id number]  Patient Class: IP- Inpatient   Admission Date: 9/25/2023  Length of Stay: 14 days  Attending Physician: James Baker MD  Primary Care Provider: Cal Alvarado FNP-C        Subjective:     Principal Problem:Acute paraplegia        HPI:  Copied from Neuro-crit care team:  66 yo F PMHx GERD, HTN, smoker, depression presenting with bilateral lower extremity plegia that began after heavy lifting on 9/18/23. Pt walks with a walker at baseline and lives at home alone. On 9/18 she was walking home with groceries on her walker and lifted the walker over a curb and hurt her back. Pt was able to walk home and put her groceries away. Once she sat on the couch, she was unable to get back up as her legs stopped moving. She also had decreased sensation from T12 down. Pt called 911 and was taken via EMS to [facility]. MRI cervical and thoracic spine revealed stenosis and T9-L1 cord edema with mild enhancement suspicious for evolving cord infarct vs demyelinating process per radiology report (no imaging was sent with patient). Pt was found to have NSTEMI with troponin 1.1 and was started on ASA and plavix. Pt also c/o bl upper quadrant abdominal pain and CT abdomen pelvis revealed colitis. She was started on cipro and flagyl. Pt has had a barber since 9/18 and has not had any bowel movements since 9/18 despite aggressive bowel regimen at OSH. Transferred to [facility] for neurosurgical evaluation and further workup. Pt currently c/o severe abdominal pain, n/v, severe back pain, BUE rash x 1 month, BLE plegia and T12 sensory level.          Overview/Hospital Course:  ICU course - remarkable for bowel decompression with laxatives - rectal tube inserted, improved abdominal pain, discontinuation of all antibiotics, pain control issues (better on current rx). Stepped down to  10/1/23. On 10/2/23,  IR performed spinal angiogram - came back normal. NSGy recommended Neurology consult for LP and signed off. Hernandez catheter inserted the same day for urinary retention.  Repeat MRI is negative for any new or acute findings.  Neurology performed a bedside LP on 10/05/2023 with no significantly elevated WBCs. Hi dose steroids started on 10/6/23.               Interval History:  No chest pain, shortness a breath.  Patient again affirms no significant motor improvement in the lower extremities.    Review of Systems  Objective:     Vital Signs (Most Recent):  Temp: 99 °F (37.2 °C) (10/09/23 1115)  Pulse: 85 (10/09/23 1117)  Resp: 20 (10/09/23 1115)  BP: (!) 178/91 (10/09/23 1117)  SpO2: (!) 92 % (10/09/23 1117) Vital Signs (24h Range):  Temp:  [98.6 °F (37 °C)-99.1 °F (37.3 °C)] 99 °F (37.2 °C)  Pulse:  [70-85] 85  Resp:  [16-20] 20  SpO2:  [91 %-95 %] 92 %  BP: (118-185)/(63-91) 178/91     Weight: 91.6 kg (201 lb 15.1 oz)  Body mass index is 34.66 kg/m².    Intake/Output Summary (Last 24 hours) at 10/9/2023 1436  Last data filed at 10/8/2023 1850  Gross per 24 hour   Intake --   Output 1000 ml   Net -1000 ml         Physical Exam      Clear lungs bilaterally, unlabored breathing, on room air, no cyanosis  Heart sounds indicate a regular rate and rhythm   5/5 proximal upper extremity strength bilaterally including fist   0/5 lower extremity strength   Hernandez catheter in place   No obvious lower extremity Edema  Awake alert       Assessment/Plan:      * Acute paraplegia   sudden onset bilateral lower extremity paresis, urinary retention and paresthesia that began after heavy lifting on 9/18/23.   OSH MRI cervical and thoracic spine revealed stenosis and T9-L1 central cord edema with mild enhancement. MRI Brain w/wo 9/25: patchy periventricular white matter T2/flair hyperintensities demylination vs microvascular disease  MRI C/T/L spine 9/25: Abnormal cord edema from T9 to conus  MRI spine demyelinating/MRA: possible  diffusion restriction in distal thoracic spine/conus, non diagnostic MRA  --Cord findings suspicious for mass vs ischemia vs demyelinating disorder  -serum NMO negative  -Spinal angiogram on 10/2 neg for ischemia  -repeat MRI on 10/5 unremarkable for any new changes; s/p bedside LP by neurology largely unrevealing   - PT/OT  - no improvement after 3 doses of steroid so far, continue for 2 more doses per Neurology through 10/10      Cough   Benign; neg xr chest      Urinary retention  Related to suspected spinal cord injury.   CIC while in ICU, had high bladder residual 10/2 >800 on nursing assessment therefore barber catheter insertion ordered  -maintain barber catheter until more mobile and neurologic work up is completed        Colitis  Diagnosed at OSH based on upper quadrant abdominal pain and CT abdomen pelvis suggesting colitis. She was started on cipro and flagyl. Has not had any bowel movements since 9/18 despite aggressive bowel regimen at OSH.   CT a/p 9/26 - Large stool ball within the rectum with a suggestion of some rectal wall thickening.  Mild presacral soft tissue thickening/edema.  Findings raise the question of possible proctitis.  Improved symptoms w bowel movement  Off abx by the time she stepped down to me, has rectal tube in -> removed 10/3 since stool output was minimal  Bowel regimen stopped due to diarrhea        VTE Risk Mitigation (From admission, onward)         Ordered     enoxaparin injection 40 mg  Every 24 hours         10/05/23 0845     IP VTE HIGH RISK PATIENT  Once         09/25/23 1119     Place sequential compression device  Until discontinued         09/25/23 1119     Reason for No Pharmacological VTE Prophylaxis  Once        Question:  Reasons:  Answer:  Risk of Bleeding    09/25/23 1119                Discharge Planning   DEMETRICE: 10/10/2023     Code Status: Full Code   Is the patient medically ready for discharge?: No    Reason for patient still in hospital (select all that apply):  Patient trending condition, Treatment and Consult recommendations  Discharge Plan A: Rehab   Discharge Delays: None known at this time              James Baker MD  Department of Hospital Medicine   [facility] - Neurosurgery ([facility])

## 2023-10-09 NOTE — PT/OT/SLP PROGRESS
"Occupational Therapy   Treatment    Name: Rhina Forrest  MRN: [id number]  Admitting Diagnosis:  Acute paraplegia       Recommendations:     Discharge Recommendations: rehabilitation facility  Discharge Equipment Recommendations:  to be determined by next level of care  Barriers to discharge:  None    Assessment:     Rhina Forrest is a 67 y.o. female with a medical diagnosis of Acute paraplegia.  She presents with the following performance deficits affecting function are weakness, impaired endurance, impaired self care skills, impaired functional mobility, impaired sensation, gait instability, decreased lower extremity function, decreased upper extremity function, decreased coordination, abnormal tone, pain, decreased safety awareness, impaired balance, impaired coordination. Patient primarily limited by c/o dizziness today. Patient is progressing fair with OT intervention; however, patient is highly motivated and was participative throughout therapy today. Patient would benefit from continued OT services to address deficits and progress towards goals. Continue OT POC.    Rehab Prognosis:  Good; patient would benefit from acute skilled OT services to address these deficits and reach maximum level of function.       Plan:     Patient to be seen 4 x/week to address the above listed problems via self-care/home management, therapeutic exercises, therapeutic activities, neuromuscular re-education  Plan of Care Expires: 11/03/23  Plan of Care Reviewed with: patient    Subjective     Chief Complaint: c/o dizziness  Patient/Family Comments/goals: "I'm trying. I don't think I can, but I'll try."  Pain/Comfort:  Pain Rating 1:  (unrated)  Location - Orientation 1: generalized  Location 1: back  Pain Addressed 1: Reposition, Distraction, Nurse notified  Pain Rating Post-Intervention 1: 0/10 (at rest)    Objective:     Communicated with: nurse hortencia ramesh prior to session.  Patient found HOB elevated with bed alarm, SCD, " telemetry, barber catheter upon OT entry to room.    General Precautions: Standard, fall    Orthopedic Precautions:N/A  Braces: N/A  Respiratory Status: Room air     Occupational Performance:     Bed Mobility:    Patient completed Rolling/Turning to Left with  minimum assistance and with side rail  Patient completed Rolling/Turning to Right with minimum assistance and with side rail  Patient completed Scooting anteriorly to plant B feet on floor with maximal assistance  Scooting in supine towards HOB using overhead bed rails and Trendelenburg with SBA  Patient completed Supine to Sit with maximal assistance  Patient completed Sit to Supine with moderate assistance and HOB flat with B LE management     Functional Mobility/Transfers:  Functional Mobility: Patient tolerated ~10 min seated EOB requiring Mod(A) for static sitting balance; max cues for anterior weight shift and hand placement to sustain midline    Activities of Daily Living:  Upper Body Dressing: maximal assistance doff and don clean gown in supine  Toileting: total assistance (patient received incontinent of BM requiring perirectal hygiene and gown change)  Patient actively having BM end of therapy again. Nurse notified.       [facility] 6 Click ADL: 9    Treatment & Education:  Patient educated on OT POC, goals, current progress, proper body mechanics, and the importance of OOB activity. Instruction and facilitation in weight shifting, hand placement, and core activation to promote trunk control during EOB activities. Patient with decreased core activation in comparison to last therapy session noted. Addressed all patient questions/concerns within LEON scope of practice.     Patient left HOB elevated with all lines intact, call button in reach, bed alarm on, and nurse notified    GOALS:   Multidisciplinary Problems       Occupational Therapy Goals          Problem: Occupational Therapy    Goal Priority Disciplines Outcome Interventions   Occupational Therapy  Goal     OT, PT/OT Ongoing, Progressing    Description: Goals to be met by: 11/3/23    Patient will increase functional independence with ADLs by performing:    UE Dressing with Cass.  LE Dressing with Moderate Assistance.  Grooming while seated at sink with Minimal Assistance.  Toileting from bedside commode with Moderate Assistance for hygiene and clothing management.   Toilet transfer to bedside commode with Moderate Assistance.                       Time Tracking:     OT Date of Treatment: 10/09/23  OT Start Time: 1408  OT Stop Time: 1439  OT Total Time (min): 31 min    Billable Minutes:Self Care/Home Management 20  Neuromuscular Re-education 11    OT/RAHUL: RAHUL     Number of RAHUL visits since last OT visit: 3    10/9/2023

## 2023-10-09 NOTE — SUBJECTIVE & OBJECTIVE
Subjective:     Interval History:   Patient seen and examined at bedside this AM. No acute overnight events, neuro exam with some possible increase paraesthesias in b/l lower extremities with some sensation present however strength exam and rest of neuro exam essentially unchanged. Sensation present above level around umbilicus. On day 4 of 5 of IV solumedrol. Still very nervous regarding prognosis.    Current Facility-Administered Medications   Medication Dose Route Frequency Provider Last Rate Last Admin    atorvastatin tablet 20 mg  20 mg Oral Daily Jud Aguilar NP   20 mg at 10/09/23 0820    dextrose 10% bolus 125 mL 125 mL  12.5 g Intravenous PRN James Baker MD        dextrose 10% bolus 250 mL 250 mL  25 g Intravenous PRN James Baker MD        diazePAM tablet 5 mg  5 mg Oral Q4H PRN Jud Aguilar NP   5 mg at 10/04/23 0540    enoxaparin injection 40 mg  40 mg Subcutaneous Q24H (prophylaxis, 1700) James Baker MD   40 mg at 10/08/23 1649    famotidine tablet 20 mg  20 mg Oral BID Clemencia Guerrero NP   20 mg at 10/09/23 0820    fluticasone propionate 50 mcg/actuation nasal spray 100 mcg  2 spray Each Nostril Daily James Baker MD   100 mcg at 10/09/23 0820    gabapentin capsule 800 mg  800 mg Oral TID Jud Aguilar NP   800 mg at 10/09/23 0821    glucagon (human recombinant) injection 1 mg  1 mg Intramuscular PRN aJmes Baker MD        glucose chewable tablet 16 g  16 g Oral PRN James Baker MD        glucose chewable tablet 24 g  24 g Oral PRN James Baker MD        guaiFENesin 100 mg/5 ml syrup 200 mg  200 mg Oral Q6H PRN Tenzin Ruiz MD   200 mg at 10/08/23 0512    insulin aspart U-100 pen 0-10 Units  0-10 Units Subcutaneous QID (AC + HS) PRN James Baker MD   8 Units at 10/08/23 2159    methylPREDNISolone sodium succinate (SOLU-MEDROL) 1,000 mg in dextrose 5 % (D5W) 400 mL IV syringe (conc 2.5 mg/mL)  1,000 mg Intravenous Daily Olivia,  James COELHO MD   Stopped at 10/09/23 0848    ondansetron injection 4 mg  4 mg Intravenous Q8H PRN Jud Aguilar NP   4 mg at 10/08/23 0512    oxyCODONE immediate release tablet 5 mg  5 mg Oral Q4H PRN Lauren Jud NP   5 mg at 10/09/23 0617    pantoprazole EC tablet 40 mg  40 mg Oral Daily James Baker MD   40 mg at 10/09/23 0820    polyethylene glycol packet 17 g  17 g Oral Daily Clemencia GuerreroNAYAN   17 g at 10/06/23 0806    simethicone chewable tablet 80 mg  1 tablet Oral TID PRN Jud Aguilar NP   80 mg at 10/03/23 1326    sodium chloride 0.9% flush 10 mL  10 mL Intravenous PRN Tenzin Verdin MD        sodium chloride 0.9% flush 10 mL  10 mL Intravenous PRN Ronnie Gardner MD         Review of Systems   Constitutional:  Positive for activity change and fatigue. Negative for fever.   HENT:  Negative for trouble swallowing and voice change.    Eyes:  Negative for visual disturbance.   Respiratory:  Negative for shortness of breath.    Cardiovascular:  Negative for chest pain and leg swelling.   Gastrointestinal:  Negative for nausea and vomiting.   Genitourinary:  Positive for difficulty urinating.   Musculoskeletal:  Positive for back pain and gait problem. Negative for neck stiffness.   Neurological:  Positive for weakness and numbness. Negative for tremors, seizures, syncope, facial asymmetry and speech difficulty.   Psychiatric/Behavioral:  Positive for dysphoric mood. Negative for confusion. The patient is nervous/anxious.      Objective:     Vital Signs (Most Recent):  Temp: 98.7 °F (37.1 °C) (10/09/23 0712)  Pulse: 70 (10/09/23 0712)  Resp: 20 (10/09/23 0712)  BP: 118/77 (10/09/23 0712)  SpO2: (!) 91 % (10/09/23 0712) Vital Signs (24h Range):  Temp:  [98.6 °F (37 °C)-99.1 °F (37.3 °C)] 98.7 °F (37.1 °C)  Pulse:  [70-83] 70  Resp:  [16-20] 20  SpO2:  [91 %-95 %] 91 %  BP: (118-156)/(63-77) 118/77     Weight: 91.6 kg (201 lb 15.1 oz)  Body mass index is 34.66 kg/m².     Physical Exam  Eyes:       Extraocular Movements: EOM normal.      Pupils: Pupils are equal, round, and reactive to light.   Neurological:      Mental Status: She is oriented to person, place, and time.      Coordination: Finger-Nose-Finger Test normal.      Deep Tendon Reflexes:      Reflex Scores:       Bicep reflexes are 2+ on the right side and 2+ on the left side.       Brachioradialis reflexes are 2+ on the right side and 2+ on the left side.       Patellar reflexes are 0 on the right side and 0 on the left side.       Achilles reflexes are 0 on the right side and 0 on the left side.  Psychiatric:         Mood and Affect: Mood is anxious.         Speech: Speech is rapid and pressured.         Behavior: Behavior normal. Behavior is cooperative.          NEUROLOGICAL EXAMINATION:     MENTAL STATUS   Oriented to person, place, and time.   Follows 2 step commands.   Attention: normal. Concentration: normal.   Level of consciousness: alert  Knowledge: good.   Normal comprehension.     CRANIAL NERVES     CN III, IV, VI   Pupils are equal, round, and reactive to light.  Extraocular motions are normal.   Nystagmus: none   Diplopia: none  Ophthalmoparesis: none    CN V   Facial sensation intact.     CN VII   Facial expression full, symmetric.     CN VIII   Hearing: intact    CN IX, X   Palate: symmetric    CN XI   CN XI normal.     CN XII   CN XII normal.     MOTOR EXAM   Muscle bulk: normal  Right arm pronator drift: absent  Left arm pronator drift: absent    Strength   Right deltoid: 5/5  Left deltoid: 5/5  Right biceps: 5/5  Left biceps: 5/5  Right triceps: 5/5  Left triceps: 5/5  Right interossei: 5/5  Left interossei: 5/5       BLE plegia  Unable to wiggle toes or move legs off mattress     REFLEXES     Reflexes   Right brachioradialis: 2+  Left brachioradialis: 2+  Right biceps: 2+  Left biceps: 2+  Right patellar: 0  Left patellar: 0  Right achilles: 0  Left achilles: 0    SENSORY EXAM   Right leg light touch: decreased from knee  Left  leg light touch: decreased from knee  Right leg vibration: decreased from knee  Left leg vibration: decreased from knee    GAIT AND COORDINATION     Gait  Gait: (deferred)     Coordination   Finger to nose coordination: normal    Tremor   Resting tremor: absent    Significant Labs: All pertinent lab results from the past 24 hours have been reviewed.    CSF: Protein 41, glucose 94, cell count clear 1 WBC, 0 % lymph    Pending:  CSF: NMO, MS, HSV, VZV, Enterovirus, WNV, VDRL, paraneo, autoimmune panel, ACE  Serum: CNS demyelinating disease panel, oligoclonal bands, ACE, paraneoplastic and autoimmune panel    Significant Imaging: I have reviewed all pertinent imaging results/findings within the past 24 hours.    MRI C/T/L spine W WO contrast (10/5/23):  Persistent abnormal central cord edema extending from T9 through the tip of the conus with associated increased patchy enhancement and interval development of cauda equina nerve root enhancement, findings that favor sequela of transverse myelitis/demyelinating disease or a post infectious process.  A subacute infarct is felt to be less likely given aforementioned nerve root enhancement.

## 2023-10-09 NOTE — PLAN OF CARE
Problem: Adult Inpatient Plan of Care  Goal: Plan of Care Review  Outcome: Ongoing, Progressing  Flowsheets (Taken 10/9/2023 1806)  Plan of Care Reviewed With: patient     Problem: Skin Injury Risk Increased  Goal: Skin Health and Integrity  Outcome: Ongoing, Progressing  Intervention: Optimize Skin Protection  Flowsheets (Taken 10/9/2023 1806)  Pressure Reduction Techniques:   frequent weight shift encouraged   heels elevated off bed   weight shift assistance provided  Pressure Reduction Devices:   heel offloading device utilized   positioning supports utilized  Skin Protection: tubing/devices free from skin contact  Head of Bed (HOB) Positioning: HOB elevated     Problem: Pain Acute  Goal: Acceptable Pain Control and Functional Ability  Outcome: Ongoing, Progressing  Intervention: Develop Pain Management Plan  Flowsheets (Taken 10/9/2023 1807)  Pain Management Interventions:   care clustered   pain management plan reviewed with patient/caregiver   pillow support provided   position adjusted   quiet environment facilitated   relaxation techniques promoted

## 2023-10-09 NOTE — ASSESSMENT & PLAN NOTE
67 y.o. female with HTN, GERD, depression and tobacco abuse presented 9/25/23 with BLE plegia that began abruptly after lifting her rollator with groceries on 9/18. Says over a matter of 3 hours, she was unable to move her legs at all and had abnormal sensation from waist down including lack of urge to urinate and have a bowel movement. Given rapid progression of symptoms concern for vascular etiology from injury. MRI showed anterior cord involvement in the distal T spine with owl eye sign c/w ischemia. Conus enhancement is unusual and raises the question of concurrent demyelinating polyneuropathy in light of recent colitis.     - No significant change in neuro exam. Now reporting increased paraesthesias in b/l lower extremities  - Currently day 4 of 5 of IV solumedrol after agreeing to extend dose  - Remains interested in inpatient psychology consult due to prior antidepressant usage    Recommendations:  - Continue IV Solumedrol 1 gram qd x 5 days (Day 4 of 5)  - Add GQ1B autoantibody test to labs  - Likely dispo to rehab if remains stable following completion of steroids  - If neurological exam is worsening after evaluation tomorrow will need to consider escalation of therapy such as IVIG vs PLEX  - Consider psych consult for reinitiation of antidepressants   - Pain control per primary team

## 2023-10-10 PROBLEM — F43.29 STRESS AND ADJUSTMENT REACTION: Status: ACTIVE | Noted: 2023-10-10

## 2023-10-10 PROBLEM — Z28.310 UNVACCINATED FOR COVID-19: Status: ACTIVE | Noted: 2023-10-10

## 2023-10-10 LAB
ACE CSF-CCNC: 1.7 U/L (ref 0–2.5)
ALBUMIN SERPL BCP-MCNC: 2.3 G/DL (ref 3.5–5.2)
ALP SERPL-CCNC: 165 U/L (ref 55–135)
ALT SERPL W/O P-5'-P-CCNC: 67 U/L (ref 10–44)
ANION GAP SERPL CALC-SCNC: 4 MMOL/L (ref 8–16)
AST SERPL-CCNC: 34 U/L (ref 10–40)
BACTERIA CSF CULT: NO GROWTH
BASOPHILS # BLD AUTO: 0.01 K/UL (ref 0–0.2)
BASOPHILS NFR BLD: 0.3 % (ref 0–1.9)
BILIRUB SERPL-MCNC: 0.3 MG/DL (ref 0.1–1)
BUN SERPL-MCNC: 18 MG/DL (ref 8–23)
CALCIUM SERPL-MCNC: 8.7 MG/DL (ref 8.7–10.5)
CHLORIDE SERPL-SCNC: 103 MMOL/L (ref 95–110)
CNS DEMYELINATING DISEASE EVAL: NORMAL
CO2 SERPL-SCNC: 28 MMOL/L (ref 23–29)
CREAT SERPL-MCNC: 0.5 MG/DL (ref 0.5–1.4)
DIFFERENTIAL METHOD: ABNORMAL
EOSINOPHIL # BLD AUTO: 0 K/UL (ref 0–0.5)
EOSINOPHIL NFR BLD: 0 % (ref 0–8)
ERYTHROCYTE [DISTWIDTH] IN BLOOD BY AUTOMATED COUNT: 12.7 % (ref 11.5–14.5)
EST. GFR  (NO RACE VARIABLE): >60 ML/MIN/1.73 M^2
GLUCOSE SERPL-MCNC: 191 MG/DL (ref 70–110)
GRAM STN SPEC: NORMAL
HCT VFR BLD AUTO: 32.2 % (ref 37–48.5)
HGB BLD-MCNC: 10.8 G/DL (ref 12–16)
IMM GRANULOCYTES # BLD AUTO: 0.04 K/UL (ref 0–0.04)
IMM GRANULOCYTES NFR BLD AUTO: 1.2 % (ref 0–0.5)
LYMPHOCYTES # BLD AUTO: 0.8 K/UL (ref 1–4.8)
LYMPHOCYTES NFR BLD: 25.2 % (ref 18–48)
MCH RBC QN AUTO: 31.4 PG (ref 27–31)
MCHC RBC AUTO-ENTMCNC: 33.5 G/DL (ref 32–36)
MCV RBC AUTO: 94 FL (ref 82–98)
MOG-IGG1: NEGATIVE
MONOCYTES # BLD AUTO: 0.3 K/UL (ref 0.3–1)
MONOCYTES NFR BLD: 9.9 % (ref 4–15)
NEUTROPHILS # BLD AUTO: 2.1 K/UL (ref 1.8–7.7)
NEUTROPHILS NFR BLD: 63.4 % (ref 38–73)
NMO/AQP4 FACS,S: NEGATIVE
NRBC BLD-RTO: 0 /100 WBC
OLIGOCLONAL BANDS CSF ELPH-IMP: 0 BANDS
OLIGOCLONAL BANDS CSF ELPH-IMP: 2 BANDS
OLIGOCLONAL BANDS SERPL: 2 BANDS
PHOSPHATE SERPL-MCNC: 2.8 MG/DL (ref 2.7–4.5)
PLATELET # BLD AUTO: 212 K/UL (ref 150–450)
PMV BLD AUTO: 9.1 FL (ref 9.2–12.9)
POCT GLUCOSE: 108 MG/DL (ref 70–110)
POCT GLUCOSE: 227 MG/DL (ref 70–110)
POCT GLUCOSE: 279 MG/DL (ref 70–110)
POCT GLUCOSE: 288 MG/DL (ref 70–110)
POTASSIUM SERPL-SCNC: 3.9 MMOL/L (ref 3.5–5.1)
PROT SERPL-MCNC: 5.9 G/DL (ref 6–8.4)
RBC # BLD AUTO: 3.44 M/UL (ref 4–5.4)
SODIUM SERPL-SCNC: 135 MMOL/L (ref 136–145)
WBC # BLD AUTO: 3.33 K/UL (ref 3.9–12.7)

## 2023-10-10 PROCEDURE — 99233 SBSQ HOSP IP/OBS HIGH 50: CPT | Mod: GC,,, | Performed by: HOSPITALIST

## 2023-10-10 PROCEDURE — 63600175 PHARM REV CODE 636 W HCPCS: Performed by: HOSPITALIST

## 2023-10-10 PROCEDURE — 25000003 PHARM REV CODE 250: Performed by: NURSE PRACTITIONER

## 2023-10-10 PROCEDURE — 25000003 PHARM REV CODE 250: Performed by: HOSPITALIST

## 2023-10-10 PROCEDURE — 94761 N-INVAS EAR/PLS OXIMETRY MLT: CPT

## 2023-10-10 PROCEDURE — 99233 PR SUBSEQUENT HOSPITAL CARE,LEVL III: ICD-10-PCS | Mod: GC,,, | Performed by: HOSPITALIST

## 2023-10-10 PROCEDURE — 97112 NEUROMUSCULAR REEDUCATION: CPT

## 2023-10-10 PROCEDURE — 11000001 HC ACUTE MED/SURG PRIVATE ROOM

## 2023-10-10 PROCEDURE — 97530 THERAPEUTIC ACTIVITIES: CPT

## 2023-10-10 PROCEDURE — 99232 PR SUBSEQUENT HOSPITAL CARE,LEVL II: ICD-10-PCS | Mod: ,,, | Performed by: PSYCHIATRY & NEUROLOGY

## 2023-10-10 PROCEDURE — 80053 COMPREHEN METABOLIC PANEL: CPT | Performed by: NURSE PRACTITIONER

## 2023-10-10 PROCEDURE — 84100 ASSAY OF PHOSPHORUS: CPT | Performed by: NURSE PRACTITIONER

## 2023-10-10 PROCEDURE — 85025 COMPLETE CBC W/AUTO DIFF WBC: CPT | Performed by: NURSE PRACTITIONER

## 2023-10-10 PROCEDURE — 27000207 HC ISOLATION

## 2023-10-10 PROCEDURE — 99232 SBSQ HOSP IP/OBS MODERATE 35: CPT | Mod: ,,, | Performed by: PSYCHIATRY & NEUROLOGY

## 2023-10-10 PROCEDURE — 36415 COLL VENOUS BLD VENIPUNCTURE: CPT | Performed by: NURSE PRACTITIONER

## 2023-10-10 RX ORDER — HYDRALAZINE HYDROCHLORIDE 25 MG/1
25 TABLET, FILM COATED ORAL ONCE
Status: COMPLETED | OUTPATIENT
Start: 2023-10-10 | End: 2023-10-10

## 2023-10-10 RX ORDER — HYDRALAZINE HYDROCHLORIDE 25 MG/1
25 TABLET, FILM COATED ORAL EVERY 8 HOURS PRN
Status: DISCONTINUED | OUTPATIENT
Start: 2023-10-10 | End: 2023-10-11 | Stop reason: HOSPADM

## 2023-10-10 RX ORDER — GUAIFENESIN 600 MG/1
1200 TABLET, EXTENDED RELEASE ORAL 2 TIMES DAILY
Status: DISCONTINUED | OUTPATIENT
Start: 2023-10-10 | End: 2023-10-11 | Stop reason: HOSPADM

## 2023-10-10 RX ADMIN — ATORVASTATIN CALCIUM 20 MG: 20 TABLET, FILM COATED ORAL at 08:10

## 2023-10-10 RX ADMIN — GABAPENTIN 800 MG: 400 CAPSULE ORAL at 03:10

## 2023-10-10 RX ADMIN — HYDRALAZINE HYDROCHLORIDE 25 MG: 25 TABLET, FILM COATED ORAL at 04:10

## 2023-10-10 RX ADMIN — FAMOTIDINE 20 MG: 20 TABLET ORAL at 08:10

## 2023-10-10 RX ADMIN — GABAPENTIN 800 MG: 400 CAPSULE ORAL at 08:10

## 2023-10-10 RX ADMIN — OXYCODONE HYDROCHLORIDE 5 MG: 5 TABLET ORAL at 08:10

## 2023-10-10 RX ADMIN — INSULIN ASPART 3 UNITS: 100 INJECTION, SOLUTION INTRAVENOUS; SUBCUTANEOUS at 09:10

## 2023-10-10 RX ADMIN — METHYLPREDNISOLONE SODIUM SUCCINATE 1000 MG: 1 INJECTION INTRAMUSCULAR; INTRAVENOUS at 09:10

## 2023-10-10 RX ADMIN — INSULIN ASPART 6 UNITS: 100 INJECTION, SOLUTION INTRAVENOUS; SUBCUTANEOUS at 04:10

## 2023-10-10 RX ADMIN — GUAIFENESIN 1200 MG: 600 TABLET, EXTENDED RELEASE ORAL at 08:10

## 2023-10-10 RX ADMIN — PANTOPRAZOLE SODIUM 40 MG: 40 TABLET, DELAYED RELEASE ORAL at 08:10

## 2023-10-10 RX ADMIN — INSULIN ASPART 4 UNITS: 100 INJECTION, SOLUTION INTRAVENOUS; SUBCUTANEOUS at 12:10

## 2023-10-10 RX ADMIN — FLUTICASONE PROPIONATE 100 MCG: 50 SPRAY, METERED NASAL at 08:10

## 2023-10-10 RX ADMIN — ENOXAPARIN SODIUM 40 MG: 40 INJECTION SUBCUTANEOUS at 04:10

## 2023-10-10 NOTE — ASSESSMENT & PLAN NOTE
Related to suspected spinal cord injury.   CIC while in ICU, had high bladder residual 10/2 >800 on nursing assessment therefore barber catheter insertion ordered  -maintain barber catheter until more mobile and neurologic work up is completed

## 2023-10-10 NOTE — ASSESSMENT & PLAN NOTE
See HPI  F/U psychology consult  No support system at current time  Needs close f/u with psychology as outpt (could be done via telemedicine)

## 2023-10-10 NOTE — SUBJECTIVE & OBJECTIVE
Interval History: no neurologic improvement.  Day 5/5 of solumedrol. Neuro consult recommends PT/OT and Rehab with close f/u in clinic for further lab results and potential other therapies such as IVIG or PLEX.      Long discussion about 5-10 years of significant life stressors, grief, and PTSD. 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  He  of suicide, and daughter-in-law remarried and moved all grandchildren to [place] with new , so now grandchildren and patient are estranged.  Patient says daughter in law told grandchildren false negative information about her. Patient's mother , and patient has a lot of guilt about not seeing her prior to her death.  Previously on ativan PRN (took sparingly) and SSRIs (unknown details) and she self discontinued meds as she does not like being on meds.   She does not follow with any mental health professionals.     Psychology Dr Flako Ackerman consulted.     Sinus congestion - mucinex started   Hypertensive urgency SBP > 180, no Sx other than fatigue - given hydralazine 25 PO x 1    Review of Systems   All other systems reviewed and are negative.    Objective:     Vital Signs (Most Recent):  Temp: 98.9 °F (37.2 °C) (10/10/23 1556)  Pulse: 71 (10/10/23 155)  Resp: 18 (10/10/23 155)  BP: (!) 188/84 (10/10/23 1616)  SpO2: (!) 92 % (10/10/23 1556) Vital Signs (24h Range):  Temp:  [97.8 °F (36.6 °C)-98.9 °F (37.2 °C)] 98.9 °F (37.2 °C)  Pulse:  [62-76] 71  Resp:  [14-22] 18  SpO2:  [92 %-95 %] 92 %  BP: (134-188)/(68-84) 188/84     Weight: 91.6 kg (201 lb 15.1 oz)  Body mass index is 34.66 kg/m².    Intake/Output Summary (Last 24 hours) at 10/10/2023 1779  Last data filed at 10/10/2023 1627  Gross per 24 hour   Intake 607.56 ml   Output 765 ml   Net -157.44 ml               Physical Exam:  Strength 0/5 B LE  Mood -  tearful, anxious, congruent.  No pressured speech    Significant Labs: All pertinent labs within the past 24 hours have been reviewed.    Significant Imaging: I have reviewed all pertinent imaging results/findings within the past 24 hours.

## 2023-10-10 NOTE — PLAN OF CARE
Problem: Cerebral Tissue Perfusion (Stroke, Hemorrhagic)  Goal: Optimal Cerebral Tissue Perfusion  Outcome: Ongoing, Progressing     Problem: Respiratory Compromise (Stroke, Hemorrhagic)  Goal: Effective Oxygenation and Ventilation  Outcome: Ongoing, Progressing

## 2023-10-10 NOTE — ASSESSMENT & PLAN NOTE
67 y.o. female with HTN, GERD, depression and tobacco abuse presented 9/25/23 with BLE plegia that began abruptly after lifting her rollator with groceries on 9/18. Says over a matter of 3 hours, she was unable to move her legs at all and had abnormal sensation from waist down including lack of urge to urinate and have a bowel movement. Given rapid progression of symptoms concern for vascular etiology from injury. MRI showed anterior cord involvement in the distal T spine with owl eye sign c/w ischemia. Conus enhancement is unusual and raises the question of concurrent demyelinating polyneuropathy in light of recent colitis.     - No significant change in neuro exam today but not worsening. No change in lower extremity strength but with some paraesthesias. Not affecting upper extremity strength or sensation.  - Completes IV solumedrol today for 5 days total of therapy  - No plans for further aggressive therapy at this time given stable, not worsening neurological exam. Should continue PT/OT. Should continue to monitor for any changes in neurological status in future, if there is any changes in future will address additional therapies.    Recommendations:  - Completed IV solumedrol today  - Pending GQ1B autoantibody, CSF NMO, CSF autoimmune eval, will follow up if significant results  - Given stable neurological exam, should focus on intensive PT/OT at this time. Dispo to rehab per their service  - Should arrange close follow up with neurology within 1-2 weeks with Dr. Ware at discharge.  - If neurological exam worsening in future, will consider additional therapy such as IVIG, PLEX  - Consider psych consult for reinitiation of antidepressants   - Pain control per primary team        Neurology will sign off at this time. Will follow up with primary team if pending results are significant. Please reconsult our service if there is worsening changes in neurological status.

## 2023-10-10 NOTE — PLAN OF CARE
[facility] - Neuro[facility] ([facility])  Discharge Reassessment    Per MD team, pt is not medically ready for d/c at this time. Plan is to d/c to rehab when ready.    Pt has been accepted to [facility]. Updated pt/family. Will continue to follow for needs.    Primary Care Provider: Cal Alvarado FNP-C    Expected Discharge Date: 10/10/2023    Reassessment (most recent)       Discharge Reassessment - 10/09/23 1231          Discharge Reassessment    Assessment Type Discharge Planning Reassessment     Did the patient's condition or plan change since previous assessment? No     Discharge Plan discussed with: Patient     Communicated DEMETRICE with patient/caregiver Yes     Discharge Plan A Rehab     Discharge Plan B Skilled Nursing Facility     DME Needed Upon Discharge  other (see comments)   TBD    Why the patient remains in the hospital Requires continued medical care        Post-Acute Status    Post-Acute Authorization Placement     Post-Acute Placement Status Referrals Sent     Coverage MEDICARE - MEDICARE PART A & B     Discharge Delays None known at this time (P)                    Francia Sparks, XOCHITL, CSW    Case Management Department

## 2023-10-10 NOTE — PLAN OF CARE
CHW met with patient/family at bedside. Patient experience rounding completed and reviewed the following.     Do you know your discharge plan? Yes or No,    If yes, what is the plan? (Home, Home Health, Rehab, SNF, LTAC, or Other)  Yes Rehab    Have you discussed your needs and preferences with your SW/CM? Yes or No  Yes    If you are discharging home, do you have help at home? Yes or No  Yes    Do you think you will need help additional at home at discharge? Yes or No  No    Do you currently have difficulty keeping up with bills, affording medicine or buying food? Yes or No No    Assigned SW/CM notified of any patient/family needs or concerns. Appropriate resources provided to address patient's needs.   Francia LUCIANO  Case Management  [phone]

## 2023-10-10 NOTE — ASSESSMENT & PLAN NOTE
Diagnosed at OSH based on upper quadrant abdominal pain and CT abdomen pelvis suggesting colitis. She was started on cipro and flagyl. Has not had any bowel movements since 9/18 despite aggressive bowel regimen at OSH.   CT a/p 9/26 - Large stool ball within the rectum with a suggestion of some rectal wall thickening.  Mild presacral soft tissue thickening/edema.  Findings raise the question of possible proctitis.  Improved symptoms w bowel movement  Off abx by the time she stepped down to , rectal tube in situ -> removed 10/3 once stool output was minimal  Bowel regimen stopped due to diarrhea  Resolved

## 2023-10-10 NOTE — ASSESSMENT & PLAN NOTE
sudden onset bilateral lower extremity paresis, urinary retention and paresthesia that began after heavy lifting on 9/18/23.   OSH MRI cervical and thoracic spine revealed stenosis and T9-L1 central cord edema with mild enhancement. MRI Brain w/wo 9/25: patchy periventricular white matter T2/flair hyperintensities demylination vs microvascular disease  MRI C/T/L spine 9/25: Abnormal cord edema from T9 to conus  MRI spine demyelinating/MRA: possible diffusion restriction in distal thoracic spine/conus, non diagnostic MRA  --Cord findings suspicious for mass vs ischemia vs demyelinating disorder  -serum NMO negative  -Spinal angiogram on 10/2 neg for ischemia  -repeat MRI on 10/5 unremarkable for any new changes; s/p bedside LP by neurology largely unrevealing  - no improvement after 3 doses of steroid so far, continued for 2 more doses per Neurology through today 10/10  - no further Tx (ie IVIG or PLEX) planned per neuro at this time  - PT/OT, close f/u in neuro clinic

## 2023-10-10 NOTE — PROGRESS NOTES
[facility] - Neuro[facility] ([facility])  [facility] Medicine  Progress Note    Patient Name: Rhina Forrest  MRN: [id number]  Patient Class: IP- Inpatient   Admission Date: 9/25/2023  Length of Stay: 15 days  Attending Physician: Sachi Duffy MD  Primary Care Provider: Cal Alvarado FNP-C        Subjective:     Principal Problem:Acute paraplegia        HPI:  Copied from Neuro-crit care team:  66 yo F PMHx GERD, HTN, smoker, depression presenting with bilateral lower extremity plegia that began after heavy lifting on 9/18/23. Pt walks with a walker at baseline and lives at home alone. On 9/18 she was walking home with groceries on her walker and lifted the walker over a curb and hurt her back. Pt was able to walk home and put her groceries away. Once she sat on the couch, she was unable to get back up as her legs stopped moving. She also had decreased sensation from T12 down. Pt called 911 and was taken via EMS to [facility]. MRI cervical and thoracic spine revealed stenosis and T9-L1 cord edema with mild enhancement suspicious for evolving cord infarct vs demyelinating process per radiology report (no imaging was sent with patient). Pt was found to have NSTEMI with troponin 1.1 and was started on ASA and plavix. Pt also c/o bl upper quadrant abdominal pain and CT abdomen pelvis revealed colitis. She was started on cipro and flagyl. Pt has had a barber since 9/18 and has not had any bowel movements since 9/18 despite aggressive bowel regimen at OSH. [place] [facility] [place] and further workup. Pt currently c/o severe abdominal pain, n/v, severe back pain, BUE rash x 1 month, BLE plegia and T12 sensory level.          Overview/Hospital Course:  ICU course - remarkable for bowel decompression with laxatives - rectal tube inserted, improved abdominal pain, discontinuation of all antibiotics, pain control issues (better on current rx). Stepped down to  10/1/23. On 10/2/23, IR  performed spinal angiogram - came back normal. NSGy recommended Neurology consult for LP and signed off. Hernandez catheter inserted the same day for urinary retention.  Repeat MRI is negative for any new or acute findings.  Neurology performed a bedside LP on 10/05/2023 with no significantly elevated WBCs. Hi dose steroids started on 10/6/23.               Interval History: no neurologic improvement.  Day 5/5 of solumedrol. Neuro consult recommends PT/OT and Rehab with close f/u in clinic for further lab results and potential other therapies such as IVIG or PLEX.      Long discussion about 5-10 years of significant life stressors, grief, and PTSD. 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  He  of suicide, and daughter-in-law remarried and moved all grandchildren to [place] with new , so now grandchildren and patient are estranged.  Patient says daughter in law told grandchildren false negative information about her. Patient's mother , and patient has a lot of guilt about not seeing her prior to her death.  Previously on ativan PRN (took sparingly) and SSRIs (unknown details) and she self discontinued meds as she does not like being on meds.   She does not follow with any mental health professionals.     Psychology Dr Flako Ackerman consulted.     Sinus congestion - mucinex started   Hypertensive urgency SBP > 180, no Sx other than fatigue - given hydralazine 25 PO x 1    Review of Systems   All other systems reviewed and are negative.    Objective:     Vital Signs (Most Recent):  Temp: 98.9 °F (37.2 °C) (10/10/23 1556)  Pulse: 71 (10/10/23 155)  Resp: 18 (10/10/23 155)  BP: (!) 188/84 (10/10/23 1616)  SpO2: (!) 92 % (10/10/23 155) Vital Signs (24h Range):  Temp:  [97.8 °F (36.6 °C)-98.9 °F (37.2 °C)] 98.9 °F (37.2 °C)  Pulse:  [62-76] 71  Resp:  [14-22]  18  SpO2:  [92 %-95 %] 92 %  BP: (134-188)/(68-84) 188/84     Weight: 91.6 kg (201 lb 15.1 oz)  Body mass index is 34.66 kg/m².    Intake/Output Summary (Last 24 hours) at 10/10/2023 1745  Last data filed at 10/10/2023 1627  Gross per 24 hour   Intake 607.56 ml   Output 765 ml   Net -157.44 ml               Physical Exam:  Strength 0/5 B LE  Mood - tearful, anxious, congruent.  No pressured speech    Significant Labs: All pertinent labs within the past 24 hours have been reviewed.    Significant Imaging: I have reviewed all pertinent imaging results/findings within the past 24 hours.      Assessment/Plan:      * Acute paraplegia   sudden onset bilateral lower extremity paresis, urinary retention and paresthesia that began after heavy lifting on 9/18/23.   OSH MRI cervical and thoracic spine revealed stenosis and T9-L1 central cord edema with mild enhancement. MRI Brain w/wo 9/25: patchy periventricular white matter T2/flair hyperintensities demylination vs microvascular disease  MRI C/T/L spine 9/25: Abnormal cord edema from T9 to conus  MRI spine demyelinating/MRA: possible diffusion restriction in distal thoracic spine/conus, non diagnostic MRA  --Cord findings suspicious for mass vs ischemia vs demyelinating disorder  -serum NMO negative  -Spinal angiogram on 10/2 neg for ischemia  -repeat MRI on 10/5 unremarkable for any new changes; s/p bedside LP by neurology largely unrevealing  - no improvement after 3 doses of steroid so far, continued for 2 more doses per Neurology through today 10/10  - no further Tx (ie IVIG or PLEX) planned per neuro at this time  - PT/OT, close f/u in neuro clinic      Unvaccinated for covid-19  - enhanced respiratory precautions (masking of all visitors) per patient request      Stress and adjustment reaction  See HPI  F/U psychology consult  No support system at current time  Needs close f/u with psychology as outpt (could be done via telemedicine)      Cough   Benign; neg xr  chest  Trial of mucinex for congestion w post-nasal drip      Urinary retention  Related to suspected spinal cord injury.   CIC while in ICU, had high bladder residual 10/2 >800 on nursing assessment therefore barber catheter insertion ordered  -maintain barber catheter until more mobile and neurologic work up is completed        Debility  PT/OT evaluation - inpatient rehab  Likely to [facility] as soon as tomorrow      Colitis  Diagnosed at OSH based on upper quadrant abdominal pain and CT abdomen pelvis suggesting colitis. She was started on cipro and flagyl. Has not had any bowel movements since 9/18 despite aggressive bowel regimen at OSH.   CT a/p 9/26 - Large stool ball within the rectum with a suggestion of some rectal wall thickening.  Mild presacral soft tissue thickening/edema.  Findings raise the question of possible proctitis.  Improved symptoms w bowel movement  Off abx by the time she stepped down to , rectal tube in situ -> removed 10/3 once stool output was minimal  Bowel regimen stopped due to diarrhea  Resolved        VTE Risk Mitigation (From admission, onward)         Ordered     enoxaparin injection 40 mg  Every 24 hours         10/05/23 0845     IP VTE HIGH RISK PATIENT  Once         09/25/23 1119     Place sequential compression device  Until discontinued         09/25/23 1119     Reason for No Pharmacological VTE Prophylaxis  Once        Question:  Reasons:  Answer:  Risk of Bleeding    09/25/23 1119                Discharge Planning   DEMETRICE: 10/10/2023     Code Status: Full Code   Is the patient medically ready for discharge?: No    Reason for patient still in hospital (select all that apply): Pending disposition  Discharge Plan A: Rehab   Discharge Delays: None known at this time              Sachi Duffy MD  Department of Hospital Medicine   [facility] - Neurosurgery ([facility])

## 2023-10-10 NOTE — PROGRESS NOTES
"[facility] - Neurosurgery ([facility])  Adult Nutrition  Progress Note    SUMMARY       Recommendations    1. Continue current regular diet.  2. If PO intake <50%, add Boost Plus BID.  3. RD following.    Goals: Will meet % EEN/EPN by next RD f/u.  Nutrition Goal Status: goal met  Communication of RD Recs:  (POC)    Assessment and Plan    No nutrition dxn at this time- RD to monitor PO intake and wt trends.    Reason for Assessment    Reason For Assessment: RD follow-up  Diagnosis:  (Spinal cord edema spanning T9-L1)  Relevant Medical History: NSTEMI, GERD, colitis  Interdisciplinary Rounds: did not attend  General Information Comments: RD f/u. Current PO intake varies from %. No issues with n/v/d/c/chewing/swallowing. No wt hx per chart review; CBW remains 201#. No s/s of malnutrition, appears well-nourished. LBM 10/10.   Nutrition Discharge Planning: Pending clinical course    Nutrition Risk Screen    Nutrition Risk Screen: no indicators present    Nutrition/Diet History    Spiritual, Cultural Beliefs, Congregation Practices, Values that Affect Care: no  Food Allergies: other (see comments) (Latex)  Factors Affecting Nutritional Intake: None identified at this time    Anthropometrics    Temp: 97.8 °F (36.6 °C)  Height Method: Measured  Height: 5' 4" (162.6 cm)  Height (inches): 64 in  Weight Method: Bed Scale  Weight: 91.6 kg (201 lb 15.1 oz)  Weight (lb): 201.94 lb  Ideal Body Weight (IBW), Female: 120 lb  % Ideal Body Weight, Female (lb): 168.28 %  BMI (Calculated): 34.6  BMI Grade: 30 - 34.9- obesity - grade I    Lab/Procedures/Meds    Pertinent Labs Reviewed: reviewed  Pertinent Labs Comments: Sodium 135, Glucose 191, , Albumin 2.3, ALT 67, A1C 5.9%  Pertinent Medications Reviewed: reviewed  Pertinent Medications Comments: atorvastatin, enoxaparin, famotidine, gabapentin, pantoprazole    Estimated/Assessed Needs    Weight Used For Calorie Calculations: 91.6 kg (201 lb 15.1 oz)  Energy Calorie " Requirements (kcal): 1431 kcals  Energy Need Method: [place] (MSJ x 1.0 PAL)  Protein Requirements: 64-73 g (0.7-0.8 g/kg)  Weight Used For Protein Calculations: 91.6 kg (201 lb 15.1 oz)  Fluid Requirements (mL): 1 mL/kcal or fluid per MD  Estimated Fluid Requirement Method: RDA Method  RDA Method (mL): 1431    Nutrition Prescription Ordered    Current Diet Order: Regular    Evaluation of Received Nutrient/Fluid Intake    I/O: -7.5 L since admit  Energy Calories Required: meeting needs  Protein Required: meeting needs  Tolerance: tolerating  % Intake of Estimated Energy Needs: 75 - 100 %  % Meal Intake: Other: varies from %    Nutrition Risk    Level of Risk/Frequency of Follow-up:  (1 time/week)     Monitor and Evaluation    Food and Nutrient Intake: energy intake, food and beverage intake, enteral nutrition intake  Food and Nutrient Adminstration: diet order, enteral and parenteral nutrition administration  Knowledge/Beliefs/Attitudes: beliefs and attitudes, food and nutrition knowledge/skill  Physical Activity and Function: nutrition-related ADLs and IADLs  Anthropometric Measurements: body mass index, weight change, weight, height/length  Biochemical Data, Medical Tests and Procedures: lipid profile, inflammatory profile, glucose/endocrine profile, gastrointestinal profile, electrolyte and renal panel  Nutrition-Focused Physical Findings: overall appearance     Nutrition Follow-Up    RD Follow-up?: Yes    Emilie Colon RDN,LDN

## 2023-10-10 NOTE — PLAN OF CARE
SW informed by MD that pt is on Day 5 of 5 of solumedrol. Per MD, neuro team to determine if pt will need IVIG vs PLEX treatment. DEMETRICE will depend on neuro treatment. Will continue to follow for needs.    XOCHITL Peralta, CSW    Case Management Department

## 2023-10-10 NOTE — PT/OT/SLP PROGRESS
Physical Therapy Treatment    Patient Name:  Rhina Forrest   MRN:  [id number]  Rehab technician utilized to assist w/ treatment session 2/2 pt requiring two person assist for functional mobility   Recommendations:     Discharge Recommendations: rehabilitation facility  Discharge Equipment Recommendations: to be determined by next level of care  Barriers to discharge: Inaccessible home and Decreased caregiver support    Assessment:     Rhina Forrest is a 67 y.o. female admitted with a medical diagnosis of Acute paraplegia.  She presents with the following impairments/functional limitations: weakness, impaired endurance, impaired sensation, impaired self care skills, impaired functional mobility, gait instability, impaired balance, decreased coordination, decreased upper extremity function, decreased lower extremity function, decreased safety awareness, abnormal tone, impaired coordination, impaired fine motor, decreased ROM, pain. Pt progressing well, previously required maxA for bed mobility, now only requiring modA.     Rehab Prognosis: Good; patient would benefit from acute skilled PT services to address these deficits and reach maximum level of function.    Recent Surgery: * No surgery found *      Plan:     During this hospitalization, patient to be seen 4 x/week to address the identified rehab impairments via therapeutic activities, therapeutic exercises, neuromuscular re-education, wheelchair management/training and progress toward the following goals:    Plan of Care Expires:  11/03/23    Subjective     Chief Complaint: back pain and upset w/ continued inability to move legs  Patient/Family Comments/goals: pt wants to progress back to walking; tearful and reporting she keeps feeling as if she's doing something wrong and that's why her leg strength hasn't returned  Pain/Comfort:  Pain Rating 1: 6/10  Location - Orientation 1: generalized  Location 1: back  Pain Addressed 1: Reposition, Distraction, Nurse  notified  Pain Rating Post-Intervention 1: 5/10      Objective:     Communicated with RN prior to session.  Patient found HOB elevated with SCD, telemetry, barber catheter upon PT entry to room.     General Precautions: Standard, fall  Orthopedic Precautions: N/A  Braces: N/A  Respiratory Status: Room air     Functional Mobility:  Bed Mobility:     Rolling Left:  moderate assistance  Rolling Right: moderate assistance  Scooting: moderate assistance  Supine to Sit: moderate assistance  Sit to Supine: moderate assistance  Balance: EOB sitting SBA w/ BUE support  EOB w/ RUE support CGA 30 sec x3  EOB w/ LUE support CGA 10 sec x6  EOB w/out UE support maxA      AM-PAC 6 CLICK MOBILITY  Turning over in bed (including adjusting bedclothes, sheets and blankets)?: 2  Sitting down on and standing up from a chair with arms (e.g., wheelchair, bedside commode, etc.): 1  Moving from lying on back to sitting on the side of the bed?: 2  Moving to and from a bed to a chair (including a wheelchair)?: 1  Need to walk in hospital room?: 1  Climbing 3-5 steps with a railing?: 1  Basic Mobility Total Score: 8       Treatment & Education:  Pt educated on PT POC/goals, d/c recs, and continued treatment. All questions answered and pt in agreement w/ POC.  Rolling in bed to change linens and perform pericare total A  Bed mobility w/ increased time and cueing for positioning of BUE  Sitting balance w/ practice using unilateral UE support, no UE support, and maintaining midline w/ BUE on bed rather than railings  BLE PROM in bed w/ stretching to B gastrocs and hamstrings  Rolling L/R in bed to reposition pt w/ wedges and pillows to offload pressure, avoid muscle contractures, and prevent skin breakdown. Pt educated on importance of pt being rotated every 2hrs for these reasons.     Patient left HOB elevated with all lines intact, call button in reach, bed alarm on, and RN notified..    GOALS:   Multidisciplinary Problems       Physical Therapy  Goals          Problem: Physical Therapy    Goal Priority Disciplines Outcome Goal Variances Interventions   Physical Therapy Goal     PT, PT/OT Ongoing, Progressing     Description: Goals to be completed by: 11/3/23    Pt will perform sup<>sit transfers w/ minimum assistance  Pt will have sufficient dynamic balance to sit EOB w/ stand by assistance for 5min  Pt will be able to transfer from bed to w/c w/ modA using slideboard  Pt will be independent w/ HEP therex on BLE w/ good form and ROM   Pt will be independent w/ propelling w/c 100'                       Time Tracking:     PT Received On: 10/10/23  PT Start Time: 1406     PT Stop Time: 1434  PT Total Time (min): 28 min     Billable Minutes: Therapeutic Activity 15 and Neuromuscular Re-education 13    Treatment Type: Treatment  PT/PTA: PT     Number of PTA visits since last PT visit: 0     10/10/2023

## 2023-10-10 NOTE — PROGRESS NOTES
[facility] - Neuro[facility] ([facility])  Neurology  Progress Note    Patient Name: Rhina Forrest  MRN: [id number]  Admission Date: 9/25/2023  Hospital Length of Stay: 15 days  Code Status: Full Code   Attending Provider: Sachi Duffy MD  Primary Care Physician: Cal Alvarado, FRANCI   Principal Problem:Acute paraplegia    HPI:   67 y.o. female with HTN, GERD, depression and tobacco abuse presented 9/25/23 with BLE plegia that began abruptly after lifting her rollator with groceries. Pt reports using a rollator x 3 years after a L knee injury (ACL, meniscus). She walked to [employer] to get groceries and tried to lift her walker and felt a pop. She knew she injured something in her back. She was able to make it home and walk to the bathroom, felt weak getting off the toilet, but was able to make it to her couch. Once lying down on the sofa, she was unable to get back up and called 911. Says over a matter of 3 hours, she was unable to move her legs at all and had abnormal sensation from waist down including lack of urge to urinate and have a bowel movement. She was brought to [facility] and underwent MRI neuro-axis showing T9-L1 central cord edema. CT Ab/pelvis also showed colitis and she was treated with cipro and flagyl. She was transferred to [facility] for higher level of care/NSGY evaluation. Underwent repeating imaging of neuro-axis and spinal angiogram (10/2) that was unremarkable. Neurology consulted 10/4 for evaluation of transverse myelitis and consideration for LP. Pt still with ongoing BLE plegia, urinary retention s/p barber catheter.           Overview/Hospital Course:  No notes on file        Subjective:     Interval History:   No acute events overnight. Neurological exam showing minimal change but not worsening at this time. Completing steroid therapy today. Further CSF and serum studies returning without significant findings at this time. PT/OT recommending rehab.     Current Facility-Administered  Medications   Medication Dose Route Frequency Provider Last Rate Last Admin    atorvastatin tablet 20 mg  20 mg Oral Daily Jud Aguilar, NP   20 mg at 10/09/23 0820    dextrose 10% bolus 125 mL 125 mL  12.5 g Intravenous PRN James Baker MD        dextrose 10% bolus 250 mL 250 mL  25 g Intravenous PRN James Baker MD        diazePAM tablet 5 mg  5 mg Oral Q4H PRN Jud Aguilar, NP   5 mg at 10/04/23 0540    enoxaparin injection 40 mg  40 mg Subcutaneous Q24H (prophylaxis, 1700) James Baker MD   40 mg at 10/09/23 1638    famotidine tablet 20 mg  20 mg Oral BID Clemencia Guerrero NP   20 mg at 10/09/23 2146    fluticasone propionate 50 mcg/actuation nasal spray 100 mcg  2 spray Each Nostril Daily James Baker MD   100 mcg at 10/09/23 0820    gabapentin capsule 800 mg  800 mg Oral TID Jud Aguilar NP   800 mg at 10/09/23 2146    glucagon (human recombinant) injection 1 mg  1 mg Intramuscular PRN James Baker MD        glucose chewable tablet 16 g  16 g Oral PRN James Baker MD        glucose chewable tablet 24 g  24 g Oral PRN James Baker MD        guaiFENesin 100 mg/5 ml syrup 200 mg  200 mg Oral Q6H PRN Tenzin Ruiz MD   200 mg at 10/08/23 0512    insulin aspart U-100 pen 0-10 Units  0-10 Units Subcutaneous QID (AC + HS) PRN James Baker MD   3 Units at 10/09/23 2146    methylPREDNISolone sodium succinate (SOLU-MEDROL) 1,000 mg in dextrose 5 % (D5W) 400 mL IV syringe (conc 2.5 mg/mL)  1,000 mg Intravenous Daily James Baker MD   Stopped at 10/09/23 0848    ondansetron injection 4 mg  4 mg Intravenous Q8H PRN Jud Aguilar, NP   4 mg at 10/08/23 0512    oxyCODONE immediate release tablet 5 mg  5 mg Oral Q4H PRN Jud Aguilar NP   5 mg at 10/09/23 0617    pantoprazole EC tablet 40 mg  40 mg Oral Daily James Baker MD   40 mg at 10/09/23 0820    polyethylene glycol packet 17 g  17 g Oral Daily Clemencia Guerrero NP   17 g  at 10/06/23 0806    simethicone chewable tablet 80 mg  1 tablet Oral TID PRN MicorbinJud, NP   80 mg at 10/03/23 1326    sodium chloride 0.9% flush 10 mL  10 mL Intravenous PRN Tenzin Verdin MD        sodium chloride 0.9% flush 10 mL  10 mL Intravenous PRN Ronnie Gardner MD           Review of Systems   Constitutional:  Positive for activity change and fatigue. Negative for fever.   HENT:  Negative for trouble swallowing and voice change.    Eyes:  Negative for visual disturbance.   Respiratory:  Negative for shortness of breath.    Cardiovascular:  Negative for chest pain and leg swelling.   Gastrointestinal:  Negative for nausea and vomiting.   Genitourinary:  Positive for difficulty urinating.   Musculoskeletal:  Positive for back pain and gait problem. Negative for neck stiffness.   Neurological:  Positive for weakness and numbness. Negative for tremors, seizures, syncope, facial asymmetry and speech difficulty.   Psychiatric/Behavioral:  Positive for dysphoric mood. Negative for confusion. The patient is nervous/anxious.      Objective:     Vital Signs (Most Recent):  Temp: 98 °F (36.7 °C) (10/10/23 0826)  Pulse: 62 (10/10/23 0826)  Resp: (!) 22 (10/10/23 0826)  BP: (!) 157/68 (10/10/23 0826)  SpO2: (!) 93 % (10/10/23 0826) Vital Signs (24h Range):  Temp:  [97.8 °F (36.6 °C)-99 °F (37.2 °C)] 98 °F (36.7 °C)  Pulse:  [62-85] 62  Resp:  [14-22] 22  SpO2:  [91 %-95 %] 93 %  BP: (146-185)/(67-91) 157/68     Weight: 91.6 kg (201 lb 15.1 oz)  Body mass index is 34.66 kg/m².     Physical Exam  Eyes:      Extraocular Movements: EOM normal.      Pupils: Pupils are equal, round, and reactive to light.   Neurological:      Mental Status: She is oriented to person, place, and time.      Coordination: Finger-Nose-Finger Test normal.      Deep Tendon Reflexes:      Reflex Scores:       Bicep reflexes are 2+ on the right side and 2+ on the left side.       Brachioradialis reflexes are 2+ on the right side and 2+  on the left side.       Patellar reflexes are 0 on the right side and 0 on the left side.       Achilles reflexes are 0 on the right side and 0 on the left side.  Psychiatric:         Mood and Affect: Mood is anxious.         Speech: Speech is rapid and pressured.         Behavior: Behavior normal. Behavior is cooperative.          NEUROLOGICAL EXAMINATION:     MENTAL STATUS   Oriented to person, place, and time.     CRANIAL NERVES     CN III, IV, VI   Pupils are equal, round, and reactive to light.  Extraocular motions are normal.     REFLEXES     Reflexes   Right brachioradialis: 2+  Left brachioradialis: 2+  Right biceps: 2+  Left biceps: 2+  Right patellar: 0  Left patellar: 0  Right achilles: 0  Left achilles: 0    GAIT AND COORDINATION      Coordination   Finger to nose coordination: normal      Significant Labs: All pertinent lab results from the past 24 hours have been reviewed.    CSF: Protein 41, glucose 94, cell count clear 1 WBC, 0 % lymph  NMO negative, ACE negative, MS negative, MOG negative. Viral workup negative  Serum studies negative for CNS demyelinating disease, ACE    Pending:  CSF autoimmune panel  Serum GQ1B autoantibody    Significant Imaging: I have reviewed all pertinent imaging results/findings within the past 24 hours.    MRI C/T/L spine W WO contrast (10/5/23):  Persistent abnormal central cord edema extending from T9 through the tip of the conus with associated increased patchy enhancement and interval development of cauda equina nerve root enhancement, findings that favor sequela of transverse myelitis/demyelinating disease or a post infectious process.  A subacute infarct is felt to be less likely given aforementioned nerve root enhancement.    Assessment and Plan:     * Acute paraplegia  67 y.o. female with HTN, GERD, depression and tobacco abuse presented 9/25/23 with BLE plegia that began abruptly after lifting her rollator with groceries on 9/18. Says over a matter of 3  hours, she was unable to move her legs at all and had abnormal sensation from waist down including lack of urge to urinate and have a bowel movement. Given rapid progression of symptoms concern for vascular etiology from injury. MRI showed anterior cord involvement in the distal T spine with owl eye sign c/w ischemia. Conus enhancement is unusual and raises the question of concurrent demyelinating polyneuropathy in light of recent colitis.     - No significant change in neuro exam today but not worsening. No change in lower extremity strength but with some paraesthesias. Not affecting upper extremity strength or sensation.  - Completes IV solumedrol today for 5 days total of therapy  - No plans for further aggressive therapy at this time given stable, not worsening neurological exam. Should continue PT/OT. Should continue to monitor for any changes in neurological status in future, if there is any changes in future will address additional therapies.    Recommendations:  - Completed IV solumedrol today  - Pending GQ1B autoantibody, CSF NMO, CSF autoimmune eval, will follow up if significant results  - Given stable neurological exam, should focus on intensive PT/OT at this time. Dispo to rehab per their service  - Should arrange close follow up with neurology within 1-2 weeks with Dr. Ware at discharge.  - If neurological exam worsening in future, will consider additional therapy such as IVIG, PLEX  - Consider psych consult for reinitiation of antidepressants   - Pain control per primary team        Neurology will sign off at this time. Will follow up with primary team if pending results are significant. Please reconsult our service if there is worsening changes in neurological status.        VTE Risk Mitigation (From admission, onward)         Ordered     enoxaparin injection 40 mg  Every 24 hours         10/05/23 0845     IP VTE HIGH RISK PATIENT  Once         09/25/23 1119     Place sequential compression device   Until discontinued         09/25/23 1119     Reason for No Pharmacological VTE Prophylaxis  Once        Question:  Reasons:  Answer:  Risk of Bleeding    09/25/23 1119                Jaison Vargas MD PGY-III  Neurology  [facility] - Neurosurgery ([facility])

## 2023-10-10 NOTE — SUBJECTIVE & OBJECTIVE
Subjective:     Interval History:   No acute events overnight. Neurological exam showing minimal change but not worsening at this time. Completing steroid therapy today. Further CSF and serum studies returning without significant findings at this time. PT/OT recommending rehab.     Current Facility-Administered Medications   Medication Dose Route Frequency Provider Last Rate Last Admin    atorvastatin tablet 20 mg  20 mg Oral Daily MiSilvino alanizana, NP   20 mg at 10/09/23 0820    dextrose 10% bolus 125 mL 125 mL  12.5 g Intravenous PRN James Baker MD        dextrose 10% bolus 250 mL 250 mL  25 g Intravenous PRN James Baker MD        diazePAM tablet 5 mg  5 mg Oral Q4H PRN Jud Aguilar, NP   5 mg at 10/04/23 0540    enoxaparin injection 40 mg  40 mg Subcutaneous Q24H (prophylaxis, 1700) James Baker MD   40 mg at 10/09/23 1638    famotidine tablet 20 mg  20 mg Oral BID Clemencia Guerrero NP   20 mg at 10/09/23 2146    fluticasone propionate 50 mcg/actuation nasal spray 100 mcg  2 spray Each Nostril Daily James Baker MD   100 mcg at 10/09/23 0820    gabapentin capsule 800 mg  800 mg Oral TID Jud Aguilar, NP   800 mg at 10/09/23 2146    glucagon (human recombinant) injection 1 mg  1 mg Intramuscular PRN James Baker MD        glucose chewable tablet 16 g  16 g Oral PRN James Baker MD        glucose chewable tablet 24 g  24 g Oral PRN James Baker MD        guaiFENesin 100 mg/5 ml syrup 200 mg  200 mg Oral Q6H PRN Tenzin Ruiz MD   200 mg at 10/08/23 0512    insulin aspart U-100 pen 0-10 Units  0-10 Units Subcutaneous QID (AC + HS) PRN James Baker MD   3 Units at 10/09/23 2146    methylPREDNISolone sodium succinate (SOLU-MEDROL) 1,000 mg in dextrose 5 % (D5W) 400 mL IV syringe (conc 2.5 mg/mL)  1,000 mg Intravenous Daily James Baker MD   Stopped at 10/09/23 0848    ondansetron injection 4 mg  4 mg Intravenous Q8H PRN Jud Aguilar NP   4 mg  at 10/08/23 0512    oxyCODONE immediate release tablet 5 mg  5 mg Oral Q4H PRN Lauren NAYAN Renner   5 mg at 10/09/23 0617    pantoprazole EC tablet 40 mg  40 mg Oral Daily James Baker MD   40 mg at 10/09/23 0820    polyethylene glycol packet 17 g  17 g Oral Daily Clemencia Guerrero NP   17 g at 10/06/23 0806    simethicone chewable tablet 80 mg  1 tablet Oral TID PRN Jud Aguilar NP   80 mg at 10/03/23 1326    sodium chloride 0.9% flush 10 mL  10 mL Intravenous PRN Tenzin Verdin MD        sodium chloride 0.9% flush 10 mL  10 mL Intravenous PRN Ronnie Gardner MD           Review of Systems   Constitutional:  Positive for activity change and fatigue. Negative for fever.   HENT:  Negative for trouble swallowing and voice change.    Eyes:  Negative for visual disturbance.   Respiratory:  Negative for shortness of breath.    Cardiovascular:  Negative for chest pain and leg swelling.   Gastrointestinal:  Negative for nausea and vomiting.   Genitourinary:  Positive for difficulty urinating.   Musculoskeletal:  Positive for back pain and gait problem. Negative for neck stiffness.   Neurological:  Positive for weakness and numbness. Negative for tremors, seizures, syncope, facial asymmetry and speech difficulty.   Psychiatric/Behavioral:  Positive for dysphoric mood. Negative for confusion. The patient is nervous/anxious.      Objective:     Vital Signs (Most Recent):  Temp: 98 °F (36.7 °C) (10/10/23 0826)  Pulse: 62 (10/10/23 0826)  Resp: (!) 22 (10/10/23 0826)  BP: (!) 157/68 (10/10/23 0826)  SpO2: (!) 93 % (10/10/23 0826) Vital Signs (24h Range):  Temp:  [97.8 °F (36.6 °C)-99 °F (37.2 °C)] 98 °F (36.7 °C)  Pulse:  [62-85] 62  Resp:  [14-22] 22  SpO2:  [91 %-95 %] 93 %  BP: (146-185)/(67-91) 157/68     Weight: 91.6 kg (201 lb 15.1 oz)  Body mass index is 34.66 kg/m².     Physical Exam  Eyes:      Extraocular Movements: EOM normal.      Pupils: Pupils are equal, round, and reactive to light.   Neurological:       Mental Status: She is oriented to person, place, and time.      Coordination: Finger-Nose-Finger Test normal.      Deep Tendon Reflexes:      Reflex Scores:       Bicep reflexes are 2+ on the right side and 2+ on the left side.       Brachioradialis reflexes are 2+ on the right side and 2+ on the left side.       Patellar reflexes are 0 on the right side and 0 on the left side.       Achilles reflexes are 0 on the right side and 0 on the left side.  Psychiatric:         Mood and Affect: Mood is anxious.         Speech: Speech is rapid and pressured.         Behavior: Behavior normal. Behavior is cooperative.          NEUROLOGICAL EXAMINATION:     MENTAL STATUS   Oriented to person, place, and time.     CRANIAL NERVES     CN III, IV, VI   Pupils are equal, round, and reactive to light.  Extraocular motions are normal.     REFLEXES     Reflexes   Right brachioradialis: 2+  Left brachioradialis: 2+  Right biceps: 2+  Left biceps: 2+  Right patellar: 0  Left patellar: 0  Right achilles: 0  Left achilles: 0    GAIT AND COORDINATION      Coordination   Finger to nose coordination: normal      Significant Labs: All pertinent lab results from the past 24 hours have been reviewed.    CSF: Protein 41, glucose 94, cell count clear 1 WBC, 0 % lymph  NMO negative, ACE negative, MS negative, MOG negative. Viral workup negative  Serum studies negative for CNS demyelinating disease, ACE    Pending:  CSF autoimmune panel  Serum GQ1B autoantibody    Significant Imaging: I have reviewed all pertinent imaging results/findings within the past 24 hours.    MRI C/T/L spine W WO contrast (10/5/23):  Persistent abnormal central cord edema extending from T9 through the tip of the conus with associated increased patchy enhancement and interval development of cauda equina nerve root enhancement, findings that favor sequela of transverse myelitis/demyelinating disease or a post infectious process.  A subacute infarct is felt to be less  likely given aforementioned nerve root enhancement.

## 2023-10-11 VITALS
WEIGHT: 201.94 LBS | OXYGEN SATURATION: 93 % | TEMPERATURE: 98 F | RESPIRATION RATE: 20 BRPM | DIASTOLIC BLOOD PRESSURE: 67 MMHG | SYSTOLIC BLOOD PRESSURE: 156 MMHG | HEART RATE: 74 BPM | HEIGHT: 64 IN | BODY MASS INDEX: 34.48 KG/M2

## 2023-10-11 PROBLEM — E87.6 HYPOKALEMIA: Status: ACTIVE | Noted: 2023-10-11

## 2023-10-11 LAB
ALBUMIN SERPL BCP-MCNC: 2.3 G/DL (ref 3.5–5.2)
ALP SERPL-CCNC: 167 U/L (ref 55–135)
ALT SERPL W/O P-5'-P-CCNC: 72 U/L (ref 10–44)
ANION GAP SERPL CALC-SCNC: 7 MMOL/L (ref 8–16)
AST SERPL-CCNC: 32 U/L (ref 10–40)
BASOPHILS # BLD AUTO: 0.01 K/UL (ref 0–0.2)
BASOPHILS NFR BLD: 0.2 % (ref 0–1.9)
BILIRUB SERPL-MCNC: 0.3 MG/DL (ref 0.1–1)
BUN SERPL-MCNC: 18 MG/DL (ref 8–23)
CALCIUM SERPL-MCNC: 8.5 MG/DL (ref 8.7–10.5)
CHLORIDE SERPL-SCNC: 106 MMOL/L (ref 95–110)
CO2 SERPL-SCNC: 25 MMOL/L (ref 23–29)
CREAT SERPL-MCNC: 0.5 MG/DL (ref 0.5–1.4)
DIFFERENTIAL METHOD: ABNORMAL
EOSINOPHIL # BLD AUTO: 0 K/UL (ref 0–0.5)
EOSINOPHIL NFR BLD: 0 % (ref 0–8)
ERYTHROCYTE [DISTWIDTH] IN BLOOD BY AUTOMATED COUNT: 12.6 % (ref 11.5–14.5)
EST. GFR  (NO RACE VARIABLE): >60 ML/MIN/1.73 M^2
GLUCOSE SERPL-MCNC: 172 MG/DL (ref 70–110)
HCT VFR BLD AUTO: 33.4 % (ref 37–48.5)
HGB BLD-MCNC: 11 G/DL (ref 12–16)
IMM GRANULOCYTES # BLD AUTO: 0.05 K/UL (ref 0–0.04)
IMM GRANULOCYTES NFR BLD AUTO: 1.1 % (ref 0–0.5)
LYMPHOCYTES # BLD AUTO: 1.1 K/UL (ref 1–4.8)
LYMPHOCYTES NFR BLD: 25.6 % (ref 18–48)
MCH RBC QN AUTO: 31.2 PG (ref 27–31)
MCHC RBC AUTO-ENTMCNC: 32.9 G/DL (ref 32–36)
MCV RBC AUTO: 95 FL (ref 82–98)
MONOCYTES # BLD AUTO: 0.4 K/UL (ref 0.3–1)
MONOCYTES NFR BLD: 8.5 % (ref 4–15)
NEUTROPHILS # BLD AUTO: 2.8 K/UL (ref 1.8–7.7)
NEUTROPHILS NFR BLD: 64.6 % (ref 38–73)
NMO/AQP4 FACS,CSF: NEGATIVE
NRBC BLD-RTO: 0 /100 WBC
PHOSPHATE SERPL-MCNC: 3 MG/DL (ref 2.7–4.5)
PLATELET # BLD AUTO: 218 K/UL (ref 150–450)
PMV BLD AUTO: 8.9 FL (ref 9.2–12.9)
POCT GLUCOSE: 123 MG/DL (ref 70–110)
POCT GLUCOSE: 83 MG/DL (ref 70–110)
POTASSIUM SERPL-SCNC: 3.4 MMOL/L (ref 3.5–5.1)
PROT SERPL-MCNC: 5.8 G/DL (ref 6–8.4)
RBC # BLD AUTO: 3.53 M/UL (ref 4–5.4)
SODIUM SERPL-SCNC: 138 MMOL/L (ref 136–145)
WBC # BLD AUTO: 4.37 K/UL (ref 3.9–12.7)

## 2023-10-11 PROCEDURE — 25000003 PHARM REV CODE 250: Performed by: HOSPITALIST

## 2023-10-11 PROCEDURE — 36415 COLL VENOUS BLD VENIPUNCTURE: CPT | Performed by: NURSE PRACTITIONER

## 2023-10-11 PROCEDURE — 80053 COMPREHEN METABOLIC PANEL: CPT | Performed by: NURSE PRACTITIONER

## 2023-10-11 PROCEDURE — 99239 PR HOSPITAL DISCHARGE DAY,>30 MIN: ICD-10-PCS | Mod: ,,, | Performed by: HOSPITALIST

## 2023-10-11 PROCEDURE — 25000003 PHARM REV CODE 250: Performed by: NURSE PRACTITIONER

## 2023-10-11 PROCEDURE — 99239 HOSP IP/OBS DSCHRG MGMT >30: CPT | Mod: ,,, | Performed by: HOSPITALIST

## 2023-10-11 PROCEDURE — 85025 COMPLETE CBC W/AUTO DIFF WBC: CPT | Performed by: NURSE PRACTITIONER

## 2023-10-11 PROCEDURE — 84100 ASSAY OF PHOSPHORUS: CPT | Performed by: NURSE PRACTITIONER

## 2023-10-11 RX ORDER — FLUTICASONE PROPIONATE 50 MCG
2 SPRAY, SUSPENSION (ML) NASAL DAILY
Refills: 0
Start: 2023-10-12

## 2023-10-11 RX ORDER — GABAPENTIN 400 MG/1
800 CAPSULE ORAL 3 TIMES DAILY
Qty: 180 CAPSULE | Refills: 11
Start: 2023-10-11 | End: 2024-10-10

## 2023-10-11 RX ORDER — POTASSIUM CHLORIDE 20 MEQ/1
40 TABLET, EXTENDED RELEASE ORAL ONCE
Status: COMPLETED | OUTPATIENT
Start: 2023-10-11 | End: 2023-10-11

## 2023-10-11 RX ORDER — ATORVASTATIN CALCIUM 20 MG/1
20 TABLET, FILM COATED ORAL DAILY
Qty: 90 TABLET | Refills: 3 | Status: SHIPPED | OUTPATIENT
Start: 2023-10-12 | End: 2024-10-11

## 2023-10-11 RX ORDER — POLYETHYLENE GLYCOL 3350 17 G/17G
17 POWDER, FOR SOLUTION ORAL DAILY
Refills: 0
Start: 2023-10-12 | End: 2023-10-11 | Stop reason: SDUPTHER

## 2023-10-11 RX ORDER — GUAIFENESIN 600 MG/1
1200 TABLET, EXTENDED RELEASE ORAL 2 TIMES DAILY
Start: 2023-10-11

## 2023-10-11 RX ORDER — HYDRALAZINE HYDROCHLORIDE 25 MG/1
25 TABLET, FILM COATED ORAL EVERY 8 HOURS PRN
Start: 2023-10-11 | End: 2024-10-10

## 2023-10-11 RX ORDER — PANTOPRAZOLE SODIUM 40 MG/1
40 TABLET, DELAYED RELEASE ORAL DAILY
Qty: 30 TABLET | Refills: 11
Start: 2023-10-12 | End: 2024-10-11

## 2023-10-11 RX ORDER — POLYETHYLENE GLYCOL 3350 17 G/17G
17 POWDER, FOR SOLUTION ORAL DAILY PRN
Refills: 0
Start: 2023-10-11

## 2023-10-11 RX ORDER — OXYCODONE HYDROCHLORIDE 5 MG/1
5 TABLET ORAL EVERY 4 HOURS PRN
Qty: 10 TABLET | Refills: 0 | Status: SHIPPED | OUTPATIENT
Start: 2023-10-11

## 2023-10-11 RX ADMIN — POTASSIUM CHLORIDE 40 MEQ: 1500 TABLET, EXTENDED RELEASE ORAL at 10:10

## 2023-10-11 RX ADMIN — GABAPENTIN 800 MG: 400 CAPSULE ORAL at 09:10

## 2023-10-11 RX ADMIN — OXYCODONE HYDROCHLORIDE 5 MG: 5 TABLET ORAL at 02:10

## 2023-10-11 RX ADMIN — FLUTICASONE PROPIONATE 100 MCG: 50 SPRAY, METERED NASAL at 09:10

## 2023-10-11 RX ADMIN — OXYCODONE HYDROCHLORIDE 5 MG: 5 TABLET ORAL at 09:10

## 2023-10-11 RX ADMIN — FAMOTIDINE 20 MG: 20 TABLET ORAL at 09:10

## 2023-10-11 RX ADMIN — ATORVASTATIN CALCIUM 20 MG: 20 TABLET, FILM COATED ORAL at 09:10

## 2023-10-11 RX ADMIN — GABAPENTIN 800 MG: 400 CAPSULE ORAL at 02:10

## 2023-10-11 RX ADMIN — GUAIFENESIN 1200 MG: 600 TABLET, EXTENDED RELEASE ORAL at 09:10

## 2023-10-11 RX ADMIN — PANTOPRAZOLE SODIUM 40 MG: 40 TABLET, DELAYED RELEASE ORAL at 09:10

## 2023-10-11 NOTE — PLAN OF CARE
SON called [facility] admissions [phone] and spoke with Malgorzata. They do have a bed for this Pt. SON sent updates and Facility Transfer Orders, Covid not needed. Malgorzata will review, then call SON back with a number for report to then set up transport. SON will notify Pt's family.    STERLING Matson, [place]  [facility]  [id number]

## 2023-10-11 NOTE — PLAN OF CARE
Problem: Bowel Elimination Impaired (Stroke, Hemorrhagic)  Goal: Effective Bowel Elimination  Outcome: Ongoing, Progressing     Problem: Cerebral Tissue Perfusion (Stroke, Hemorrhagic)  Goal: Optimal Cerebral Tissue Perfusion  Outcome: Ongoing, Progressing     Problem: Functional Ability Impaired (Stroke, Hemorrhagic)  Goal: Optimal Functional Ability  Outcome: Ongoing, Progressing     Problem: Respiratory Compromise (Stroke, Hemorrhagic)  Goal: Effective Oxygenation and Ventilation  Outcome: Ongoing, Progressing

## 2023-10-11 NOTE — PLAN OF CARE
Problem: Adult Inpatient Plan of Care  Goal: Plan of Care Review  Outcome: Ongoing, Progressing  Goal: Patient-Specific Goal (Individualized)  Outcome: Ongoing, Progressing  Goal: Optimal Comfort and Wellbeing  Outcome: Ongoing, Progressing  Goal: Readiness for Transition of Care  Outcome: Ongoing, Progressing     Problem: Skin Injury Risk Increased  Goal: Skin Health and Integrity  Outcome: Ongoing, Progressing     Problem: Impaired Wound Healing  Goal: Optimal Wound Healing  Outcome: Ongoing, Progressing     Problem: Infection  Goal: Absence of Infection Signs and Symptoms  Outcome: Ongoing, Progressing     Problem: Pain Acute  Goal: Acceptable Pain Control and Functional Ability  Outcome: Ongoing, Progressing     Problem: Neutropenia  Goal: Absence of Infection  Outcome: Ongoing, Progressing

## 2023-10-11 NOTE — PLAN OF CARE
[facility] ([facility])  Discharge Final Note    Primary Care Provider: Cal Alvarado FNP-C    Expected Discharge Date: 10/11/2023    Final Discharge Note (most recent)       Final Note - 10/11/23 1244          Final Note    Assessment Type Final Discharge Note (P)      Anticipated Discharge Disposition Rehab Facility (P)      What phone number can be called within the next 1-3 days to see how you are doing after discharge? [phone] (P)      Hospital Resources/Appts/Education Provided Appointments scheduled and added to AVS;Provided patient/caregiver with written discharge plan information (P)         Post-Acute Status    Post-Acute Authorization Placement (P)      Post-Acute Placement Status Set-up Complete/Auth obtained (P)      Coverage Medicare (P)      Patient choice form signed by patient/caregiver List with quality metrics by geographic area provided (P)      Discharge Delays Ambulance Transport/Facility Transport (P)                      Important Message from Medicare  Important Message from Medicare regarding Discharge Appeal Rights: Given to patient/caregiver, Explained to patient/caregiver, Signed/date by patient/caregiver     Date IMM was signed: 10/10/23  Time IMM was signed: 1023      [facility] [phone]. Pt's transport via stretcher is scheduled for 1:15 PM, Pt, emergency contact and RN notified.     RN to call report to [phone], ask for Pt's nurse.    Pt has no other post acute needs and is cleared for discharge from a case management standpoint.     STERLING Matson, LMSW [facility]  [id number]

## 2023-10-11 NOTE — PLAN OF CARE
[facility]    FACILITY TRANSFER ORDERS      Patient Name: Rhina Forrest  YOB: 1955    PCP: Cal Alvarado FNP-C   PCP Address: [address] [place] [address] [facility]  PCP Phone Number: [phone]  PCP Fax: [phone]    Encounter Date: 10/11/2023    Admit to: Inpatient Rehab     Vital Signs:  Routine    Diagnoses:   Active Hospital Problems    Diagnosis  POA    *Acute paraplegia [G82.20]  Yes    Hypokalemia [E87.6]  No    Stress and adjustment reaction [F43.29]  Yes    Unvaccinated for covid-19 [Z28.310]  Not Applicable     Due to numerous allergies.  Pt would receive vaccination if allergies not present        Cough [R05.9]  Yes    Urinary retention [R33.9]  Yes    Debility [R53.81]  Yes    Colitis [K52.9]  Yes      Resolved Hospital Problems    Diagnosis Date Resolved POA    NSTEMI (non-ST elevated myocardial infarction) [I21.4] 09/27/2023 Yes    Depression [F32.A] 10/05/2023 Yes    GERD (gastroesophageal reflux disease) [K21.9] 10/05/2023 Yes       Allergies:  Review of patient's allergies indicates:   Allergen Reactions    Acetaminophen Shortness Of Breath    Azithromycin Shortness Of Breath    Amlodipine      hypertension    Androgenic anabolic steroid      hypertension    Bisoprolol Other (See Comments)     hypertension    Buspirone Other (See Comments)     hypertension    Cortisone Dermatitis     Burning skin feeling     Erythromycin base Hives    Ibuprofen Other (See Comments)     Hypertension      Immune globulin,gamma caprylate(igg) Hives     All Gamma globulins    Latex, natural rubber Itching    Levaquin [levofloxacin] Other (See Comments)     Fever      Losartan Other (See Comments)     hypertension    Penicillins Hives    Wellbutrin [bupropion hcl] Other (See Comments)     depression    Adhesive Rash    Hydrochlorothiazide Rash    Macrobid [nitrofurantoin monohyd/m-cryst] Rash       Diet: regular diet    Activities: Activity as  tolerated    Goals of Care Treatment Preferences:  Code Status: Full Code      Nursing: per protocol     Labs: per protocol    CONSULTS:    Physical Therapy to evaluate and treat. , Occupational Therapy to evaluate and treat., and  to evaluate for community resources/long-range planning.    MISCELLANEOUS CARE:  Hernandez Care: Empty Hernandez bag every shift. Change Hernandez every month. and Routine Skin for Bedridden Patients: Apply moisture barrier cream to all skin folds and wet areas in perineal area daily and after baths and all bowel movements.    WOUND CARE ORDERS  None    Medications: Review discharge medications with patient and family and provide education.      Current Discharge Medication List        START taking these medications    Details   atorvastatin (LIPITOR) 20 MG tablet Take 1 tablet (20 mg total) by mouth once daily.  Qty: 90 tablet, Refills: 3      fluticasone propionate (FLONASE) 50 mcg/actuation nasal spray 2 sprays (100 mcg total) by Each Nostril route once daily.  Refills: 0      gabapentin (NEURONTIN) 400 MG capsule Take 2 capsules (800 mg total) by mouth 3 (three) times daily.  Qty: 180 capsule, Refills: 11      guaiFENesin (MUCINEX) 600 mg 12 hr tablet Take 2 tablets (1,200 mg total) by mouth 2 (two) times daily.      hydrALAZINE (APRESOLINE) 25 MG tablet Take 1 tablet (25 mg total) by mouth every 8 (eight) hours as needed (SBP > 180, DBP >100).    Comments: .      oxyCODONE (ROXICODONE) 5 MG immediate release tablet Take 1 tablet (5 mg total) by mouth every 4 (four) hours as needed (severe pain).  Qty: 10 tablet, Refills: 0    Comments: Quantity prescribed more than 7 day supply? No      pantoprazole (PROTONIX) 40 MG tablet Take 1 tablet (40 mg total) by mouth once daily.  Qty: 30 tablet, Refills: 11      polyethylene glycol (GLYCOLAX) 17 gram PwPk Take 17 g by mouth daily as needed (constipation).  Refills: 0                Immunizations Administered as of 10/11/2023       No  immunizations on file.                  _________________________________  Sachi Duffy MD  10/11/2023

## 2023-10-11 NOTE — SUBJECTIVE & OBJECTIVE
Interval History: NAEON. Await placement.    Review of Systems   All other systems reviewed and are negative.    Objective:     Vital Signs (Most Recent):  Temp: 99 °F (37.2 °C) (10/11/23 0808)  Pulse: 69 (10/11/23 0808)  Resp: (!) 22 (10/11/23 0808)  BP: (!) 143/70 (10/11/23 0808)  SpO2: (!) 94 % (10/11/23 0808) Vital Signs (24h Range):  Temp:  [97.8 °F (36.6 °C)-99 °F (37.2 °C)] 99 °F (37.2 °C)  Pulse:  [69-76] 69  Resp:  [16-22] 22  SpO2:  [92 %-96 %] 94 %  BP: (134-188)/(70-84) 143/70     Weight: 91.6 kg (201 lb 15.1 oz)  Body mass index is 34.66 kg/m².    Intake/Output Summary (Last 24 hours) at 10/11/2023 0921  Last data filed at 10/11/2023 0805  Gross per 24 hour   Intake 421.32 ml   Output 1950 ml   Net -1528.68 ml         Physical Exam  Eyes:      Pupils: Pupils are equal, round, and reactive to light.   Neurological:      Mental Status: She is oriented to person, place, and time.      Coordination: Finger-Nose-Finger Test normal.      Deep Tendon Reflexes:      Reflex Scores:       Bicep reflexes are 2+ on the right side and 2+ on the left side.       Brachioradialis reflexes are 2+ on the right side and 2+ on the left side.       Patellar reflexes are 0 on the right side and 0 on the left side.       Achilles reflexes are 0 on the right side and 0 on the left side.  Psychiatric:         Mood and Affect: Mood is anxious.         Speech: Speech is rapid and pressured.         Behavior: Behavior normal. Behavior is cooperative.      Comments: Speech no longer rapid/pressured             Significant Labs: All pertinent labs within the past 24 hours have been reviewed.    Significant Imaging: I have reviewed all pertinent imaging results/findings within the past 24 hours.

## 2023-10-11 NOTE — CONSULTS
[facility]  Psychology    Treatment Attempt  Patient Name: Rhina Forrest   MRN: [id number]      Pt was engaged with pt care services upon arrival. Will follow up with pt at a later time.       Rekha Moore M.S.  Psychology Doctoral Intern  [facility]

## 2023-10-16 LAB
AMPA-R AB CBA, SERUM: NEGATIVE
AMPHIPHYSIN AB TITR SER: NEGATIVE {TITER}
ANNOTATION COMMENT IMP: NORMAL
CASPR2-IGG CBA: NEGATIVE
CV2 IGG TITR SER: NEGATIVE {TITER}
DPPX IGG SERPL QL IF: NEGATIVE
GABA-B-R AB CBA, SERUM: NEGATIVE
GAD65 AB SER-SCNC: 0 NMOL/L
GFAP IFA, SERUM: NEGATIVE
GLIAL NUC TYPE 1 AB TITR SER: NEGATIVE {TITER}
HU1 AB TITR SER: NEGATIVE {TITER}
HU2 AB TITR SER IF: NEGATIVE {TITER}
HU3 AB TITR SER: NEGATIVE {TITER}
IGLON5 IFA, S: NEGATIVE
IMMUNOLOGIST REVIEW: NORMAL
LGI1-IGG CBA: NEGATIVE
M SEPTIN-7 IFA, S: NEGATIVE
MGLUR1 AB IFA, SERUM: NEGATIVE
NEUROCHONDRIN, IFA, S: NEGATIVE
NIF IFA, S: NEGATIVE
NMDA-R AB CBA, SERUM: NEGATIVE
PCA-1 AB TITR SER: NEGATIVE {TITER}
PCA-2 AB TITR SER: NEGATIVE {TITER}
PCA-TR AB TITR SER: NEGATIVE {TITER}

## 2023-10-17 LAB
AMPA-R AB CBA, CSF: NEGATIVE
AMPA-R AB CBA, SERUM: NEGATIVE
AMPHIPHYSIN AB TITR CSF: NEGATIVE {TITER}
AMPHIPHYSIN AB TITR SER: NEGATIVE {TITER}
ANNOTATION COMMENT IMP: NORMAL
ANNOTATION COMMENT IMP: NORMAL
CASPR2-IGG CBA, CSF: NEGATIVE
CASPR2-IGG CBA: NEGATIVE
CV2 IGG TITR CSF: NEGATIVE {TITER}
CV2 IGG TITR SER: NEGATIVE {TITER}
DPPX IGG CSF QL IF: NEGATIVE
DPPX IGG SERPL QL IF: NEGATIVE
GABA-B-R AB CBA, SERUM: NEGATIVE
GABA-B-R AB, CBA, CSF: NEGATIVE
GAD65 AB CSF-SCNC: 0 NMOL/L
GAD65 AB SER-SCNC: 0 NMOL/L
GFAP IFA, CSF: NEGATIVE
GFAP IFA, SERUM: NEGATIVE
GLIAL NUC TYPE 1 AB TITR CSF: NEGATIVE {TITER}
GLIAL NUC TYPE 1 AB TITR SER: NEGATIVE {TITER}
HU1 AB TITR CSF IF: NEGATIVE {TITER}
HU1 AB TITR SER: NEGATIVE {TITER}
HU2 AB TITR CSF IF: NEGATIVE {TITER}
HU2 AB TITR SER IF: NEGATIVE {TITER}
HU3 AB TITR CSF: NEGATIVE {TITER}
HU3 AB TITR SER: NEGATIVE {TITER}
IGLON5 IFA, CSF: NEGATIVE
IGLON5 IFA, S: NEGATIVE
IMMUNOLOGIST REVIEW: NORMAL
IMMUNOLOGIST REVIEW: NORMAL
LGI1-IGG CBA,CSF: NEGATIVE
LGI1-IGG CBA: NEGATIVE
M NEUROCHONDRIN IFA, CSF: NEGATIVE
M SEPTIN-7 IFA, CSF: NEGATIVE
M SEPTIN-7 IFA, S: NEGATIVE
MGLUR1 AB IFA, CSF: NEGATIVE
MGLUR1 AB IFA, SERUM: NEGATIVE
NEUROCHONDRIN, IFA, S: NEGATIVE
NIF IFA, CSF: NEGATIVE
NIF IFA, S: NEGATIVE
NMDA-R AB CBA, SERUM: NEGATIVE
NMDAR1 AB, CBA, CSF: NEGATIVE
PCA-1 AB TITR SER: NEGATIVE {TITER}
PCA-2 AB TITR CSF: NEGATIVE {TITER}
PCA-2 AB TITR SER: NEGATIVE {TITER}
PCA-TR AB TITR CSF: NEGATIVE {TITER}
PCA-TR AB TITR SER: NEGATIVE {TITER}
PURKINJE CELLS AB TITR CSF IF: NEGATIVE {TITER}

## 2023-10-19 LAB — GQ1B GANGL IGG TITR SER: NORMAL TITER

## 2023-10-20 ENCOUNTER — TELEPHONE (OUTPATIENT)
Dept: NEUROLOGY | Facility: CLINIC | Age: 68
End: 2023-10-20
Payer: MEDICARE

## 2023-10-20 NOTE — TELEPHONE ENCOUNTER
Discussed pt with Dr Ware. Dr Ware stated to schedule new pt with her or Dr Mcgraw when  pt is out of rehab. Msg to Chrissy to sched pt

## 2023-10-23 ENCOUNTER — TELEPHONE (OUTPATIENT)
Dept: NEUROLOGY | Facility: CLINIC | Age: 68
End: 2023-10-23
Payer: MEDICARE

## 2023-10-23 NOTE — DISCHARGE SUMMARY
[facility] ([facility])  [facility] Medicine  Discharge Summary      Patient Name: Rhina Forrest  MRN: [id number]  JUANA: [id number]  Patient Class: IP- Inpatient  Admission Date: 9/25/2023  Hospital Length of Stay: 16 days  Discharge Date and Time: 10/11/2023  4:25 AM  Attending Physician: No att. providers found   Discharging Provider: Sachi Duffy MD  Primary Care Provider: Cal Alvarado FNP-C  [facility] Medicine Team: [facility] HOSP MED N Sachi Duffy MD  Primary Care Team: [facility] HOSP MED N    HPI:   Copied from Neuro-crit care team:  66 yo F PMHx GERD, HTN, smoker, depression presenting with bilateral lower extremity plegia that began after heavy lifting on 9/18/23. Pt walks with a walker at baseline and lives at home alone. On 9/18 she was walking home with groceries on her walker and lifted the walker over a curb and hurt her back. Pt was able to walk home and put her groceries away. Once she sat on the couch, she was unable to get back up as her legs stopped moving. She also had decreased sensation from T12 down. Pt called 911 and was taken via EMS to [facility]. MRI cervical and thoracic spine revealed stenosis and T9-L1 cord edema with mild enhancement suspicious for evolving cord infarct vs demyelinating process per radiology report (no imaging was sent with patient). Pt was found to have NSTEMI with troponin 1.1 and was started on ASA and plavix. Pt also c/o bl upper quadrant abdominal pain and CT abdomen pelvis revealed colitis. She was started on cipro and flagyl. Pt has had a barber since 9/18 and has not had any bowel movements since 9/18 despite aggressive bowel regimen at OSH. Transferred to [facility] for neurosurgical evaluation and further workup. Pt currently c/o severe abdominal pain, n/v, severe back pain, BUE rash x 1 month, BLE plegia and T12 sensory level.          * No surgery found *      Hospital Course:   ICU course - remarkable for bowel decompression with  laxatives - rectal tube inserted, improved abdominal pain, discontinuation of all antibiotics, pain control issues (better on current rx). Stepped down to HM 10/1/23. On 10/2/23, IR performed spinal angiogram - came back normal. NSGy recommended Neurology consult for LP and signed off. Hernandez catheter inserted the same day for urinary retention.  Repeat MRI is negative for any new or acute findings.  Neurology performed a bedside LP on 10/05/2023 with no significantly elevated WBCs. High dose steroids started on 10/6/23. No neurologic improvement after 3 days, so extended to 5 day course which was completed on 10/10.  Neuro consult recommends PT/OT and Rehab with close f/u in clinic for further lab results and potential other therapies such as IVIG or PLEX.  Sinus congestion with cough 2/2 postnasal drip- mucinex started.  Hypertensive urgency x 1 on 10/10: SBP > 180, no Sx other than fatigue - given hydralazine 25 PO x 1.  Long discussion about 5-10 years of significant life stressors, grief, and PTSD. 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  He  of suicide, and daughter-in-law remarried and moved all grandchildren to [place] with new , so now grandchildren and patient are estranged.  Patient says daughter in law told grandchildren false negative information about her. Patient's mother , and patient has a lot of guilt about not seeing her prior to her death.  Previously on ativan PRN (took sparingly) and SSRIs (unknown details) and she self discontinued meds as she does not like being on meds.   She does not follow with any mental health professionals.   Psychology Dr Flako Ackerman consulted however pt's d/c facility secured quickly and pt d/c-ed prior to consult. D/c-ed to an outside Inpatient Rehab with close outpt f/u with neuro.                      Goals of Care Treatment Preferences:  Code Status: Full Code      Consults:   Consults (From admission, onward)        Status Ordering Provider     Inpatient consult to Psychology  Once        Provider:  Flako Ackerman, PhD    Completed BHARTI MCNALLY     Inpatient consult to Neurology  Once        Provider:  (Not yet assigned)    Completed AARTI BOO     Inpatient consult to Neurosurgery  Once        Provider:  (Not yet assigned)    Completed MIGUEL CAMACHO     Inpatient consult to Physical Medicine Rehab  Once        Provider:  (Not yet assigned)    Completed MIGUEL CAMACHO     Inpatient consult to Registered Dietitian/Nutritionist  Once        Provider:  (Not yet assigned)    Completed MIGUEL CAMACHO     IP consult to case management/social work  Once        Provider:  (Not yet assigned)    Completed JUANA QIU          Neuro  * Acute paraplegia   sudden onset bilateral lower extremity paresis, urinary retention and paresthesia that began after heavy lifting on 9/18/23.   OSH MRI cervical and thoracic spine revealed stenosis and T9-L1 central cord edema with mild enhancement. MRI Brain w/wo 9/25: patchy periventricular white matter T2/flair hyperintensities demylination vs microvascular disease  MRI C/T/L spine 9/25: Abnormal cord edema from T9 to conus  MRI spine demyelinating/MRA: possible diffusion restriction in distal thoracic spine/conus, non diagnostic MRA  --Cord findings suspicious for mass vs ischemia vs demyelinating disorder  -serum NMO negative  -Spinal angiogram on 10/2 neg for ischemia  -repeat MRI on 10/5 unremarkable for any new changes; s/p bedside LP by neurology largely unrevealing  - no improvement after 3 doses of steroid so far, continued for 2 more doses per Neurology through today 10/10  - no further Tx (ie IVIG or PLEX) planned per neuro at this time  - PT/OT, close f/u in neuro clinic      Psychiatric  Stress and adjustment reaction  See HPI  F/U psychology  consult  No support system at current time  Needs close f/u with psychology as outpt (could be done via telemedicine)      Pulmonary  Cough   Benign; neg xr chest  Trial of mucinex for congestion w post-nasal drip      Renal/  Hypokalemia        Urinary retention  Related to suspected spinal cord injury.   CIC while in ICU, had high bladder residual 10/2 >800 on nursing assessment therefore barber catheter insertion ordered  -maintain barber catheter until more mobile and neurologic work up is completed        ID  Unvaccinated for covid-19  - enhanced respiratory precautions (masking of all visitors) per patient request      GI  Colitis  Diagnosed at OSH based on upper quadrant abdominal pain and CT abdomen pelvis suggesting colitis. She was started on cipro and flagyl. Has not had any bowel movements since 9/18 despite aggressive bowel regimen at OSH.   CT a/p 9/26 - Large stool ball within the rectum with a suggestion of some rectal wall thickening.  Mild presacral soft tissue thickening/edema.  Findings raise the question of possible proctitis.  Improved symptoms w bowel movement  Off abx by the time she stepped down to , rectal tube in situ -> removed 10/3 once stool output was minimal  Bowel regimen stopped due to diarrhea  Resolved      Other  Debility  PT/OT evaluation - inpatient rehab  Likely to [facility] as soon as tomorrow        Final Active Diagnoses:    Diagnosis Date Noted POA    PRINCIPAL PROBLEM:  Acute paraplegia [G82.20] 09/25/2023 Yes    Hypokalemia [E87.6] 10/11/2023 No    Stress and adjustment reaction [F43.29] 10/10/2023 Yes    Unvaccinated for covid-19 [Z28.310] 10/10/2023 Not Applicable    Cough [R05.9] 10/08/2023 Yes    Urinary retention [R33.9] 10/02/2023 Yes    Debility [R53.81] 09/27/2023 Yes    Colitis [K52.9] 09/25/2023 Yes      Problems Resolved During this Admission:    Diagnosis Date Noted Date Resolved POA    NSTEMI (non-ST elevated myocardial infarction)  [I21.4] 09/25/2023 09/27/2023 Yes    Depression [F32.A] 09/25/2023 10/05/2023 Yes    GERD (gastroesophageal reflux disease) [K21.9] 09/25/2023 10/05/2023 Yes       Discharged Condition: stable    Disposition: Rehab Facility    Follow Up:    Patient Instructions:   No discharge procedures on file.    Significant Diagnostic Studies: see above    Pending Diagnostic Studies:     Procedure Component Value Units Date/Time    Freeze and Hold, [facility] [[id number]] Collected: 10/05/23 1545    Order Status: Sent Lab Status: No result     Specimen: CSF (Spinal Fluid) from Cerebrospinal Fluid          Medications:  Reconciled Home Medications:      Medication List      START taking these medications    atorvastatin 20 MG tablet  Commonly known as: LIPITOR  Take 1 tablet (20 mg total) by mouth once daily.     fluticasone propionate 50 mcg/actuation nasal spray  Commonly known as: FLONASE  2 sprays (100 mcg total) by Each Nostril route once daily.     gabapentin 400 MG capsule  Commonly known as: NEURONTIN  Take 2 capsules (800 mg total) by mouth 3 (three) times daily.     guaiFENesin 600 mg 12 hr tablet  Commonly known as: MUCINEX  Take 2 tablets (1,200 mg total) by mouth 2 (two) times daily.     hydrALAZINE 25 MG tablet  Commonly known as: APRESOLINE  Take 1 tablet (25 mg total) by mouth every 8 (eight) hours as needed (SBP > 180, DBP >100).     oxyCODONE 5 MG immediate release tablet  Commonly known as: ROXICODONE  Take 1 tablet (5 mg total) by mouth every 4 (four) hours as needed (severe pain).     pantoprazole 40 MG tablet  Commonly known as: PROTONIX  Take 1 tablet (40 mg total) by mouth once daily.     polyethylene glycol 17 gram Pwpk  Commonly known as: GLYCOLAX  Take 17 g by mouth daily as needed (constipation).            Indwelling Lines/Drains at time of discharge:   Lines/Drains/Airways     None                 Time spent on the discharge of patient: 35 minutes         Sachi Duffy MD  Department of Hospital  Medicine  [facility] ([facility])

## 2023-10-24 NOTE — PHYSICIAN QUERY
Not my patient  PT Name: Rhina Forrest  MR #: [id number]     DOCUMENTATION CLARIFICATION      CDS/: Aaron Burnett   RN          Contact information:  [email or web address]  This form is a permanent document in the medical record.     Query Date: October 24, 2023    By submitting this query, we are merely seeking further clarification of documentation.  Please utilize your independent clinical judgment when addressing the question(s) below.     The Medical Record contains the following:    Clinical Information Location in Medical Record   Acute paraplegia   sudden onset bilateral lower extremity paresis, urinary retention and paresthesia that began after heavy lifting on 9/18/23.   OSH MRI cervical and thoracic spine revealed stenosis and T9-L1 central cord edema with mild enhancement. MRI Brain w/wo 9/25: patchy periventricular white matter T2/flair hyperintensities demylination vs microvascular disease  MRI C/T/L spine 9/25: Abnormal cord edema from T9 to conus  MRI spine demyelinating/MRA: possible diffusion restriction in distal thoracic spine/conus, non diagnostic MRA  --Cord findings suspicious for mass vs ischemia vs demyelinating disorder  -serum NMO negative  -Spinal angiogram on 10/2 neg for ischemia  -repeat MRI on 10/5 unremarkable for any new changes; s/p bedside LP by neurology largely unrevealing  - no improvement after 3 doses of steroid so far, continued for 2 more doses per Neurology through today 10/10  - no further Tx (ie IVIG or PLEX) planned per neuro at this time    Hospital Course:   ICU course - remarkable for bowel decompression with laxatives - rectal tube inserted, improved abdominal pain, discontinuation of all antibiotics, pain control issues (better on current rx). Stepped down to  10/1/23. On 10/2/23, IR performed spinal angiogram - came back normal. NSGy recommended Neurology consult for LP and signed off. Hernandez catheter inserted the same day for urinary retention.   Repeat MRI is negative for any new or acute findings.  Neurology performed a bedside LP on 10/05/2023 with no significantly elevated WBCs. High dose steroids started on 10/6/23. No neurologic improvement after 3 days, so extended to 5 day course which was completed on 10/10.  Neuro consult recommends PT/OT and Rehab with close f/u in clinic for further lab results and potential other therapies such as IVIG or PLEX.  Sinus congestion with cough 2/2 postnasal drip- mucinex started.  Hypertensive urgency x 1 on 10/10: SBP > 180, no Sx other than fatigue - given hydralazine 25 PO x 1.  Long discussion about 5-10 years of significant life stressors, grief, and PTSD. 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  He  of suicide, and daughter-in-law remarried and moved all grandchildren to [place] with new , so now grandchildren and patient are estranged.  Patient says daughter in law told grandchildren false negative information about her. Patient's mother , and patient has a lot of guilt about not seeing her prior to her death.  Previously on ativan PRN (took sparingly) and SSRIs (unknown details) and she self discontinued meds as she does not like being on meds.   She does not follow with any mental health professionals.   Psychology Dr Flako Ackerman consulted however pt's d/c facility secured quickly and pt d/c-ed prior to consult. D/c-ed to an outside Inpatient Rehab with close outpt f/u with neuro.      Debility  PT/OT evaluation - inpatient rehab  Likely to [facility] as soon as tomorrow         D/C summary  (Ramee) 10/11     Please document your best medical opinion regarding the etiology of __ Acute paraplegia_____?       [   ] microvascular disease   [  x ] demyelinating disorder   [   ] Other etiology (please specify):___________________    [  ] Clinically Undetermined       Please document in your progress notes daily for the duration of treatment, until resolved, and include in your discharge summary.

## 2023-10-31 LAB — FUNGUS SPEC CULT: NORMAL

## 2023-11-01 ENCOUNTER — TELEPHONE (OUTPATIENT)
Dept: NEUROLOGY | Facility: CLINIC | Age: 68
End: 2023-11-01
Payer: MEDICARE

## 2023-11-01 NOTE — TELEPHONE ENCOUNTER
----- Message from Melody Mancuso sent at 11/1/2023  4:25 PM CDT -----  Regarding: Appointment  Contact: Madeline posey/[facility] [phone]  Calling to schedule an appointment per spinal cord injury as soon as possible. Please call Madeline at [facility] to schedule today.

## 2023-11-02 ENCOUNTER — TELEPHONE (OUTPATIENT)
Dept: NEUROLOGY | Facility: CLINIC | Age: 68
End: 2023-11-02

## 2023-11-02 NOTE — TELEPHONE ENCOUNTER
----- Message from Melody Mancuso sent at 11/1/2023  4:25 PM CDT -----  Regarding: Appointment  Contact: Madeline posey/[facility] [phone]  Calling to schedule an appointment per spinal cord injury as soon as possible. Please call Madeline at [facility] to schedule today.

## 2023-11-07 ENCOUNTER — TELEPHONE (OUTPATIENT)
Dept: NEUROLOGY | Facility: CLINIC | Age: 68
End: 2023-11-07
Payer: MEDICARE

## 2023-11-10 ENCOUNTER — TELEPHONE (OUTPATIENT)
Dept: NEUROLOGY | Facility: CLINIC | Age: 68
End: 2023-11-10
Payer: MEDICARE

## 2023-11-10 NOTE — TELEPHONE ENCOUNTER
----- Message from Dc Marvin sent at 11/10/2023  8:58 AM CST -----  Regarding: Pt advice  Contact: Madeline posey/[facility] [phone]  Madeline posey/[facility] Calling to schedule an appointment per spinal cord injury as soon as possible. Please call Madeline at [facility] to schedule today. Pt can be scheduled please call

## 2023-11-10 NOTE — TELEPHONE ENCOUNTER
Returned call, spoke with Aziza at Madeline posey/[facility].   Scheduled NP with Dr. Ware on 12/22/23 at 10AM. (ES)  Will fax appt reminder to [phone], attn? Aziza or Madeline.

## 2023-11-23 LAB
ACID FAST MOD KINY STN SPEC: NORMAL
MYCOBACTERIUM SPEC QL CULT: NORMAL

## 2023-12-04 NOTE — ASSESSMENT & PLAN NOTE
Afternoon,  I just want ed to see if you wanted me to schedule an appointment with Dr. Odonnell.  Please call me at [phone] or sent me a message.   Related to suspected spinal cord injury.   CIC while in ICU, had high bladder residual 10/2 >800 on nursing assessment therefore barber catheter insertion ordered  -maintain barber catheter until more mobile and neurologic work up is completed

## 2024-01-31 ENCOUNTER — TELEPHONE (OUTPATIENT)
Dept: NEUROLOGY | Facility: CLINIC | Age: 69
End: 2024-01-31
Payer: MEDICARE

## 2024-01-31 NOTE — TELEPHONE ENCOUNTER
Spoke with pt to confirm her appointment tomorrow ; pt had to cancel due to her not being in [place] right now . Advised pt I will leave her a voicemail to keep our clinic number in her contacts so when she is back in [place] to call and be rescheduled as soon as possible . Pt verbalized understanding     P.s. pt will also need transportation for her appointment.